# Patient Record
Sex: MALE
[De-identification: names, ages, dates, MRNs, and addresses within clinical notes are randomized per-mention and may not be internally consistent; named-entity substitution may affect disease eponyms.]

---

## 2017-03-19 ENCOUNTER — HOSPITAL ENCOUNTER (EMERGENCY)
Dept: HOSPITAL 14 - H.ER | Age: 62
Discharge: HOME | End: 2017-03-19
Payer: MEDICARE

## 2017-03-19 VITALS
DIASTOLIC BLOOD PRESSURE: 72 MMHG | TEMPERATURE: 98.2 F | OXYGEN SATURATION: 99 % | HEART RATE: 94 BPM | SYSTOLIC BLOOD PRESSURE: 116 MMHG | RESPIRATION RATE: 16 BRPM

## 2017-03-19 VITALS — BODY MASS INDEX: 19.5 KG/M2

## 2017-03-19 DIAGNOSIS — I10: ICD-10-CM

## 2017-03-19 DIAGNOSIS — K29.70: Primary | ICD-10-CM

## 2017-03-19 LAB
ALBUMIN/GLOB SERPL: 1.4 {RATIO} (ref 1–2.1)
ALP SERPL-CCNC: 85 U/L (ref 38–126)
ALT SERPL-CCNC: 32 U/L (ref 21–72)
AST SERPL-CCNC: 20 U/L (ref 17–59)
BASOPHILS # BLD AUTO: 0 K/UL (ref 0–0.2)
BASOPHILS NFR BLD: 0.5 % (ref 0–2)
BILIRUB SERPL-MCNC: 0.3 MG/DL (ref 0.2–1.3)
BUN SERPL-MCNC: 21 MG/DL (ref 9–20)
CALCIUM SERPL-MCNC: 9.3 MG/DL (ref 8.4–10.2)
CHLORIDE SERPL-SCNC: 102 MMOL/L (ref 98–107)
CO2 SERPL-SCNC: 24 MMOL/L (ref 22–30)
EOSINOPHIL # BLD AUTO: 0.1 K/UL (ref 0–0.7)
EOSINOPHIL NFR BLD: 0.7 % (ref 0–4)
ERYTHROCYTE [DISTWIDTH] IN BLOOD BY AUTOMATED COUNT: 14.6 % (ref 11.5–14.5)
ETHANOL SERPL-MCNC: < 10 MG/DL (ref 0–10)
GLOBULIN SER-MCNC: 3 GM/DL (ref 2.2–3.9)
GLUCOSE SERPL-MCNC: 101 MG/DL (ref 75–110)
HCT VFR BLD CALC: 38.7 % (ref 35–51)
LYMPHOCYTES # BLD AUTO: 2 K/UL (ref 1–4.3)
LYMPHOCYTES NFR BLD AUTO: 21.7 % (ref 20–40)
MCH RBC QN AUTO: 32 PG (ref 27–31)
MCHC RBC AUTO-ENTMCNC: 34.4 G/DL (ref 33–37)
MCV RBC AUTO: 93 FL (ref 80–94)
MONOCYTES # BLD: 0.7 K/UL (ref 0–0.8)
MONOCYTES NFR BLD: 7.5 % (ref 0–10)
NEUTROPHILS # BLD: 6.5 K/UL (ref 1.8–7)
NEUTROPHILS NFR BLD AUTO: 69.6 % (ref 50–75)
NRBC BLD AUTO-RTO: 0 % (ref 0–0)
PLATELET # BLD: 222 K/UL (ref 130–400)
PMV BLD AUTO: 8.1 FL (ref 7.2–11.7)
POTASSIUM SERPL-SCNC: 4.2 MMOL/L (ref 3.6–5)
PROT SERPL-MCNC: 7.4 G/DL (ref 6.3–8.2)
SODIUM SERPL-SCNC: 142 MMOL/L (ref 132–148)
WBC # BLD AUTO: 9.3 K/UL (ref 4.8–10.8)

## 2017-03-19 PROCEDURE — 83690 ASSAY OF LIPASE: CPT

## 2017-03-19 PROCEDURE — 99281 EMR DPT VST MAYX REQ PHY/QHP: CPT

## 2017-03-19 PROCEDURE — 96374 THER/PROPH/DIAG INJ IV PUSH: CPT

## 2017-03-19 PROCEDURE — 85025 COMPLETE CBC W/AUTO DIFF WBC: CPT

## 2017-03-19 PROCEDURE — 74176 CT ABD & PELVIS W/O CONTRAST: CPT

## 2017-03-19 PROCEDURE — 80053 COMPREHEN METABOLIC PANEL: CPT

## 2017-03-19 PROCEDURE — 96375 TX/PRO/DX INJ NEW DRUG ADDON: CPT

## 2017-03-19 NOTE — CT
EXAM:

  CT Abdomen and Pelvis Without Intravenous Contrast.



CLINICAL HISTORY:

  61 years old, male; Pain; Abdominal pain; Generalized; Patient HX: B/l abd 

pain with epigastric discomfort/anxiety. HTN. Pacreatitis; Additional info: R/O 

kidney stone



TECHNIQUE:

  Axial computed tomography images of the abdomen and pelvis without 

intravenous contrast.  This CT exam was performed using one or more of the 

following dose reduction techniques:  automated exposure control, adjustment of 

the mA and/or kV according to patient size, and/or use of iterative 

reconstruction technique.



COMPARISON:

  CT - ABD   PELVIS IV CONTRAST ONLY 1/14/2017 2:44:30 AM



FINDINGS:

  Lower thorax:  There is minimal bibasilar atelectasis.



 ABDOMEN:

  Liver:  There are no focal liver lesions present.

  Gallbladder and bile ducts:  The gallbladder is normal.  No calcified stones. 

 No ductal dilation.

  Pancreas:  The pancreas is normal.  No ductal dilation.

  Spleen:  The spleen is normal.

  Adrenals:  2.3 CM left adrenal adenoma is slightly enlarged from 2.0 CM on 

1/14/2017.

  Kidneys and ureters:  There is mild fullness of the left renal collecting 

system and left ureter without an obstructing calculus seen.  This could 

possibly relate to recently passed calculus, please correlate clinically.  The 

right kidney is normal.

  Stomach and bowel:  Stomach is partially decompressed and grossly 

unremarkable.  Colonic constipation is present.  There is no evidence of 

intestinal obstruction.  No mucosal thickening.

  Appendix:  No findings to suggest acute appendicitis.



 PELVIS:

  Bladder:  Bladder is partially decompressed however appears thickwalled.  The 

wall thickening could relate to underdistention, chronic outflow obstruction or 

infectious, inflammatory or neoplastic process.  Please correlate clinically 

and if indicated further evaluation can be obtained.

  Reproductive:  Prostate is prominent measuring 5.0 CM transverse.



 ABDOMEN and PELVIS:

  Intraperitoneal space:  There is no evidence of free intraperitoneal fluid.  

There is no free intraperitoneal air.

  Bones/joints:  There are mild degenerative changes present.  No acute 

fracture.  No dislocation.

  Soft tissues:  Unremarkable.

  Vasculature:  The aorta demonstrates moderate atherosclerotic calcification.  

No abdominal aortic aneurysm.

  Lymph nodes:  There is no evidence of lymphadenopathy.



IMPRESSION:     

1.  There is mild fullness of the left renal collecting system and left ureter 

without an obstructing calculus seen.  This could possibly relate to recently 

passed calculus, please correlate clinically.  

2.  Bladder is partially decompressed however appears thickwalled.  The wall 

thickening could relate to underdistention, chronic outflow obstruction or 

infectious, inflammatory or neoplastic process.  Please correlate clinically 

and if indicated further evaluation can be obtained.  

3.  Additional incidental and/or chronic findings as described.

## 2017-03-19 NOTE — ED PDOC
HPI: Abdomen


Time Seen by Provider: 17 20:49


Chief Complaint (Nursing): Chest Pain


Chief Complaint (Provider): GI problem


History Per: Patient


History/Exam Limitations: no limitations


Onset/Duration Of Symptoms: Days (2x)


Current Symptoms Are (Timing): Still Present


Severity: Moderate


Location Of Pain/Discomfort: Epigastric


Associated Symptoms: denies: Fever, Diarrhea


Additional Complaint(s): 


61 year old male patient with a pertinent medical history of pancreatitis and 

EtOH abuse presents to the ED with complaints of abdominal pain and vomiting 

that started 2x days ago. He denies ingesting alcohol for the past 8x months. 

He denies having a fever and diarrhea. 





PMD: Patient does not recall





Past Medical History


Reviewed: Historical Data, Nursing Documentation, Vital Signs


Vital Signs: 


 Last Vital Signs











Temp  98.2 F   17 20:29


 


Pulse  94 H  17 20:29


 


Resp  16   17 20:29


 


BP  116/72   17 20:29


 


Pulse Ox  99   17 21:45














- Medical History


PMH: Anxiety, Depression, HTN, Pancreatitis (Recurrent)


   Denies: HIV, Chronic Kidney Disease





- Family History


Family History: States: Unknown Family Hx





- Social History


Alcohol: None


Drugs: Denies





- Home Medications


Home Medications: 


 Ambulatory Orders











 Medication  Instructions  Recorded


 


Alprazolam [Xanax] 0.5 mg PO TID 16


 


Paroxetine HCl [Paxil] 40 mg PO DAILY 16


 


oxyCODONE [oxyCODONE Immediate 5 mg PO Q6 PRN 17





Release Tab]  


 


Dicyclomine [Dicyclomine HCl] 10 mg PO Q8 #20 cap 17


 


Famotidine [Pepcid] 20 mg PO Q12 #20 tab 17














- Allergies


Allergies/Adverse Reactions: 


 Allergies











Allergy/AdvReac Type Severity Reaction Status Date / Time


 


No Known Allergies Allergy   Verified 17 20:28














Review of Systems


ROS Statement: Except As Marked, All Systems Reviewed And Found Negative


Constitutional: Negative for: Fever


Gastrointestinal: Positive for: Vomiting, Abdominal Pain.  Negative for: 

Diarrhea





Physical Exam





- Reviewed


Nursing Documentation Reviewed: Yes


Vital Signs Reviewed: Yes





- Physical Exam


Appears: Positive for: Well, Non-toxic, No Acute Distress


Head Exam: Positive for: ATRAUMATIC, NORMOCEPHALIC


Skin: Positive for: Normal Color, Warm, Dry


Cardiovascular/Chest: Positive for: Regular Rate, Rhythm, Chest Non Tender


Respiratory: Positive for: Normal Breath Sounds.  Negative for: Respiratory 

Distress


Gastrointestinal/Abdominal: Positive for: Bowel Sounds, Tenderness (epigastric)


Neurologic/Psych: Positive for: Alert, Oriented (3x)





- Laboratory Results


Result Diagrams: 


 17 21:41





 17 21:41





- ECG


O2 Sat by Pulse Oximetry: 99 (RA)


Pulse Ox Interpretation: Normal





Medical Decision Making


Medical Decision Makin:49


Initial impression: 61 year old male with abdominal pain and vomiting.


Initial plan:


* alcohol serum


* CMP


* lipase


* CBC


* lactated ringers 1,000ml IV 500mls/hr


* morphine 4mg IVP


* pepcid 4mg IVP


* zofran 4mg IVP


* reevaluation


 


--------------------------------------------------------------------------------

----------------- 


Scribe Attestation:


Documented by Robyn Cancino, acting as a scribe for Mamadou Schaeffer MD.


 


Provider Scribe Attestation:


All medical record entries made by the Scribe were at my direction and 

personally dictated by me. I have reviewed the chart and agree that the record 

accurately reflects my personal performance of the history, physical exam, 

medical decision making, and the department course for this patient. I have 

also personally directed, reviewed, and agree with the discharge instructions 

and disposition.


 





Disposition





- Clinical Impression


Clinical Impression: 


 Gastritis





- Patient ED Disposition


Is Patient to be Admitted: No


Counseled Patient/Family Regarding: Studies Performed, Diagnosis, Need For 

Followup, Rx Given





- Disposition


Referrals: 


[facility] [Outside]


Disposition: Routine/Home


Disposition Time: 23:38


Condition: FAIR


Prescriptions: 


Dicyclomine [Dicyclomine HCl] 10 mg PO Q8 #20 cap


Famotidine [Pepcid] 20 mg PO Q12 #20 tab


Instructions:  Gastritis (ED)

## 2017-04-10 ENCOUNTER — HOSPITAL ENCOUNTER (INPATIENT)
Dept: HOSPITAL 14 - H.ER | Age: 62
LOS: 2 days | Discharge: HOME | DRG: 439 | End: 2017-04-12
Attending: INTERNAL MEDICINE | Admitting: INTERNAL MEDICINE
Payer: MEDICARE

## 2017-04-10 VITALS — BODY MASS INDEX: 19.5 KG/M2

## 2017-04-10 DIAGNOSIS — F11.20: ICD-10-CM

## 2017-04-10 DIAGNOSIS — Z91.19: ICD-10-CM

## 2017-04-10 DIAGNOSIS — F32.9: ICD-10-CM

## 2017-04-10 DIAGNOSIS — F10.10: ICD-10-CM

## 2017-04-10 DIAGNOSIS — K86.1: ICD-10-CM

## 2017-04-10 DIAGNOSIS — F17.200: ICD-10-CM

## 2017-04-10 DIAGNOSIS — K85.90: Primary | ICD-10-CM

## 2017-04-10 DIAGNOSIS — I10: ICD-10-CM

## 2017-04-10 DIAGNOSIS — F41.8: ICD-10-CM

## 2017-04-10 DIAGNOSIS — G89.29: ICD-10-CM

## 2017-04-10 LAB
ALBUMIN/GLOB SERPL: 1.4 {RATIO} (ref 1–2.1)
ALP SERPL-CCNC: 69 U/L (ref 38–126)
ALT SERPL-CCNC: 18 U/L (ref 21–72)
APTT BLD: 24 SECONDS (ref 23.3–32.5)
AST SERPL-CCNC: 26 U/L (ref 17–59)
BASOPHILS # BLD AUTO: 0 K/UL (ref 0–0.2)
BASOPHILS NFR BLD: 0.7 % (ref 0–2)
BILIRUB SERPL-MCNC: 0.3 MG/DL (ref 0.2–1.3)
BILIRUB UR-MCNC: NEGATIVE MG/DL
BUN SERPL-MCNC: 19 MG/DL (ref 9–20)
CALCIUM SERPL-MCNC: 10.1 MG/DL (ref 8.4–10.2)
CHLORIDE SERPL-SCNC: 104 MMOL/L (ref 98–107)
CO2 SERPL-SCNC: 26 MMOL/L (ref 22–30)
COLOR UR: (no result)
EOSINOPHIL # BLD AUTO: 0 K/UL (ref 0–0.7)
EOSINOPHIL NFR BLD: 0.3 % (ref 0–4)
ERYTHROCYTE [DISTWIDTH] IN BLOOD BY AUTOMATED COUNT: 14 % (ref 11.5–14.5)
ETHANOL SERPL-MCNC: < 10 MG/DL (ref 0–10)
GLOBULIN SER-MCNC: 3.2 GM/DL (ref 2.2–3.9)
GLUCOSE SERPL-MCNC: 88 MG/DL (ref 75–110)
GLUCOSE UR STRIP-MCNC: (no result) MG/DL
HCT VFR BLD CALC: 39.6 % (ref 35–51)
KETONES UR STRIP-MCNC: NEGATIVE MG/DL
LEUKOCYTE ESTERASE UR-ACNC: (no result) LEU/UL
LIPASE SERPL-CCNC: 429 U/L (ref 23–300)
LYMPHOCYTES # BLD AUTO: 1.7 K/UL (ref 1–4.3)
LYMPHOCYTES NFR BLD AUTO: 27.4 % (ref 20–40)
MCH RBC QN AUTO: 31.6 PG (ref 27–31)
MCHC RBC AUTO-ENTMCNC: 33.4 G/DL (ref 33–37)
MCV RBC AUTO: 94.6 FL (ref 80–94)
MONOCYTES # BLD: 0.6 K/UL (ref 0–0.8)
MONOCYTES NFR BLD: 8.8 % (ref 0–10)
NEUTROPHILS # BLD: 4 K/UL (ref 1.8–7)
NEUTROPHILS NFR BLD AUTO: 62.8 % (ref 50–75)
NRBC BLD AUTO-RTO: 0 % (ref 0–0)
PH UR STRIP: 6 [PH] (ref 5–8)
PLATELET # BLD: 225 K/UL (ref 130–400)
PMV BLD AUTO: 8 FL (ref 7.2–11.7)
POTASSIUM SERPL-SCNC: 4.7 MMOL/L (ref 3.6–5)
PROT SERPL-MCNC: 7.6 G/DL (ref 6.3–8.2)
PROT UR STRIP-MCNC: NEGATIVE MG/DL
RBC # UR STRIP: NEGATIVE /UL
RBC #/AREA URNS HPF: 1 /HPF (ref 0–3)
SODIUM SERPL-SCNC: 144 MMOL/L (ref 132–148)
SP GR UR STRIP: 1 (ref 1–1.03)
UROBILINOGEN UR-MCNC: (no result) MG/DL (ref 0.2–1)
WBC # BLD AUTO: 6.4 K/UL (ref 4.8–10.8)
WBC #/AREA URNS HPF: 2 /HPF (ref 0–5)

## 2017-04-10 NOTE — ED PDOC
- Laboratory Results


Result Diagrams: 


 04/10/17 19:10





 04/10/17 19:10





- ECG


O2 Sat by Pulse Oximetry: 98 (RA)





Medical Decision Making


Medical Decision Makin:00





Patient transferred over to me by Dr. Rodriguez Olson III pending ED workup. 





19:16


Patient is to be placed in ED Obs secondary to ED workup. 





0006: Patient still experiencing intractable pain. Spoke with Dr. Hahn who 

will admit the patient. Spoke with Dr. Cedillo who states patient was supposed 

to f/u outpatient, but never made an appointment. Dr. Cedillo agrees to consult 

and will consider ERCP or MRCP tomorrow. 





Disposition





- Clinical Impression


Clinical Impression: 


 Abdominal pain, Acute on chronic pancreatitis





- POA


Present On Arrival: None





- Disposition


Disposition: Hospitalized as Observation Patient


Disposition Time: 19:16


Condition: STABLE





ED OBSERVATION


Date of observation admission: 04/10/17


Time of observation admission: 19:16





- Progress Note


Progress Note: 


19:16





Patient placed in ED Obs secondary to ED workup.

## 2017-04-10 NOTE — CT
EXAM:

  CT Abdomen and Pelvis With Intravenous Contrast



CLINICAL HISTORY:

  61 years old, male; Pain; Abdominal pain; Epigastric; Patient HX: HX of 

chronic pancreatitis, gastritis; Additional info: Abdominal pain; HX 

pancreatitis abnormal pancreas



TECHNIQUE:

  Axial computed tomography images of the abdomen and pelvis with intravenous 

contrast.  This CT exam was performed using one or more of the following dose 

reduction techniques:  automated exposure control, adjustment of the mA and/or 

kV according to patient size, and/or use of iterative reconstruction technique.

  Coronal and sagittal reformatted images were created and reviewed.



CONTRAST:

  90 mL of [id number] administered intravenously.



COMPARISON:

  CT - ABD   PELVIS W/O PO OR 3/19/2017 10:51:39 PM



FINDINGS:

  Lower thorax:  There is minimal bibasilar atelectasis.



 ABDOMEN:

  Liver:  There are no focal liver lesions present.

  Gallbladder and bile ducts:  The gallbladder is normal.  No calcified stones. 

 No ductal dilation.

  Pancreas:  The pancreatic duct is prominent centrally measuring 5 mm.  It is 

difficult to exclude underlying abnormality.  If indicated, this could be 

further evaluated with MRCP or ERCP.

  Spleen:  The spleen is normal.

  Adrenals:  1.5 CM right adrenal adenoma.  2.0 CM left adrenal adenoma.

  Kidneys and ureters:  The kidneys are normal.  No hydronephrosis.

  Stomach and bowel:  The stomach is decompressed.  Colonic constipation is 

present.  There is no evidence of intestinal obstruction.  No mucosal 

thickening.

  Appendix:  No findings to suggest acute appendicitis.



 PELVIS:

  Bladder:  The bladder is normal.

  Reproductive:  4.8 CM prostate.



 ABDOMEN and PELVIS:

  Intraperitoneal space:  There is no evidence of free intraperitoneal fluid.  

There is no free intraperitoneal air.

  Bones/joints:  There are mild degenerative changes present.  No acute 

fracture.  No dislocation.

  Soft tissues:  Unremarkable.

  Vasculature:  The aorta demonstrates moderate atherosclerotic calcification.  

No abdominal aortic aneurysm.

  Lymph nodes:  There are multiple nonspecific enlarged lymph nodes.



IMPRESSION:     

1.  The pancreatic duct is prominent centrally measuring 5 mm.  It is difficult 

to exclude underlying abnormality.  If indicated, this could be further 

evaluated with MRCP or ERCP.  

2.  1.5 CM right adrenal adenoma.  2.0 CM left adrenal adenoma.  

3.  Additional incidental and/or chronic findings as described.

## 2017-04-10 NOTE — ED PDOC
HPI: Abdomen


Time Seen by Provider: 04/10/17 18:32


Chief Complaint (Nursing): Abdominal Pain


Chief Complaint (Provider): Abdominal Pain


History Per: Patient


History/Exam Limitations: no limitations


Onset/Duration Of Symptoms: Days (x2)


Associated Symptoms: Nausea, Vomiting (nonbloody)


Additional Complaint(s): 


18:39





Buddy Glynn is a 61 year old male with a history of chronic pancreatitis, 

drug and alcohol abuse, and chronic smoking, that presents to the ED with a 

chief complaint of stabbing central abdominal pain that radiates to his back 

that he has been experiencing for the past two days with associated nausea and 

several episodes on nonbloody vomiting. Patient states that this episode feels 

similar to the episodes of abdominal pain he has experienced as a result of 

pancreatitis, which he developed due to his drug and alcohol abuse, but also 

states that he has been sober from both drugs and alcohol for the past 7 

months. He denies any fever, syncope, chest pain, or shortness of breath. 





PMD: Kennedy Hahn





Past Medical History


Reviewed: Historical Data, Nursing Documentation, Vital Signs


Vital Signs: 


 Last Vital Signs











Temp  98.4 F   04/10/17 18:23


 


Pulse  103 H  04/10/17 18:23


 


Resp  16   04/10/17 18:23


 


BP  156/76 H  04/10/17 18:23


 


Pulse Ox  98   04/10/17 19:09














- Medical History


PMH: Anxiety, Depression, HTN, Pancreatitis (Recurrent)


   Denies: HIV, Chronic Kidney Disease





- Surgical History


Surgical History: No Surg Hx





- Family History


Family History: States: Unknown Family Hx





- Social History


Current smoker - smoking cessation education provided: Yes


Alcohol: None (patient states that he has been sober for 7 months)


Drugs: Denies, Other (patient states that he has been sober for 7 months)





- Home Medications


Home Medications: 


 Ambulatory Orders











 Medication  Instructions  Recorded


 


Alprazolam [Xanax] 0.5 mg PO TID 16


 


Paroxetine HCl [Paxil] 40 mg PO DAILY 16


 


oxyCODONE [oxyCODONE Immediate 5 mg PO Q6 PRN 17





Release Tab]  


 


Dicyclomine [Dicyclomine HCl] 10 mg PO Q8 #20 cap 17


 


Famotidine [Pepcid] 20 mg PO Q12 #20 tab 17














- Allergies


Allergies/Adverse Reactions: 


 Allergies











Allergy/AdvReac Type Severity Reaction Status Date / Time


 


No Known Allergies Allergy   Verified 17 20:28














Review of Systems


Constitutional: Negative for: Fever


Cardiovascular: Negative for: Chest Pain


Respiratory: Negative for: Shortness of Breath


Gastrointestinal: Positive for: Nausea, Vomiting (nonbloody)


Neurological: Negative for: Other (Syncope)





Physical Exam





- Reviewed


Nursing Documentation Reviewed: Yes


Vital Signs Reviewed: Yes





- Physical Exam


Appears: Positive for: Non-toxic


Head Exam: Positive for: ATRAUMATIC, NORMOCEPHALIC


Skin: Positive for: Normal Color, Warm.  Negative for: Pallor


Cardiovascular/Chest: Positive for: Regular Rate, Rhythm.  Negative for: Murmur


Respiratory: Positive for: Normal Breath Sounds.  Negative for: Wheezing


Pulses-Dorsalis Pedis (L): 2+


Pulses-Dorsalis Pedis (R): 2+


Gastrointestinal/Abdominal: Positive for: Tenderness (mild central abdominal 

tenderness).  Negative for: Guarding, Rebound


Neurologic/Psych: Positive for: Alert, Oriented





- ECG


O2 Sat by Pulse Oximetry: 98 (RA)


Pulse Ox Interpretation: Normal





Medical Decision Making


Medical Decision Makin:37





Initial Impression: Acute Abdominal Pain





Initial Plan:


* CBC


* CMP


* PTT


* PT


* Lipase


* Alcohol Serum


* Urinalysis


* Morphine 2 mg IV


* Sodium Chloride 1000 mL at 1000 mLs/hr


* Iohexol 50 ml PO


* Reevaluation





Patient's old charts were reviewed, CT Scan from 2017 revealed abnormal 

appearing tail of pancreas. Obtaining image to gauge stability of evolution. 





19:00


Patient will be transferred over to Dr. Ilia Contreras pending ED workup.


--------------------------------------------------------------------------------

----------------- 


Scribe Attestation:


Documented by Sherri Iglesias, acting as a scribe for Rodriguez Olson III, MD.





Provider Scribe Attestation:


All medical record entries made by the Scribe were at my direction and 

personally dictated by me. I have reviewed the chart and agree that the record 

accurately reflects my personal performance of the history, physical exam, 

medical decision making, and the department course for this patient. I have 

also personally directed, reviewed, and agree with the discharge instructions 

and disposition.





Disposition





- Clinical Impression


Clinical Impression: 


 Abdominal pain





- Patient ED Disposition


Is Patient to be Admitted: Transfer of Care





- Disposition


Disposition: Transfer of Care


Disposition Time: 19:10


Condition: STABLE


Patient Signed Over To: Ilia Contreras


Handoff Comments: pending labs imaging dispo

## 2017-04-11 LAB
ALBUMIN/GLOB SERPL: 1.4 {RATIO} (ref 1–2.1)
ALP SERPL-CCNC: 69 U/L (ref 38–126)
ALT SERPL-CCNC: 20 U/L (ref 21–72)
APTT BLD: 25.4 SECONDS (ref 23.3–32.5)
AST SERPL-CCNC: 26 U/L (ref 17–59)
BILIRUB SERPL-MCNC: 0.5 MG/DL (ref 0.2–1.3)
BILIRUB UR-MCNC: NEGATIVE MG/DL
BUN SERPL-MCNC: 17 MG/DL (ref 9–20)
CALCIUM SERPL-MCNC: 9.2 MG/DL (ref 8.4–10.2)
CHLORIDE SERPL-SCNC: 108 MMOL/L (ref 98–107)
CHOLEST SERPL-MCNC: 142 MG/DL (ref 0–199)
CO2 SERPL-SCNC: 24 MMOL/L (ref 22–30)
COLOR UR: YELLOW
ERYTHROCYTE [DISTWIDTH] IN BLOOD BY AUTOMATED COUNT: 13.8 % (ref 11.5–14.5)
GLOBULIN SER-MCNC: 2.6 GM/DL (ref 2.2–3.9)
GLUCOSE SERPL-MCNC: 78 MG/DL (ref 75–110)
GLUCOSE UR STRIP-MCNC: (no result) MG/DL
HCT VFR BLD CALC: 34.8 % (ref 35–51)
KETONES UR STRIP-MCNC: NEGATIVE MG/DL
LEUKOCYTE ESTERASE UR-ACNC: (no result) LEU/UL
MCH RBC QN AUTO: 31.8 PG (ref 27–31)
MCHC RBC AUTO-ENTMCNC: 33.8 G/DL (ref 33–37)
MCV RBC AUTO: 94 FL (ref 80–94)
PH UR STRIP: 5 [PH] (ref 5–8)
PLATELET # BLD: 181 K/UL (ref 130–400)
POTASSIUM SERPL-SCNC: 4.4 MMOL/L (ref 3.6–5)
PROT SERPL-MCNC: 6.2 G/DL (ref 6.3–8.2)
PROT UR STRIP-MCNC: NEGATIVE MG/DL
RBC # UR STRIP: NEGATIVE /UL
SODIUM SERPL-SCNC: 143 MMOL/L (ref 132–148)
SP GR UR STRIP: 1.02 (ref 1–1.03)
T4 SERPL-MCNC: 7.36 UG/DL (ref 5.5–11)
TSH SERPL-ACNC: 2.07 MIU/ML (ref 0.46–4.68)
UROBILINOGEN UR-MCNC: (no result) MG/DL (ref 0.2–1)
WBC # BLD AUTO: 6.2 K/UL (ref 4.8–10.8)
WBC #/AREA URNS HPF: < 1 /HPF (ref 0–5)

## 2017-04-11 NOTE — CP.PCM.HP
History of Present Illness





- History of Present Illness


History of Present Illness: 


CC:  Abdominal pain.





60 y/o M, came to ER [facility] for evaluation of Abdominal pain, ondet 2 days PTA 

with no relief.





Pt appear c/o of Abdominal pain RUQ,RLQ increased on DOA, pain is stabbing, 

constant, moderate to severe intensity 7-8:10, radiated to mid-back


associated to N/V/D (several episodes of vomiting non bloody-non bilious).  

Worsening symptoms:  Hx of Alcohol abuse (currently drinking).





Aggravated factor:  Changing positions, Irritability due to pain.





Pt stated, last time he felt these symptoms was having an episode of Acute 

Pancreatitis. 





Pt denied:  Fever, chills, dizziness, syncope, CP, SOB, cough, weakness, sick 

contact, recent travel. Pt denied Hx HIV.





PMHx:  Recurrent Pancreatitis, Anxiety, Depression, HTN.  Hx ETHO abuse, Opioid 

dependence for chronic pain.





CT Abd/Pel Shows; Prominent Pancreatic duct,  b/l adrenal adenoma.














Present on Admission





- Present on Admission


Any Indicators Present on Admission: No





Review of Systems





- Constitutional


Constitutional: Other (negative).  absent: Chills, Fever





- EENT


Eyes: Requires Corrective Lenses


Ears: Other (negative)


Nose/Mouth/Throat: Other (negative)





- Cardiovascular


Cardiovascular: Other (negative)





- Respiratory


Respiratory: Other (negative)





- Gastrointestinal


Gastrointestinal: Abdominal Pain, Diarrhea, Nausea, Vomiting





- Genitourinary


Genitourinary: Other (negative)





- Musculoskeletal


Musculoskeletal: Back Pain





- Integumentary


Integumentary: Other (negative)





- Neurological


Neurological: Other (negative)





- Psychiatric


Psychiatric: Anxiety, Depression





- Endocrine


Endocrine: Other (negative)





- Hematologic/Lymphatic


Hematologic: Other (negative)





Past Patient History





- Infectious Disease


Hx of Infectious Diseases: None





- Past Medical History & Family History


Past Medical History?: Yes


Pertinent Family History: 


Unknown





- Past Social History


Smoking Status: Light Smoker < 10 Cigarettes Daily


Alcohol: Other (ETHO abuse, last time drinking 7 month ago.)


Drugs: Denies


Home Situation {Lives}: Alone





- CARDIAC


Hx Cardiac Disorders: Yes


Hx Hypertension: Yes





- PULMONARY


Hx Respiratory Disorders: No





- NEUROLOGICAL


Hx Neurological Disorder: No





- HEENT


Hx HEENT Problems: No





- RENAL


Hx Chronic Kidney Disease: No





- ENDOCRINE/METABOLIC


Hx Endocrine Disorders: No





- HEMATOLOGICAL/ONCOLOGICAL


Hx Blood Disorders: No


Hx Human Immunodeficiency Virus (HIV): No





- INTEGUMENTARY


Hx Dermatological Problems: No





- MUSCULOSKELETAL/RHEUMATOLOGICAL


Hx Musculoskeletal Disorders: Yes


Hx Falls: Yes





- GASTROINTESTINAL


Hx Gastrointestinal Disorders: Yes


Hx Pancreatitis: Yes (Recurrent)





- GENITOURINARY/GYNECOLOGICAL


Hx Genitourinary Disorders: No





- PSYCHIATRIC


Hx Psychophysiologic Disorder: Yes


Hx Anxiety: Yes


Hx Depression: Yes


Hx Substance Use: Yes





- SURGICAL HISTORY


Hx Surgeries: No





- ANESTHESIA


Hx Anesthesia: No


Hx Anesthesia Reactions: No


Hx Malignant Hyperthermia: No





Meds


Allergies/Adverse Reactions: 


 Allergies











Allergy/AdvReac Type Severity Reaction Status Date / Time


 


No Known Allergies Allergy   Verified 03/19/17 20:28














Physical Exam





- Constitutional


Appears: No Acute Distress





- Head Exam


Head Exam: NORMAL INSPECTION





- Eye Exam


Eye Exam: PERRL





- ENT Exam


ENT Exam: Normal Oropharynx





- Neck Exam


Neck exam: Positive for: Normal Inspection





- Respiratory Exam


Respiratory Exam: NORMAL BREATHING PATTERN





- Cardiovascular Exam


Cardiovascular Exam: REGULAR RHYTHM





- GI/Abdominal Exam


GI & Abdominal Exam: Normal Bowel Sounds, Soft





- Extremities Exam


Extremities exam: Positive for: normal inspection





- Back Exam


Back exam: NORMAL INSPECTION





- Neurological Exam


Neurological exam: Alert, Oriented x3


Additional comments: 


No motor sensor deficit.





- Psychiatric Exam


Psychiatric exam: Anxious





- Skin


Skin Exam: Warm





Results





- Vital Signs


Recent Vital Signs: 





 Last Vital Signs











Temp  97.8 F   04/11/17 07:52


 


Pulse  71   04/11/17 07:52


 


Resp  20   04/11/17 07:52


 


BP  137/82   04/11/17 07:52


 


Pulse Ox  95   04/11/17 07:52








reviewed ASIF





- Labs


Result Diagrams: 


 04/11/17 06:13





 04/11/17 06:50


Labs: 





 Laboratory Results - last 24 hr











  04/10/17 04/11/17 04/11/17





  19:28 06:13 06:50


 


WBC   6.2 


 


RBC   3.70 L 


 


Hgb   11.7 L 


 


Hct   34.8 L 


 


MCV   94.0 


 


MCH   31.8 H 


 


MCHC   33.8 


 


RDW   13.8 


 


Plt Count   181 


 


PT    11.2


 


INR    1.08


 


APTT    25.4


 


Sodium    143


 


Potassium    4.4


 


Chloride    108 H


 


Carbon Dioxide    24


 


Anion Gap    15


 


BUN    17


 


Creatinine    0.7 L


 


Est GFR ( Amer)    > 60


 


Est GFR (Non-Af Amer)    > 60


 


Random Glucose    78


 


Calcium    9.2


 


Total Bilirubin    0.5


 


AST    26


 


ALT    20 L


 


Alkaline Phosphatase    69


 


Total Protein    6.2 L


 


Albumin    3.6


 


Globulin    2.6


 


Albumin/Globulin Ratio    1.4


 


Triglycerides    68


 


Cholesterol    142


 


LDL Cholesterol Direct    85


 


HDL Cholesterol    42


 


Thyroxine (T4)    7.36


 


TSH 3rd Generation    2.07


 


Urine Color  Straw  


 


Urine Clarity  Clear  


 


Urine pH  6.0  


 


Ur Specific Gravity  1.005  


 


Urine Protein  Negative  


 


Urine Glucose (UA)  Neg  


 


Urine Ketones  Negative  


 


Urine Blood  Negative  


 


Urine Nitrate  Negative  


 


Urine Bilirubin  Negative  


 


Urine Urobilinogen  0.2-1.0  


 


Ur Leukocyte Esterase  Neg  


 


Urine RBC (Auto)  1  


 


Urine Microscopic WBC  2  








reviewed J,P.





- Imaging and Cardiology


  ** CT scan - abdomen


Status: Report reviewed by me (ANNYP.)





  ** CT scan - pelvis


Status: Report reviewed by me (LYDIA.P.)





Assessment & Plan


(1) Abdominal pain


Status: Acute   Priority: High





(2) Acute on chronic pancreatitis


Status: Acute   Priority: High





(3) Anxiety


Status: Chronic   Priority: Medium





(4) Depression


Status: Chronic   Priority: Medium








- Assessment and Plan (Free Text)


Plan: 


Continue Dilaudid, Pepcid, Xanax and rest of Tx. f/u GI consult.





- Date & Time


Date: 04/11/17


Time: 11:00

## 2017-04-11 NOTE — CP.PCM.CON
History of Present Illness





- History of Present Illness


History of Present Illness: 


GI consult requested by Dr Hahn- This is a 61 year old male with history of 

Alcohol Abuse and dependance, Chronic pancreatitis with numerous admissions for 

same complaint and chronic abdominal pain presenting with abdominal pain, nausea

, vomiting, and diarrhea. As per patient last drink last week. He is still 

smoking. Patient admits his symptoms are similar to prior ER visit  CT A/P 

showing pancreatic ductal dilatation in pancreatic head and prior hypodense 

pancreatic body lesion not noted from CT A/P 2/2015. Patient states he possibly 

had a colonoscopy five years ago at outside facility-stating he "was not told 

the results". He is focused on asking for pain medications. He is somewhat 

agitated today. Tolerated diet




















Review of Systems





- Review of Systems


Review of Systems: 


12 point ROS unremarkable except that documented in HPI





Past Patient History





- Infectious Disease


Hx of Infectious Diseases: None





- Past Medical History & Family History


Past Medical History?: Yes





- Past Social History


Smoking Status: Light Smoker < 10 Cigarettes Daily





- CARDIAC


Hx Cardiac Disorders: Yes


Hx Hypertension: Yes





- PULMONARY


Hx Respiratory Disorders: No





- NEUROLOGICAL


Hx Neurological Disorder: No





- HEENT


Hx HEENT Problems: No





- RENAL


Hx Chronic Kidney Disease: No





- ENDOCRINE/METABOLIC


Hx Endocrine Disorders: No





- HEMATOLOGICAL/ONCOLOGICAL


Hx Blood Disorders: No


Hx Human Immunodeficiency Virus (HIV): No





- INTEGUMENTARY


Hx Dermatological Problems: No





- MUSCULOSKELETAL/RHEUMATOLOGICAL


Hx Musculoskeletal Disorders: No


Hx Falls: Yes





- GASTROINTESTINAL


Hx Gastrointestinal Disorders: Yes


Hx Pancreatitis: Yes (Recurrent)





- GENITOURINARY/GYNECOLOGICAL


Hx Genitourinary Disorders: No





- PSYCHIATRIC


Hx Psychophysiologic Disorder: Yes


Hx Anxiety: Yes


Hx Depression: Yes


Hx Substance Use: Yes





- SURGICAL HISTORY


Hx Surgeries: No





- ANESTHESIA


Hx Anesthesia: No


Hx Anesthesia Reactions: No


Hx Malignant Hyperthermia: No





Meds


Allergies/Adverse Reactions: 


 Allergies











Allergy/AdvReac Type Severity Reaction Status Date / Time


 


No Known Allergies Allergy   Verified 03/19/17 20:28














- Medications


Medications: 


 Current Medications





Alprazolam (Xanax)  0.5 mg PO TID [facility]


   Last Admin: 04/11/17 16:02 Dose:  0.5 mg


Dicyclomine HCl (Bentyl)  10 mg PO Q8 [facility]


   Last Admin: 04/11/17 18:36 Dose:  10 mg


Famotidine (Pepcid)  20 mg PO Q12 [facility]


   Last Admin: 04/11/17 09:29 Dose:  20 mg


Hydromorphone HCl (Dilaudid)  2 mg IVP Q4H PRN


   PRN Reason: Pain, severe (8-10)


   Last Admin: 04/11/17 19:51 Dose:  2 mg


Lactated Ringer's (Lactated Ringer's)  1,000 mls @ 250 mls/hr IV .Q4H [facility]


   Last Admin: 04/11/17 19:56 Dose:  250 mls/hr


Oxycodone HCl (Oxycodone Immediate Release Tab)  5 mg PO Q6 PRN


   PRN Reason: Pain, moderate (4-7)


Pantoprazole Sodium (Protonix Inj)  40 mg IVP DAILY [facility]


   Last Admin: 04/11/17 09:29 Dose:  40 mg


Paroxetine HCl (Paxil)  40 mg PO DAILY [facility]


   Last Admin: 04/11/17 12:29 Dose:  40 mg











Physical Exam





- Constitutional


Appears: Non-toxic, No Acute Distress





- Head Exam


Head Exam: ATRAUMATIC, NORMAL INSPECTION, NORMOCEPHALIC





- Eye Exam


Eye Exam: EOMI, Normal appearance, PERRL





- ENT Exam


ENT Exam: Mucous Membranes Moist, Normal Exam





- Respiratory Exam


Respiratory Exam: Clear to Auscultation Bilateral, NORMAL BREATHING PATTERN





- Cardiovascular Exam


Cardiovascular Exam: REGULAR RHYTHM, RRR, +S1, +S2





- GI/Abdominal Exam


GI & Abdominal Exam: Normal Bowel Sounds, Soft.  absent: Tenderness


Additional comments: 


Non tender. No guarding





- Extremities Exam


Extremities exam: Positive for: normal inspection





- Neurological Exam


Neurological exam: Alert, CN II-XII Intact, Normal Gait, Oriented x3, Reflexes 

Normal





- Skin


Skin Exam: Dry, Intact, Normal Color, Warm





Results





- Vital Signs


Recent Vital Signs: 


 Last Vital Signs











Temp  97.4 F L  04/11/17 20:19


 


Pulse  62   04/11/17 20:19


 


Resp  18   04/11/17 20:19


 


BP  115/74   04/11/17 20:19


 


Pulse Ox  97   04/11/17 20:19














- Labs


Result Diagrams: 


 04/11/17 06:13





 04/11/17 06:50


Labs: 


 Laboratory Results - last 24 hr











  04/11/17





  15:20


 


Urine Color  Yellow


 


Urine Clarity  Clear


 


Urine pH  5.0


 


Ur Specific Gravity  1.019


 


Urine Protein  Negative


 


Urine Glucose (UA)  Neg


 


Urine Ketones  Negative


 


Urine Blood  Negative


 


Urine Nitrate  Negative


 


Urine Bilirubin  Negative


 


Urine Urobilinogen  0.2-1.0


 


Ur Leukocyte Esterase  Neg


 


Urine Microscopic WBC  < 1














Assessment & Plan





- Assessment and Plan (Free Text)


Assessment: 


61 year old male with history of Alcohol Abuse, Chronic pancreatitis with 

numerous previous admissions admitted with, and chronic abdominal pain now 

resolving and tolerating diet. CT A/P showing pancreatic ductal dilatation in 

pancreatic head. Last admission he was given outpatient appointment with Dr Lux for outpatient EUS for PD dilatation but he has been non compliant with 

outpatient appointments. Has opioid dependance due to chronic pain. Actively 

drinking alcohol. 











Plan: 


- High volume IVF as tolerated by cardiac status


- Diet as tolerated


- pain management


- Needs outpatient EUS for PD dilatation and chronic pain symptoms


- Pancreatic enzymes with food and snacks


- GI and DVT prophylaxis


- Alcohol cessation


- Supportive care 





- Date & Time


Date: 04/11/17


Time: 20:45

## 2017-04-12 VITALS — HEART RATE: 62 BPM

## 2017-04-12 VITALS
DIASTOLIC BLOOD PRESSURE: 71 MMHG | SYSTOLIC BLOOD PRESSURE: 116 MMHG | TEMPERATURE: 98.4 F | RESPIRATION RATE: 20 BRPM | OXYGEN SATURATION: 95 %

## 2017-04-12 LAB
ALBUMIN/GLOB SERPL: 1.4 {RATIO} (ref 1–2.1)
ALP SERPL-CCNC: 66 U/L (ref 38–126)
ALT SERPL-CCNC: 23 U/L (ref 21–72)
AMYLASE SERPL-CCNC: 187 U/L (ref 30–110)
AST SERPL-CCNC: 28 U/L (ref 17–59)
BILIRUB SERPL-MCNC: 0.3 MG/DL (ref 0.2–1.3)
BUN SERPL-MCNC: 15 MG/DL (ref 9–20)
CALCIUM SERPL-MCNC: 9.4 MG/DL (ref 8.4–10.2)
CHLORIDE SERPL-SCNC: 107 MMOL/L (ref 98–107)
CO2 SERPL-SCNC: 26 MMOL/L (ref 22–30)
ERYTHROCYTE [DISTWIDTH] IN BLOOD BY AUTOMATED COUNT: 14.1 % (ref 11.5–14.5)
GLOBULIN SER-MCNC: 2.6 GM/DL (ref 2.2–3.9)
GLUCOSE SERPL-MCNC: 74 MG/DL (ref 75–110)
HCT VFR BLD CALC: 33.2 % (ref 35–51)
LIPASE SERPL-CCNC: 143 U/L (ref 23–300)
MCH RBC QN AUTO: 32.1 PG (ref 27–31)
MCHC RBC AUTO-ENTMCNC: 33.8 G/DL (ref 33–37)
MCV RBC AUTO: 94.8 FL (ref 80–94)
PLATELET # BLD: 168 K/UL (ref 130–400)
POTASSIUM SERPL-SCNC: 4.2 MMOL/L (ref 3.6–5)
PROT SERPL-MCNC: 6.2 G/DL (ref 6.3–8.2)
SODIUM SERPL-SCNC: 146 MMOL/L (ref 132–148)
WBC # BLD AUTO: 6.6 K/UL (ref 4.8–10.8)

## 2017-04-12 NOTE — CP.PCM.PN
Subjective





- Date & Time of Evaluation


Date of Evaluation: 04/12/17


Time of Evaluation: 09:15





- Subjective


Subjective: 


F/U  Abdominal pain.





Abdominal pain improved, eating well.





Objective





- Vital Signs/Intake and Output


Vital Signs (last 24 hours): 


 











Temp Pulse Resp BP Pulse Ox


 


 98.4 F   62   20   116/71   95 


 


 04/12/17 07:56  04/12/17 08:31  04/12/17 07:56  04/12/17 07:56  04/12/17 07:56











- Labs


Labs: 


 





 04/12/17 05:20 





 04/12/17 05:20 





 











PT  11.2 SECONDS (9.6-11.2)   04/11/17  06:50    


 


INR  1.08  (0.92-1.08)   04/11/17  06:50    


 


APTT  25.4 SECONDS (23.3-32.5)   04/11/17  06:50    














- Constitutional


Appears: No Acute Distress





- Head Exam


Head Exam: NORMAL INSPECTION





- Eye Exam


Eye Exam: PERRL





- ENT Exam


ENT Exam: Normal Oropharynx





- Neck Exam


Neck Exam: Normal Inspection





- Respiratory Exam


Respiratory Exam: NORMAL BREATHING PATTERN





- Cardiovascular Exam


Cardiovascular Exam: REGULAR RHYTHM





- GI/Abdominal Exam


GI & Abdominal Exam: Soft, Normal Bowel Sounds





- Extremities Exam


Extremities Exam: Normal Inspection





- Back Exam


Back Exam: NORMAL INSPECTION





- Neurological Exam


Neurological Exam: Alert, Oriented x3.  absent: Motor Sensory Deficit





- Psychiatric Exam


Psychiatric exam: Anxious





- Skin


Skin Exam: Warm





Assessment and Plan


(1) Abdominal pain


Assessment & Plan: 


Improved.


Status: Acute





(2) Acute on chronic pancreatitis


Status: Acute





(3) Anxiety


Status: Chronic





(4) Depression


Status: Chronic








- Assessment and Plan (Free Text)


Plan: 


Pt improved ans stable to be discharged, see instruction medication sheet, f/u 

PMD.

## 2017-04-12 NOTE — CP.PCM.PN
Subjective





- Date & Time of Evaluation


Date of Evaluation: 04/12/17


Time of Evaluation: 09:58





- Subjective


Subjective: 


RFV: Pancreatitis


S: Reports mild persistent abdominal pain, but is overall improved. Tolerated 

breakfast. Wants to go home.





Objective





- Vital Signs/Intake and Output


Vital Signs (last 24 hours): 


 











Temp Pulse Resp BP Pulse Ox


 


 98.4 F   62   20   116/71   95 


 


 04/12/17 07:56  04/12/17 08:31  04/12/17 07:56  04/12/17 07:56  04/12/17 07:56











- Medications


Medications: 


 Current Medications





Alprazolam (Xanax)  0.5 mg PO TID [facility]


   Last Admin: 04/12/17 08:10 Dose:  0.5 mg


Dicyclomine HCl (Bentyl)  10 mg PO Q8 [facility]


   Last Admin: 04/12/17 08:11 Dose:  10 mg


Famotidine (Pepcid)  20 mg PO Q12 [facility]


   Last Admin: 04/12/17 08:12 Dose:  20 mg


Hydromorphone HCl (Dilaudid)  2 mg IVP Q4H PRN


   PRN Reason: Pain, severe (8-10)


   Last Admin: 04/12/17 08:09 Dose:  2 mg


Lactated Ringer's (Lactated Ringer's)  1,000 mls @ 60 mls/hr IV .[id number] [facility]


   Last Admin: 04/11/17 23:59 Dose:  60 mls/hr


Oxycodone HCl (Oxycodone Immediate Release Tab)  5 mg PO Q6 PRN


   PRN Reason: Pain, moderate (4-7)


Pantoprazole Sodium (Protonix Inj)  40 mg IVP DAILY [facility]


   Last Admin: 04/12/17 08:12 Dose:  40 mg


Paroxetine HCl (Paxil)  40 mg PO DAILY [facility]


   Last Admin: 04/12/17 08:11 Dose:  40 mg











- Labs


Labs: 


 





 04/12/17 05:20 





 04/12/17 05:20 





 











PT  11.2 SECONDS (9.6-11.2)   04/11/17  06:50    


 


INR  1.08  (0.92-1.08)   04/11/17  06:50    


 


APTT  25.4 SECONDS (23.3-32.5)   04/11/17  06:50    














- Constitutional


Appears: No Acute Distress, Chronically Ill





- Head Exam


Head Exam: ATRAUMATIC, NORMOCEPHALIC





- Eye Exam


Eye Exam: Normal appearance.  absent: Scleral icterus





- ENT Exam


ENT Exam: Mucous Membranes Moist, Normal Oropharynx





- Respiratory Exam


Respiratory Exam: Clear to Ausculation Bilateral, NORMAL BREATHING PATTERN.  

absent: Respiratory Distress





- Cardiovascular Exam


Cardiovascular Exam: REGULAR RHYTHM, +S1, +S2





- GI/Abdominal Exam


GI & Abdominal Exam: Soft.  absent: Distended, Guarding, Tenderness





- Neurological Exam


Neurological Exam: Alert, Oriented x3





- Psychiatric Exam


Psychiatric exam: Normal Affect, Normal Mood





- Skin


Skin Exam: Dry, Normal Color, Warm





Assessment and Plan





- Assessment and Plan (Free Text)


Assessment: 


61 year old male with history of EtOH Abuse, Chronic pancreatitis admitted with 

recurrent abdominal pain.


1. Chronic pancreatitis


2. Pancreatic duct dilation


Plan: 


- low fat diet recommended


- pancreatic enzyme supplemenation with meals TID 


- protonix 40 mg po daily


- Diet as tolerated


- etoh/smoking abstinence


- Needs outpatient EUS for PD dilatation to exclude pancreatic mass


- ok ot discharge from GI standpoint

## 2017-04-14 ENCOUNTER — HOSPITAL ENCOUNTER (EMERGENCY)
Dept: HOSPITAL 14 - H.ER | Age: 62
Discharge: HOME | End: 2017-04-14
Payer: MEDICARE

## 2017-04-14 VITALS
OXYGEN SATURATION: 98 % | DIASTOLIC BLOOD PRESSURE: 80 MMHG | HEART RATE: 88 BPM | SYSTOLIC BLOOD PRESSURE: 140 MMHG | RESPIRATION RATE: 16 BRPM | TEMPERATURE: 98.9 F

## 2017-04-14 VITALS — BODY MASS INDEX: 19.5 KG/M2

## 2017-04-14 DIAGNOSIS — F41.9: ICD-10-CM

## 2017-04-14 DIAGNOSIS — K85.90: ICD-10-CM

## 2017-04-14 DIAGNOSIS — F10.20: ICD-10-CM

## 2017-04-14 DIAGNOSIS — G89.29: ICD-10-CM

## 2017-04-14 DIAGNOSIS — Z87.891: ICD-10-CM

## 2017-04-14 DIAGNOSIS — R11.10: ICD-10-CM

## 2017-04-14 DIAGNOSIS — R10.13: Primary | ICD-10-CM

## 2017-04-14 DIAGNOSIS — I10: ICD-10-CM

## 2017-04-14 LAB
ALBUMIN/GLOB SERPL: 1.5 {RATIO} (ref 1–2.1)
ALP SERPL-CCNC: 63 U/L (ref 38–126)
ALT SERPL-CCNC: 29 U/L (ref 21–72)
AST SERPL-CCNC: 23 U/L (ref 17–59)
BASOPHILS # BLD AUTO: 0.1 K/UL (ref 0–0.2)
BASOPHILS NFR BLD: 0.7 % (ref 0–2)
BILIRUB SERPL-MCNC: 0.3 MG/DL (ref 0.2–1.3)
BILIRUB UR-MCNC: NEGATIVE MG/DL
BUN SERPL-MCNC: 21 MG/DL (ref 9–20)
CALCIUM SERPL-MCNC: 9.4 MG/DL (ref 8.4–10.2)
CHLORIDE SERPL-SCNC: 106 MMOL/L (ref 98–107)
CO2 SERPL-SCNC: 24 MMOL/L (ref 22–30)
COLOR UR: (no result)
EOSINOPHIL # BLD AUTO: 0.1 K/UL (ref 0–0.7)
EOSINOPHIL NFR BLD: 0.9 % (ref 0–4)
ERYTHROCYTE [DISTWIDTH] IN BLOOD BY AUTOMATED COUNT: 14 % (ref 11.5–14.5)
ETHANOL SERPL-MCNC: < 10 MG/DL (ref 0–10)
GLOBULIN SER-MCNC: 2.8 GM/DL (ref 2.2–3.9)
GLUCOSE SERPL-MCNC: 101 MG/DL (ref 75–110)
GLUCOSE UR STRIP-MCNC: (no result) MG/DL
HCT VFR BLD CALC: 36.4 % (ref 35–51)
KETONES UR STRIP-MCNC: NEGATIVE MG/DL
LEUKOCYTE ESTERASE UR-ACNC: (no result) LEU/UL
LIPASE SERPL-CCNC: 155 U/L (ref 23–300)
LYMPHOCYTES # BLD AUTO: 1.5 K/UL (ref 1–4.3)
LYMPHOCYTES NFR BLD AUTO: 18.2 % (ref 20–40)
MCH RBC QN AUTO: 32.1 PG (ref 27–31)
MCHC RBC AUTO-ENTMCNC: 34 G/DL (ref 33–37)
MCV RBC AUTO: 94.4 FL (ref 80–94)
MONOCYTES # BLD: 0.7 K/UL (ref 0–0.8)
MONOCYTES NFR BLD: 7.7 % (ref 0–10)
NEUTROPHILS # BLD: 6.2 K/UL (ref 1.8–7)
NEUTROPHILS NFR BLD AUTO: 72.5 % (ref 50–75)
NRBC BLD AUTO-RTO: 0 % (ref 0–0)
PH UR STRIP: 7 [PH] (ref 5–8)
PLATELET # BLD: 188 K/UL (ref 130–400)
PMV BLD AUTO: 7.8 FL (ref 7.2–11.7)
POTASSIUM SERPL-SCNC: 4.1 MMOL/L (ref 3.6–5)
PROT SERPL-MCNC: 7 G/DL (ref 6.3–8.2)
PROT UR STRIP-MCNC: NEGATIVE MG/DL
RBC # UR STRIP: NEGATIVE /UL
RBC #/AREA URNS HPF: < 1 /HPF (ref 0–3)
SODIUM SERPL-SCNC: 141 MMOL/L (ref 132–148)
SP GR UR STRIP: < 1.005 (ref 1–1.03)
UROBILINOGEN UR-MCNC: (no result) MG/DL (ref 0.2–1)
WBC # BLD AUTO: 8.5 K/UL (ref 4.8–10.8)
WBC #/AREA URNS HPF: 1 /HPF (ref 0–5)

## 2017-04-14 PROCEDURE — 99282 EMERGENCY DEPT VISIT SF MDM: CPT

## 2017-04-14 PROCEDURE — 93005 ELECTROCARDIOGRAM TRACING: CPT

## 2017-04-14 PROCEDURE — 96374 THER/PROPH/DIAG INJ IV PUSH: CPT

## 2017-04-14 PROCEDURE — 83690 ASSAY OF LIPASE: CPT

## 2017-04-14 PROCEDURE — 85025 COMPLETE CBC W/AUTO DIFF WBC: CPT

## 2017-04-14 PROCEDURE — 80053 COMPREHEN METABOLIC PANEL: CPT

## 2017-04-14 PROCEDURE — 81003 URINALYSIS AUTO W/O SCOPE: CPT

## 2017-04-14 NOTE — ED PDOC
HPI: Abdomen


Time Seen by Provider: 17 01:25


Chief Complaint (Nursing): Abdominal Pain


Chief Complaint (Provider): Abdominal Pain


History Per: Patient


History/Exam Limitations: no limitations


Onset/Duration Of Symptoms: Days (x1)


Current Symptoms Are (Timing): Still Present


Additional Complaint(s): 


62yo male well known to provider and ED for multiple visits for chronic 

abdominal pain, alcohol abuse, depression, and pancreatitis presents to the ED 

with complaints of acute abdominal pain x 1 day. Patient reports multiple 

episodes of vomiting that is non-bloody and non-bilious. Symptoms consistent 

with previous episodes of pancreatitis. Denies alcohol use or any other medical 

complaints. 











Past Medical History


Reviewed: Historical Data, Nursing Documentation, Vital Signs


Vital Signs: 


 Last Vital Signs











Temp  98.9 F   17 01:23


 


Pulse  88   17 01:23


 


Resp  16   17 01:23


 


BP  140/80   17 01:23


 


Pulse Ox  98   17 02:26














- Medical History


PMH: Anxiety, Depression, HTN, Pancreatitis (Recurrent), Chronic Pain (abdominal

)


   Denies: HIV, Chronic Kidney Disease


Other PMH: alcohol abuse





- Surgical History


Surgical History: No Surg Hx





- Family History


Family History: States: No Known Family Hx





- Social History


Current smoker - smoking cessation education provided: Yes


Ex-Smoker (has not smoked in the last 12 months): No


Alcohol: > 2 Drinks/Day


Drugs: Denies





- Home Medications


Home Medications: 


 Ambulatory Orders











 Medication  Instructions  Recorded


 


Alprazolam [Xanax] 0.5 mg PO TID 16


 


Paroxetine HCl [Paxil] 40 mg PO DAILY 16


 


oxyCODONE [oxyCODONE Immediate 5 mg PO Q6 PRN 17





Release Tab]  


 


Dicyclomine [Bentyl] 10 mg PO Q8 #20 cap 17


 


Amylase/Lipase/Protease [Pancrease 25,000 u PO TIDWM #30 ecc 17





45899 [id number] [id number] U]  


 


Pantoprazole [Protonix EC Tab] 40 mg PO DAILY #30 ect 17


 


Dicyclomine [Bentyl] 20 mg PO Q12 PRN #20 tab 17














- Allergies


Allergies/Adverse Reactions: 


 Allergies











Allergy/AdvReac Type Severity Reaction Status Date / Time


 


No Known Allergies Allergy   Verified 17 01:23














Review of Systems


ROS Statement: Except As Marked, All Systems Reviewed And Found Negative


Gastrointestinal: Positive for: Vomiting, Abdominal Pain





Physical Exam





- Reviewed


Nursing Documentation Reviewed: Yes


Vital Signs Reviewed: Yes





- Physical Exam


Appears: Positive for: No Acute Distress, Uncomfortable


Head Exam: Positive for: ATRAUMATIC


Skin: Positive for: Normal Color, Warm, Dry


Eye Exam: Positive for: Normal appearance, EOMI, PERRL


ENT: Positive for: Normal ENT Inspection


Cardiovascular/Chest: Positive for: Regular Rate, Rhythm.  Negative for: Murmur


Respiratory: Negative for: Respiratory Distress


Gastrointestinal/Abdominal: Positive for: Bowel Sounds, Soft, Tenderness (mild 

epigastric tenderness).  Negative for: Guarding, Rebound


Back: Positive for: Normal Inspection.  Negative for: L CVA Tenderness, R CVA 

Tenderness


Extremity: Positive for: Normal ROM.  Negative for: Deformity


Neurologic/Psych: Positive for: Alert, Oriented.  Negative for: Motor/Sensory 

Deficits





- Laboratory Results


Result Diagrams: 


 17 02:19





 17 02:19





- ECG


O2 Sat by Pulse Oximetry: 98 (RA)


Pulse Ox Interpretation: Normal





Medical Decision Making


Medical Decision Makin


Initial impression: abdominal pain in setting of previous pancreatitis and 

history of alcoholism


Initial plan:


* EKG


* EtOH serum


* Labs


* UDrug screen


* Lipase


* Morphine 6mg IVP


* UA


* Re-eval





0315


Labs reviewed with no significant abnormalities. Patient notes an improvement 

in symptoms. Provider emphasized the need to follow up with patient's GI 

specialist, and patient notes that he has an appointment on . Patient 

has been prescribed Bentyl.


Dx: abdominal pain








Scribe Attestation:


Documented by Renate Camarena acting as a scribe for Lukas Reynolds MD.


   


MD Scribe Attestation:


All medical record entries made by the Scribe were at my direction and 

personally dictated by me. I have reviewed the chart and agree that the record 

accurately reflects my personal performance of the history, physical exam, 

medical decision making, and the department course for this patient. I have 

also personally directed, reviewed, and agree with the discharge instructions 

and disposition.





Disposition





- Clinical Impression


Clinical Impression: 


 Abdominal pain





- Patient ED Disposition


Is Patient to be Admitted: No


Counseled Patient/Family Regarding: Studies Performed, Diagnosis, Need For 

Followup, Rx Given





- Disposition


Referrals: 


Oralia SCOTT,MD Sarika [Medical Doctor] - 


Kennedy Hahn MD [Primary Care Provider] - 


Fazal Lux MD [Medical Doctor] - 


Disposition: Routine/Home


Disposition Time: 03:15


Condition: STABLE


Prescriptions: 


Dicyclomine [Bentyl] 20 mg PO Q12 PRN #20 tab


 PRN Reason: abdominal pain


Instructions:  Abdominal Pain (ED)

## 2017-04-14 NOTE — CARD
--------------- APPROVED REPORT --------------





EKG Measurement

Heart [id number]

[id number]

[id number]

[id number]

[id number]



<Conclusion>

Normal sinus rhythm

Normal ECG

## 2017-05-10 ENCOUNTER — HOSPITAL ENCOUNTER (EMERGENCY)
Dept: HOSPITAL 14 - H.ER | Age: 62
Discharge: HOME | End: 2017-05-10
Payer: MEDICARE

## 2017-05-10 VITALS
RESPIRATION RATE: 17 BRPM | DIASTOLIC BLOOD PRESSURE: 84 MMHG | OXYGEN SATURATION: 98 % | HEART RATE: 80 BPM | TEMPERATURE: 98.1 F | SYSTOLIC BLOOD PRESSURE: 140 MMHG

## 2017-05-10 VITALS — BODY MASS INDEX: 19.5 KG/M2

## 2017-05-10 DIAGNOSIS — F41.9: ICD-10-CM

## 2017-05-10 DIAGNOSIS — F10.20: ICD-10-CM

## 2017-05-10 DIAGNOSIS — K52.9: Primary | ICD-10-CM

## 2017-05-10 DIAGNOSIS — R11.10: ICD-10-CM

## 2017-05-10 DIAGNOSIS — I10: ICD-10-CM

## 2017-05-10 DIAGNOSIS — I45.10: ICD-10-CM

## 2017-05-10 DIAGNOSIS — G89.29: ICD-10-CM

## 2017-05-10 DIAGNOSIS — F32.9: ICD-10-CM

## 2017-05-10 DIAGNOSIS — K85.90: ICD-10-CM

## 2017-05-10 LAB
ALBUMIN/GLOB SERPL: 1.5 [,] (ref 1–2.1)
ALP SERPL-CCNC: 73 [, U/L] (ref 38–126)
ALT SERPL-CCNC: 29 [, U/L] (ref 21–72)
AST SERPL-CCNC: 23 [, U/L] (ref 17–59)
BASOPHILS # BLD AUTO: 0 [, K/UL] (ref 0–0.2)
BASOPHILS NFR BLD: 0.7 [, %] (ref 0–2)
BILIRUB SERPL-MCNC: 0.4 [, MG/DL] (ref 0.2–1.3)
BUN SERPL-MCNC: 21 [, MG/DL] (ref 9–20)
CALCIUM SERPL-MCNC: 9.6 [, MG/DL] (ref 8.4–10.2)
CHLORIDE SERPL-SCNC: 105 [, MMOL/L] (ref 98–107)
CO2 SERPL-SCNC: 22 [, MMOL/L] (ref 22–30)
EOSINOPHIL # BLD AUTO: 0.1 [, K/UL] (ref 0–0.7)
EOSINOPHIL NFR BLD: 2 [, %] (ref 0–4)
ERYTHROCYTE [DISTWIDTH] IN BLOOD BY AUTOMATED COUNT: 13.9 [, %] (ref 11.5–14.5)
GLOBULIN SER-MCNC: 2.9 [, GM/DL] (ref 2.2–3.9)
GLUCOSE SERPL-MCNC: 100 [, MG/DL] (ref 75–110)
HCT VFR BLD CALC: 40.8 [, %] (ref 35–51)
LIPASE SERPL-CCNC: 137 [, U/L] (ref 23–300)
LYMPHOCYTES # BLD AUTO: 1.9 [, K/UL] (ref 1–4.3)
LYMPHOCYTES NFR BLD AUTO: 28.4 [, %] (ref 20–40)
MCH RBC QN AUTO: 31.7 [, PG] (ref 27–31)
MCHC RBC AUTO-ENTMCNC: 33.3 [, G/DL] (ref 33–37)
MCV RBC AUTO: 95.1 [, FL] (ref 80–94)
MONOCYTES # BLD: 0.6 [, K/UL] (ref 0–0.8)
MONOCYTES NFR BLD: 9 [, %] (ref 0–10)
NEUTROPHILS # BLD: 4.1 [, K/UL] (ref 1.8–7)
NEUTROPHILS NFR BLD AUTO: 59.9 [, %] (ref 50–75)
NRBC BLD AUTO-RTO: 0 [, %] (ref 0–0)
PLATELET # BLD: 185 [, K/UL] (ref 130–400)
PMV BLD AUTO: 8.2 [, FL] (ref 7.2–11.7)
POTASSIUM SERPL-SCNC: 4.3 [, MMOL/L] (ref 3.6–5)
PROT SERPL-MCNC: 7.2 [, G/DL] (ref 6.3–8.2)
SODIUM SERPL-SCNC: 138 [, MMOL/L] (ref 132–148)
WBC # BLD AUTO: 6.8 [, K/UL] (ref 4.8–10.8)

## 2017-05-10 PROCEDURE — 99283 EMERGENCY DEPT VISIT LOW MDM: CPT

## 2017-05-10 PROCEDURE — 80053 COMPREHEN METABOLIC PANEL: CPT

## 2017-05-10 PROCEDURE — 83690 ASSAY OF LIPASE: CPT

## 2017-05-10 PROCEDURE — 85025 COMPLETE CBC W/AUTO DIFF WBC: CPT

## 2017-05-10 PROCEDURE — 93005 ELECTROCARDIOGRAM TRACING: CPT

## 2017-05-10 PROCEDURE — 96361 HYDRATE IV INFUSION ADD-ON: CPT

## 2017-05-10 PROCEDURE — 96374 THER/PROPH/DIAG INJ IV PUSH: CPT

## 2017-05-10 NOTE — CARD
--------------- APPROVED REPORT --------------





EKG Measurement

Heart [id number]

[place] [id number]

[id number]

[id number]

[id number]



<Conclusion>

Normal sinus rhythm

Incomplete RBBB

Minimal voltage criteria for LVH, may be normal variant

Borderline ECG

## 2017-05-10 NOTE — ED PDOC
HPI: Abdomen


Time Seen by Provider: 05/10/17 02:48


Chief Complaint (Nursing): Abdominal Pain


Chief Complaint (Provider): abdominal pain 


History Per: Patient


History/Exam Limitations: no limitations


Onset/Duration Of Symptoms: Days (1)


Outside of [place] travel?: No


Current Symptoms Are (Timing): Still Present


Additional Complaint(s): 


62yo male well known to provider and ED for multiple visits for chronic 

abdominal pain, alcohol abuse, depression, and pancreatitis presents to the ED 

with complaints of abdominal pain x 1 day. Patient reports multiple episodes of 

vomiting that are non-bloody and non-bilious. Symptoms consistent with previous 

episodes of pancreatitis. Denies alcohol use or any other medical complaints. 








Past Medical History


Reviewed: Historical Data, Nursing Documentation, Vital Signs


Vital Signs: 


 Last Vital Signs











Temp  98.1 F   05/10/17 02:52


 


Pulse  80   05/10/17 02:52


 


Resp  17   05/10/17 02:52


 


BP  140/84   05/10/17 02:52


 


Pulse Ox  98   05/10/17 05:12














- Medical History


PMH: Anxiety, Depression, HTN, Pancreatitis (Recurrent), Chronic Pain (abdominal

)


   Denies: HIV, Chronic Kidney Disease





- Surgical History


Surgical History: No Surg Hx





- Family History


Family History: States: No Known Family Hx





- Social History


Current smoker - smoking cessation education provided: No


Alcohol: Other (hx of alcohol abuse)


Drugs: Denies





- Home Medications


Home Medications: 


 Ambulatory Orders











 Medication  Instructions  Recorded


 


Alprazolam [Xanax] 0.5 mg PO TID 16


 


Paroxetine HCl [Paxil] 40 mg PO DAILY 16


 


oxyCODONE [oxyCODONE Immediate 5 mg PO Q6 PRN 17





Release Tab]  


 


Dicyclomine [Bentyl] 10 mg PO Q8 #20 cap 17


 


Amylase/Lipase/Protease [Pancrease 25,000 u PO TIDWM #30 ecc 17





07029 U-5000 U-[id number] U]  


 


Pantoprazole [Protonix EC Tab] 40 mg PO DAILY #30 ect 17


 


Dicyclomine [Bentyl] 20 mg PO Q12 PRN #20 tab 17


 


Dicyclomine [Bentyl] 20 mg PO Q12 PRN #20 tab 05/10/17


 


Ondansetron ODT [Zofran ODT] 4 mg PO Q6 PRN #16 odt 05/10/17














- Allergies


Allergies/Adverse Reactions: 


 Allergies











Allergy/AdvReac Type Severity Reaction Status Date / Time


 


No Known Allergies Allergy   Verified 17 01:23














Review of Systems


ROS Statement: Except As Marked, All Systems Reviewed And Found Negative


Gastrointestinal: Positive for: Vomiting, Abdominal Pain





Physical Exam





- Reviewed


Nursing Documentation Reviewed: Yes


Vital Signs Reviewed: Yes





- Physical Exam


Appears: Positive for: Well, No Acute Distress


Head Exam: Positive for: ATRAUMATIC, NORMAL INSPECTION, NORMOCEPHALIC


Skin: Positive for: Normal Color, Warm, Dry


Eye Exam: Positive for: Normal appearance, EOMI, PERRL


ENT: Positive for: Normal ENT Inspection


Neck: Positive for: Normal, Painless ROM, Supple


Cardiovascular/Chest: Positive for: Regular Rate, Rhythm.  Negative for: Murmur

, Tachycardia


Respiratory: Positive for: Normal Breath Sounds.  Negative for: Wheezing, 

Respiratory Distress


Gastrointestinal/Abdominal: Positive for: Soft, Tenderness (mild epigastric ).  

Negative for: Guarding, Rebound


Back: Positive for: Normal Inspection.  Negative for: L CVA Tenderness, R CVA 

Tenderness


Extremity: Positive for: Normal ROM.  Negative for: Deformity, Swelling


Neurologic/Psych: Positive for: Alert, Oriented





- Laboratory Results


Result Diagrams: 


 05/10/17 04:05





 05/10/17 04:05





- ECG


O2 Sat by Pulse Oximetry: 98


Pulse Ox Interpretation: Normal (RA)





Medical Decision Making


Medical Decision Makin:


Impression: 62yo male w/ abdominal pain in setting of previous pancreatitis and 

history of alcoholism 





Plan:


EKG


Labs


Bentyl 20mg PO, Zofran 4mg IV, IVF


reassess





0524: Labs reviewed, show no clinically significant abnormalities. Patient 

stable for d/c.





Dx: gastroenteritis, abdominal pain


Rx: Bentyl, Zofran


Stable 








-------------------


Scribe Attestation:


Documented by BONIFACIO Mahoney acting as a scribe for Lukas Reynolds MD.   


Provider Scribe Attestation:


All medical record entries made by the Scribe were at my direction and 

personally dictated by me. I


have reviewed the chart and agree that the record accurately reflects my 

personal performance of the


history, physical exam, medical decision making, and the department course for 

this patient. I have


also personally directed, reviewed, and agree with the discharge instructions 

and disposition.  








Disposition





- Clinical Impression


Clinical Impression: 


 Abdominal pain, Gastroenteritis








- Patient ED Disposition


Is Patient to be Admitted: No





- Disposition


Referrals: 


Kennedy Hahn MD [Primary Care Provider] - 


Disposition: Routine/Home


Disposition Time: 05:24


Condition: STABLE


Prescriptions: 


Dicyclomine [Bentyl] 20 mg PO Q12 PRN #20 tab


 PRN Reason: abdominal pain/diarrhea


Ondansetron ODT [Zofran ODT] 4 mg PO Q6 PRN #16 odt


 PRN Reason: Nausea/Vomiting


Instructions:  Gastroenteritis (ED)

## 2017-08-06 ENCOUNTER — HOSPITAL ENCOUNTER (EMERGENCY)
Dept: HOSPITAL 14 - H.ER | Age: 62
Discharge: HOME | End: 2017-08-06
Payer: MEDICARE

## 2017-08-06 VITALS
OXYGEN SATURATION: 98 % | DIASTOLIC BLOOD PRESSURE: 95 MMHG | HEART RATE: 94 BPM | TEMPERATURE: 98.5 F | RESPIRATION RATE: 18 BRPM | SYSTOLIC BLOOD PRESSURE: 164 MMHG

## 2017-08-06 VITALS — BODY MASS INDEX: 19.5 KG/M2

## 2017-08-06 DIAGNOSIS — F32.9: ICD-10-CM

## 2017-08-06 DIAGNOSIS — I10: ICD-10-CM

## 2017-08-06 DIAGNOSIS — G89.29: ICD-10-CM

## 2017-08-06 DIAGNOSIS — F41.9: ICD-10-CM

## 2017-08-06 DIAGNOSIS — R10.9: Primary | ICD-10-CM

## 2017-08-06 LAB
ALBUMIN SERPL-MCNC: 4 G/DL (ref 3.5–5)
ALBUMIN/GLOB SERPL: 1.6 {RATIO} (ref 1–2.1)
ALT SERPL-CCNC: 35 U/L (ref 21–72)
AST SERPL-CCNC: 23 U/L (ref 17–59)
BASOPHILS # BLD AUTO: 0 K/UL (ref 0–0.2)
BASOPHILS NFR BLD: 0.2 % (ref 0–2)
BUN SERPL-MCNC: 15 MG/DL (ref 9–20)
CALCIUM SERPL-MCNC: 9.4 MG/DL (ref 8.4–10.2)
EOSINOPHIL # BLD AUTO: 0.1 K/UL (ref 0–0.7)
EOSINOPHIL NFR BLD: 1.3 % (ref 0–4)
ERYTHROCYTE [DISTWIDTH] IN BLOOD BY AUTOMATED COUNT: 14 % (ref 11.5–14.5)
GFR NON-AFRICAN AMERICAN: > 60
HGB BLD-MCNC: 13 G/DL (ref 12–18)
LIPASE SERPL-CCNC: 213 U/L (ref 23–300)
LYMPHOCYTES # BLD AUTO: 1.6 K/UL (ref 1–4.3)
LYMPHOCYTES NFR BLD AUTO: 18.8 % (ref 20–40)
MCH RBC QN AUTO: 32.5 PG (ref 27–31)
MCHC RBC AUTO-ENTMCNC: 34 G/DL (ref 33–37)
MCV RBC AUTO: 95.7 FL (ref 80–94)
MONOCYTES # BLD: 0.6 K/UL (ref 0–0.8)
MONOCYTES NFR BLD: 6.7 % (ref 0–10)
NEUTROPHILS # BLD: 6.4 K/UL (ref 1.8–7)
NEUTROPHILS NFR BLD AUTO: 73 % (ref 50–75)
NRBC BLD AUTO-RTO: 0 % (ref 0–0)
PLATELET # BLD: 178 K/UL (ref 130–400)
PMV BLD AUTO: 8.3 FL (ref 7.2–11.7)
RBC # BLD AUTO: 4.01 MIL/UL (ref 4.4–5.9)
WBC # BLD AUTO: 8.7 K/UL (ref 4.8–10.8)

## 2017-08-06 PROCEDURE — 93005 ELECTROCARDIOGRAM TRACING: CPT

## 2017-08-06 PROCEDURE — 85025 COMPLETE CBC W/AUTO DIFF WBC: CPT

## 2017-08-06 PROCEDURE — 96360 HYDRATION IV INFUSION INIT: CPT

## 2017-08-06 PROCEDURE — 99282 EMERGENCY DEPT VISIT SF MDM: CPT

## 2017-08-06 PROCEDURE — 83690 ASSAY OF LIPASE: CPT

## 2017-08-06 PROCEDURE — 80053 COMPREHEN METABOLIC PANEL: CPT

## 2017-08-06 NOTE — ED PDOC
HPI: Abdomen


Time Seen by Provider: 08/06/17 00:28


Chief Complaint (Nursing): Abdominal Pain


Chief Complaint (Provider): Abdominal pain, central 


History Per: Patient


History/Exam Limitations: no limitations


Onset/Duration Of Symptoms: Days


Outside of [place] travel?: No


Current Symptoms Are (Timing): Still Present


Location Of Pain/Discomfort: Epigastric, Periumbilical


Quality Of Discomfort: Dull, Burning


Associated Symptoms: Nausea, Vomiting, Diarrhea, Loss Of Appetite.  denies: 

Fever, Chills, Back Pain, Chest Pain, Constipation, Urinary Symptoms


Additional Complaint(s): 





Pt states he is having central abdominal pain x 2 days. Pt also reports N/V/D. 

Pt states that this is similar to previous episode of pancreatitis. Pt states 

he has not been drinking in the last year. 





Past Medical History


Reviewed: Historical Data, Nursing Documentation, Vital Signs


Vital Signs: 





 Last Vital Signs











Temp  98.5 F   08/06/17 00:23


 


Pulse  94 H  08/06/17 00:23


 


Resp  18   08/06/17 00:23


 


BP  164/95 H  08/06/17 00:23


 


Pulse Ox  98   08/06/17 00:23














- Medical History


PMH: Anxiety, Depression, HTN, Pancreatitis (Recurrent), Chronic Pain (abdominal

)


   Denies: HIV, Chronic Kidney Disease





- Surgical History


Surgical History: No Surg Hx





- Family History


Family History: States: No Known Family Hx





- Home Medications


Home Medications: 


 Ambulatory Orders











 Medication  Instructions  Recorded


 


Alprazolam [Xanax] 0.5 mg PO TID 12/12/16


 


Paroxetine HCl [Paxil] 40 mg PO DAILY 12/12/16


 


oxyCODONE [oxyCODONE Immediate 5 mg PO Q6 PRN 02/22/17





Release Tab]  


 


Dicyclomine [Bentyl] 10 mg PO Q8 #20 cap 03/19/17


 


Amylase/Lipase/Protease [Pancrease 25,000 u PO TIDWM #30 ecc 04/12/17





45328 U-5000 U-[id number] U]  


 


Pantoprazole [Protonix EC Tab] 40 mg PO DAILY #30 ect 04/12/17


 


Dicyclomine [Bentyl] 20 mg PO Q12 PRN #20 tab 04/14/17


 


Dicyclomine [Bentyl] 20 mg PO Q12 PRN #20 tab 05/10/17


 


Ondansetron ODT [Zofran ODT] 4 mg PO Q6 PRN #16 odt 05/10/17














- Allergies


Allergies/Adverse Reactions: 


 Allergies











Allergy/AdvReac Type Severity Reaction Status Date / Time


 


No Known Allergies Allergy   Verified 04/14/17 01:23














Review of Systems


ROS Statement: Except As Marked, All Systems Reviewed And Found Negative


Gastrointestinal: Positive for: Nausea, Vomiting, Abdominal Pain, Diarrhea





Physical Exam





- Reviewed


Nursing Documentation Reviewed: Yes


Vital Signs Reviewed: Yes





- Physical Exam


Appears: Positive for: Well, Non-toxic, No Acute Distress


Head Exam: Positive for: ATRAUMATIC, NORMAL INSPECTION, NORMOCEPHALIC


Skin: Positive for: Normal Color, Warm, DRY


Eye Exam: Positive for: Normal appearance


ENT: Positive for: Normal ENT Inspection


Neck: Positive for: Normal, Painless ROM


Cardiovascular/Chest: Positive for: Regular Rate, Rhythm


Respiratory: Positive for: Normal Breath Sounds.  Negative for: Accessory 

Muscle Use, Respiratory Distress


Gastrointestinal/Abdominal: Positive for: Bowel Sounds, Soft, Tenderness (Mild 

diffuse ).  Negative for: Normal Exam


Back: Positive for: Normal Inspection


Extremity: Positive for: Normal ROM


Neurologic/Psych: Positive for: Alert, Oriented





- Laboratory Results


Result Diagrams: 


 08/06/17 01:49





 08/06/17 01:49





- ECG


O2 Sat by Pulse Oximetry: 98





Disposition





- Clinical Impression


Clinical Impression: 


 Abdominal pain








- Patient ED Disposition


Is Patient to be Admitted: No


Counseled Patient/Family Regarding: Diagnosis, Need For Followup





- Disposition


Disposition: Routine/Home


Disposition Time: 03:12


Condition: GOOD


Instructions:  Abdominal Pain (ED)

## 2017-08-06 NOTE — CARD
--------------- APPROVED REPORT --------------





EKG Measurement

Heart [id number]

[place] [id number]

[id number]

[id number]

[id number]



<Conclusion>

Normal sinus rhythm

Normal ECG

## 2017-08-12 ENCOUNTER — HOSPITAL ENCOUNTER (EMERGENCY)
Dept: HOSPITAL 14 - H.ER | Age: 62
Discharge: HOME | End: 2017-08-12
Payer: MEDICARE

## 2017-08-12 VITALS
HEART RATE: 85 BPM | RESPIRATION RATE: 16 BRPM | SYSTOLIC BLOOD PRESSURE: 116 MMHG | TEMPERATURE: 98.4 F | DIASTOLIC BLOOD PRESSURE: 77 MMHG | OXYGEN SATURATION: 96 %

## 2017-08-12 VITALS — BODY MASS INDEX: 18.1 KG/M2

## 2017-08-12 DIAGNOSIS — R10.9: Primary | ICD-10-CM

## 2017-08-12 LAB
ALBUMIN SERPL-MCNC: 4.5 G/DL (ref 3.5–5)
ALBUMIN/GLOB SERPL: 1.6 {RATIO} (ref 1–2.1)
ALT SERPL-CCNC: 35 U/L (ref 21–72)
AST SERPL-CCNC: 26 U/L (ref 17–59)
BASOPHILS # BLD AUTO: 0.1 K/UL (ref 0–0.2)
BASOPHILS NFR BLD: 0.6 % (ref 0–2)
BUN SERPL-MCNC: 15 MG/DL (ref 9–20)
CALCIUM SERPL-MCNC: 10 MG/DL (ref 8.4–10.2)
EOSINOPHIL # BLD AUTO: 0.1 K/UL (ref 0–0.7)
EOSINOPHIL NFR BLD: 0.9 % (ref 0–4)
ERYTHROCYTE [DISTWIDTH] IN BLOOD BY AUTOMATED COUNT: 14.1 % (ref 11.5–14.5)
GFR NON-AFRICAN AMERICAN: > 60
HGB BLD-MCNC: 13.9 G/DL (ref 12–18)
LIPASE SERPL-CCNC: 156 U/L (ref 23–300)
LYMPHOCYTES # BLD AUTO: 1.8 K/UL (ref 1–4.3)
LYMPHOCYTES NFR BLD AUTO: 16.6 % (ref 20–40)
MCH RBC QN AUTO: 32.5 PG (ref 27–31)
MCHC RBC AUTO-ENTMCNC: 34.2 G/DL (ref 33–37)
MCV RBC AUTO: 95.2 FL (ref 80–94)
MONOCYTES # BLD: 0.7 K/UL (ref 0–0.8)
MONOCYTES NFR BLD: 6.6 % (ref 0–10)
NEUTROPHILS # BLD: 8.2 K/UL (ref 1.8–7)
NEUTROPHILS NFR BLD AUTO: 75.3 % (ref 50–75)
NRBC BLD AUTO-RTO: 0.1 % (ref 0–0)
PLATELET # BLD: 192 K/UL (ref 130–400)
PMV BLD AUTO: 8.3 FL (ref 7.2–11.7)
RBC # BLD AUTO: 4.26 MIL/UL (ref 4.4–5.9)
WBC # BLD AUTO: 10.9 K/UL (ref 4.8–10.8)

## 2017-08-12 PROCEDURE — 80053 COMPREHEN METABOLIC PANEL: CPT

## 2017-08-12 PROCEDURE — 96374 THER/PROPH/DIAG INJ IV PUSH: CPT

## 2017-08-12 PROCEDURE — 85025 COMPLETE CBC W/AUTO DIFF WBC: CPT

## 2017-08-12 PROCEDURE — 99282 EMERGENCY DEPT VISIT SF MDM: CPT

## 2017-08-12 PROCEDURE — 96375 TX/PRO/DX INJ NEW DRUG ADDON: CPT

## 2017-08-12 PROCEDURE — 83690 ASSAY OF LIPASE: CPT

## 2017-08-12 NOTE — ED PDOC
HPI: Psych/Substance Abuse


Time Seen by Provider: 08/12/17 03:09


Chief Complaint (Nursing): Abdominal Pain


History Per: Patient


Onset/Duration Of Symptoms: Mins (prior to arrival)


Current Symptoms Are (Timing): Still Present


Additional Complaint(s): 





Buddy Glynn is a 61 year old male, well known to ED with previous medical 

history of chronic abdominal pain, alcohol abuse, depression, and pancreatitis, 

presents to the emergency department for an evaluation of a possible overdose 

prior to arrival. Patient reported multiple episodes of non-bloody and non-

bilious vomiting. Symptoms consistent with previous episodes of pancreatitis. 

Denies alcohol use or any other medical complaints. 





PMD: none provided





Past Medical History


Reviewed: Historical Data, Nursing Documentation, Vital Signs


Vital Signs: 





 Last Vital Signs











Temp  98.4 F   08/12/17 03:12


 


Pulse  85   08/12/17 03:12


 


Resp  16   08/12/17 03:12


 


BP  116/77   08/12/17 03:12


 


Pulse Ox  96   08/12/17 03:12














- Medical History


PMH: Anxiety, Depression, HTN, Pancreatitis (Recurrent), Chronic Pain (abdominal

)


   Denies: HIV, Chronic Kidney Disease





- Family History


Family History: States: Unknown Family Hx





- Social History


Current smoker - smoking cessation education provided: Yes


Alcohol: > 2 Drinks/Day


Drugs: Cannabis, Cocaine





- Home Medications


Home Medications: 


 Ambulatory Orders











 Medication  Instructions  Recorded


 


Alprazolam [Xanax] 0.5 mg PO TID 12/12/16


 


Paroxetine HCl [Paxil] 40 mg PO DAILY 12/12/16


 


oxyCODONE [oxyCODONE Immediate 5 mg PO Q6 PRN 02/22/17





Release Tab]  


 


Dicyclomine [Bentyl] 10 mg PO Q8 #20 cap 03/19/17


 


Amylase/Lipase/Protease [Pancrease 25,000 u PO TIDWM #30 ecc 04/12/17





[id number] U-5000 U-[id number] U]  


 


Pantoprazole [Protonix EC Tab] 40 mg PO DAILY #30 ect 04/12/17


 


Dicyclomine [Bentyl] 20 mg PO Q12 PRN #20 tab 04/14/17


 


Dicyclomine [Bentyl] 20 mg PO Q12 PRN #20 tab 05/10/17


 


Ondansetron ODT [Zofran ODT] 4 mg PO Q6 PRN #16 odt 05/10/17














- Allergies


Allergies/Adverse Reactions: 


 Allergies











Allergy/AdvReac Type Severity Reaction Status Date / Time


 


No Known Allergies Allergy   Verified 08/12/17 03:12














Review of Systems


ROS Statement: Except As Marked, All Systems Reviewed And Found Negative


Constitutional: Positive for: Other (possible overdose)


Gastrointestinal: Positive for: Vomiting





Physical Exam





- Reviewed


Nursing Documentation Reviewed: Yes


Vital Signs Reviewed: Yes





- Physical Exam


Appears: Positive for: Well, Non-toxic, No Acute Distress


Head Exam: Positive for: ATRAUMATIC, NORMAL INSPECTION, NORMOCEPHALIC


Skin: Positive for: Normal Color


Cardiovascular/Chest: Positive for: Regular Rate, Rhythm.  Negative for: Chest 

Non Tender


Respiratory: Positive for: Normal Breath Sounds.  Negative for: Crackles, Rales

, Rhonchi, Wheezing


Gastrointestinal/Abdominal: Positive for: Normal Exam, Bowel Sounds, Soft.  

Negative for: Tenderness


Neurologic/Psych: Positive for: Alert





- Laboratory Results


Result Diagrams: 


 08/12/17 04:00





 08/12/17 04:00





- ECG


O2 Sat by Pulse Oximetry: 96 (RA)


Pulse Ox Interpretation: Normal





Medical Decision Making


Medical Decision Making: 





Initial Impression: Possible overdose





Initial Plan:


* Alcohol serum


* Labs


* Lipase


* Urine dipstick


* Bentyl 20mg


* Toradol 10mg





Time: 0450


--Labs: no significant abnormality. Noted ETOH level = 50.


--Provider reinforced patient to abstain from alcohol use. 


--Patient to follow up with Dr. Oralia ELENA.





Clinical Impression: Abdominal pain





--------------------------------------------------------------------------------

-----------------


Scribe Attestation:


Documented by Carleen Ge, acting as a scribe for Lukas Reynolds MD.





Provider Scribe Attestation:


All medical record entries made by the Scribe were at my direction and 

personally dictated by me. I have reviewed the chart and agree that the record 

accurately reflects my personal performance of the history, physical exam, 

medical decision making, and the department course for this patient. I have 

also personally directed, reviewed, and agree with the discharge instructions 

and disposition.





Disposition





- Clinical Impression


Clinical Impression: 


 Abdominal pain








- Patient ED Disposition


Is Patient to be Admitted: No


Doctor Will See Patient In The: Office


Counseled Patient/Family Regarding: Studies Performed, Diagnosis, Need For 

Followup





- Disposition


Referrals: 


[facility] [Outside]


Oralia SCOTT,MD Sarika [Medical Doctor] - 


Disposition: Routine/Home


Disposition Time: 04:50


Condition: STABLE


Instructions:  Abdominal Pain (ED)


Forms:  [facility] (English)

## 2017-08-22 ENCOUNTER — HOSPITAL ENCOUNTER (EMERGENCY)
Dept: HOSPITAL 14 - H.ER | Age: 62
Discharge: HOME | End: 2017-08-22
Payer: MEDICARE

## 2017-08-22 VITALS
OXYGEN SATURATION: 99 % | TEMPERATURE: 97 F | HEART RATE: 94 BPM | DIASTOLIC BLOOD PRESSURE: 69 MMHG | RESPIRATION RATE: 16 BRPM | SYSTOLIC BLOOD PRESSURE: 147 MMHG

## 2017-08-22 VITALS — BODY MASS INDEX: 18.1 KG/M2

## 2017-08-22 DIAGNOSIS — K85.90: ICD-10-CM

## 2017-08-22 DIAGNOSIS — M54.9: Primary | ICD-10-CM

## 2017-08-22 DIAGNOSIS — F32.9: ICD-10-CM

## 2017-08-22 DIAGNOSIS — F41.9: ICD-10-CM

## 2017-08-22 DIAGNOSIS — G89.29: ICD-10-CM

## 2017-08-22 DIAGNOSIS — I10: ICD-10-CM
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
## 2017-09-26 ENCOUNTER — HOSPITAL ENCOUNTER (INPATIENT)
Dept: HOSPITAL 31 - C.ER | Age: 62
LOS: 3 days | Discharge: LEFT BEFORE DISCHARGE | DRG: 439 | End: 2017-09-29
Payer: MEDICARE

## 2017-09-26 VITALS — BODY MASS INDEX: 18.1 KG/M2

## 2017-09-26 DIAGNOSIS — K29.70: ICD-10-CM

## 2017-09-26 DIAGNOSIS — K76.0: ICD-10-CM

## 2017-09-26 DIAGNOSIS — E83.51: ICD-10-CM

## 2017-09-26 DIAGNOSIS — K85.90: Primary | ICD-10-CM

## 2017-09-26 DIAGNOSIS — F11.20: ICD-10-CM

## 2017-09-26 DIAGNOSIS — K29.80: ICD-10-CM

## 2017-09-26 DIAGNOSIS — K21.0: ICD-10-CM

## 2017-09-26 DIAGNOSIS — K86.1: ICD-10-CM

## 2017-09-26 DIAGNOSIS — K25.7: ICD-10-CM

## 2017-09-26 DIAGNOSIS — F10.10: ICD-10-CM

## 2017-09-26 DIAGNOSIS — F17.200: ICD-10-CM

## 2017-09-26 DIAGNOSIS — I10: ICD-10-CM

## 2017-09-26 LAB
ALBUMIN/GLOB SERPL: 1.6 {RATIO} (ref 1–2.1)
ALP SERPL-CCNC: 66 U/L (ref 38–126)
ALT SERPL-CCNC: 27 U/L (ref 21–72)
APTT BLD: 30 SECONDS (ref 21–34)
AST SERPL-CCNC: 28 U/L (ref 17–59)
BACTERIA #/AREA URNS HPF: (no result) /[HPF]
BASOPHILS # BLD AUTO: 0.1 K/UL (ref 0–0.2)
BASOPHILS NFR BLD: 1 % (ref 0–2)
BILIRUB SERPL-MCNC: 0.8 MG/DL (ref 0.2–1.3)
BILIRUB UR-MCNC: NEGATIVE MG/DL
BUN SERPL-MCNC: 17 MG/DL (ref 9–20)
CALCIUM SERPL-MCNC: 9.8 MG/DL (ref 8.6–10.4)
CHLORIDE SERPL-SCNC: 105 MMOL/L (ref 98–107)
CO2 SERPL-SCNC: 25 MMOL/L (ref 22–30)
EOSINOPHIL # BLD AUTO: 0 K/UL (ref 0–0.7)
EOSINOPHIL NFR BLD: 0.5 % (ref 0–4)
ERYTHROCYTE [DISTWIDTH] IN BLOOD BY AUTOMATED COUNT: 13.7 % (ref 11.5–14.5)
ETHANOL SERPL-MCNC: < 10 MG/DL (ref 0–10)
GLOBULIN SER-MCNC: 3 GM/DL (ref 2.2–3.9)
GLUCOSE SERPL-MCNC: 78 MG/DL (ref 75–110)
GLUCOSE UR STRIP-MCNC: NORMAL MG/DL
HCT VFR BLD CALC: 42.9 % (ref 35–51)
INR PPP: 1.1
KETONES UR STRIP-MCNC: NEGATIVE MG/DL
LEUKOCYTE ESTERASE UR-ACNC: (no result) LEU/UL
LYMPHOCYTES # BLD AUTO: 1.8 K/UL (ref 1–4.3)
LYMPHOCYTES NFR BLD AUTO: 21.5 % (ref 20–40)
MCH RBC QN AUTO: 32.7 PG (ref 27–31)
MCHC RBC AUTO-ENTMCNC: 34.5 G/DL (ref 33–37)
MCV RBC AUTO: 94.8 FL (ref 80–94)
MONOCYTES # BLD: 0.5 K/UL (ref 0–0.8)
MONOCYTES NFR BLD: 5.9 % (ref 0–10)
NRBC BLD AUTO-RTO: 0.1 % (ref 0–2)
PH UR STRIP: 5 [PH] (ref 5–8)
PLATELET # BLD: 216 K/UL (ref 130–400)
PMV BLD AUTO: 7.8 FL (ref 7.2–11.7)
POTASSIUM SERPL-SCNC: 4.7 MMOL/L (ref 3.6–5.2)
PROT SERPL-MCNC: 7.6 G/DL (ref 6.3–8.3)
PROT UR STRIP-MCNC: NEGATIVE MG/DL
RBC # UR STRIP: NEGATIVE /UL
RBC #/AREA URNS HPF: < 1 /HPF (ref 0–3)
SODIUM SERPL-SCNC: 143 MMOL/L (ref 132–148)
SP GR UR STRIP: 1.01 (ref 1–1.03)
UROBILINOGEN UR-MCNC: NORMAL MG/DL (ref 0.2–1)
WBC # BLD AUTO: 8.2 K/UL (ref 4.8–10.8)
WBC #/AREA URNS HPF: 2 /HPF (ref 0–5)

## 2017-09-26 NOTE — CT
PROCEDURE:  CT Abdomen and Pelvis with contrast



HISTORY:

abd pain h/o of pancreatitis



COMPARISON:

None available.



TECHNIQUE:

Contrast dose: 100 mL Omnipaque



Radiation dose:



Total exam DLP = 313.29 mGy-cm.



This CT exam was performed using one or more of the following dose 

reduction techniques: Automated exposure control, adjustment of the 

mA and/or kV according to patient size, and/or use of iterative 

reconstruction technique.



FINDINGS:



LOWER THORAX:

No visible consolidation, pleural effusion, or pneumothorax. 



Marked distal esophageal wall thickening. 



LIVER:

Hypoattenuation of the liver compatible with hepatic steatosis. 



GALLBLADDER AND BILE DUCTS:

Unremarkable. 



PANCREAS:

Several pancreatic calcifications particularly evident within the 

region of the pancreatic head/uncinate ; consistent with provided 

history of chronic pancreatitis. No peripancreatic fluid collection 

or inflammatory stranding evident. The pancreatic duct appears mildly 

prominent ; further evaluation with MRCP suggested if indicated. 



SPLEEN:

Unremarkable. 



ADRENALS:

Indeterminate 11 x 11 mm left adrenal gland nodule. 11 x 9 mm right 

adrenal gland nodule, indeterminate. 



KIDNEYS AND URETERS:

The kidneys enhance symmetrically. No hydronephrosis or obstructing 

calculus identified. Too small to characterize 4 mm hypodensity, 

right midpole. 



VASCULATURE:

Atherosclerotic calcifications of the aorta. No aortic aneurysm. 



BOWEL:

Stomach is nondistended.  



Lack of oral contrast limits evaluation for bowel pathology.  Bowel 

loops appear within normal limits of caliber without evidence of 

obstruction.



APPENDIX:

The appendix appears within normal limits of caliber. No secondary 

signs of acute appendicitis.



PERITONEUM:

No significant free fluid. No definite free air. 



LYMPH NODES:

No bulky adenopathy identified. 



BLADDER:

Markedly thick-walled under distended urinary bladder. 



REPRODUCTIVE:

The prostate gland measures approximately 4.0 x 4.9 cm. 



BONES:

Mild degenerative changes of the spine. 



OTHER FINDINGS:

None.



IMPRESSION:

Several pancreatic calcifications particularly evident within the 

region of the pancreatic head/uncinate ; consistent with provided 

history of chronic pancreatitis. No peripancreatic fluid collection 

or inflammatory stranding evident. The pancreatic duct appears mildly 

prominent ; further evaluation with MRCP suggested if indicated.  



Bilateral 11 mm indeterminate adrenal gland nodules. Dedicated 

outpatient cross-sectional imaging may be considered for further 

characterization if indicated. 



Under distended but markedly thickened urinary bladder.  Recommend 

correlation with urinalysis.  Considerations include chronic outflow 

obstruction, infection, inflammatory or neoplastic process. Extent of 

wall thickening may be exaggerated by under distension. Follow-up as 

indicated. 



Enlarged prostate gland.  Recommend correlation with PSA. 



Markedly thickened distal esophageal wall suboptimally evaluated by 

CT ; correlate clinically.  Considerations include but not limited to 

infectious, inflammatory, or neoplastic etiologies. Follow-up as 

indicated. 



Too small to characterize 4 mm hypodensity, right renal midpole. 



Hepatic steatosis. 



Additional incidental findings as above.

## 2017-09-26 NOTE — C.PDOC
History Of Present Illness


61 yr old male with PMHx of chronic pancreatitis, presents to the ER for 

evaluation of abdominal pain which started few hours ago. Patient reports he 

has been seen multiple times for the same symptoms in the past. Patient states 

the pain is worse in epigastric area. Denies drinking alcohol, fever, chills, 

nausea, vomiting, diarrhea, constipation, dysuria, back pain, weakness or 

numbness. 


Time Seen by Provider: 17 15:07


Chief Complaint (Nursing): Chest Pain


History/Exam Limitations: no limitations


Onset/Duration Of Symptoms: Sudden Onset (PTA few hrs )





Past Medical History


Reviewed: Historical Data, Nursing Documentation, Vital Signs


Vital Signs: 


 Last Vital Signs











Temp  97.8 F   17 21:07


 


Pulse  62   17 21:07


 


Resp  20   17 21:07


 


BP  124/72   17 21:07


 


Pulse Ox  95   17 21:07














- Medical History


PMH: Anxiety, Depression, HTN, Pancreatitis (Recurrent), Chronic Pain (abdominal

)





- [facility] Procedures








INDIVID PSYCHOTHERAP NEC (13)


INJECT/INFUSE NEC (02/23/15)


INSERTION OF INFUSION DEV INTO SUP VENA CAVA, PERC APPROACH (17)


OTHER GROUP THERAPY (13)








Family History: States: No Known Family Hx





- Social History


Hx Alcohol Use: No


Hx Substance Use: No (cocaine & marajuana)





- Immunization History


Hx Tetanus Toxoid Vaccination: No


Hx Influenza Vaccination: Yes (2016)


Hx Pneumococcal Vaccination: Yes (2016)





Review Of Systems


Except As Marked, All Systems Reviewed And Found Negative.


Constitutional: Negative for: Fever, Chills


Gastrointestinal: Positive for: Abdominal Pain (epigastric ).  Negative for: 

Nausea, Vomiting, Diarrhea, Constipation


Musculoskeletal: Negative for: Back Pain


Neurological: Negative for: Weakness, Numbness





Physical Exam





- Physical Exam


Appears: Non-toxic, No Acute Distress


Skin: Warm, Dry, No Rash


Head: Atraumatic, Normacephalic


Oral Mucosa: Moist


Chest: Symmetrical, No Tenderness


Cardiovascular: Rhythm Regular, No Murmur


Respiratory: Normal Breath Sounds, No Rales, No Rhonchi, No Stridor, No Wheezing


Gastrointestinal/Abdominal: Soft, Tenderness (Mild epigastric ), No Guarding, 

No Rebound





ED Course And Treatment





- Laboratory Results


Result Diagrams: 


 17 16:08





 17 16:08


ECG: Interpreted By Me, Viewed By Me


ECG Rhythm: Sinus Rhythm


ECG Interpretation: Normal


Interpretation Of ECG: No ST/T wave changes.


Rate From EC (BPM)





- CT Scan/US


  ** CT - Abd & Pelvis


Other Rad Studies (CT/US): Read By Radiologist, Radiology Report Reviewed


CT/US Interpretation: PROCEDURE:  CT Abdomen and Pelvis with contrast.  HISTORY

:  abd pain h/o of pancreatitis.  COMPARISON:  None available.  TECHNIQUE:  

Contrast dose: 100 mL Omnipaque.  Radiation dose:  Total exam DLP = 313.29 mGy-

cm.  This CT exam was performed using one or more of the following dose 

reduction techniques: Automated exposure control, adjustment of the mA and/or 

kV according to patient size, and/or use of iterative reconstruction technique.

  FINDINGS:  LOWER THORAX:  No visible consolidation, pleural effusion, or 

pneumothorax.  Marked distal esophageal wall thickening.  LIVER:  

Hypoattenuation of the liver compatible with hepatic steatosis.  GALLBLADDER 

AND BILE DUCTS:  Unremarkable.  PANCREAS:  Several pancreatic calcifications 

particularly evident within the region of the pancreatic head/uncinate ; 

consistent with provided history of chronic pancreatitis. No peripancreatic 

fluid collection or inflammatory stranding evident. The pancreatic duct appears 

mildly prominent ; further evaluation with MRCP suggested if indicated.  SPLEEN

:  Unremarkable.  ADRENALS:  Indeterminate 11 x 11 mm left adrenal gland 

nodule. 11 x 9 mm right adrenal gland nodule, indeterminate.  KIDNEYS AND 

URETERS:  The kidneys enhance symmetrically. No hydronephrosis or obstructing 

calculus identified. Too small to characterize 4 mm hypodensity, right midpole.

  VASCULATURE:  Atherosclerotic calcifications of the aorta. No aortic 

aneurysm.  BOWEL:  Stomach is nondistended.  Lack of oral contrast limits 

evaluation for bowel pathology.  Bowel loops appear within normal limits of 

caliber without evidence of obstruction.  APPENDIX:  The appendix appears 

within normal limits of caliber. No secondary signs of acute appendicitis.  

PERITONEUM:  No significant free fluid. No definite free air.  LYMPH NODES:  No 

bulky adenopathy identified.  BLADDER:  Markedly thick-walled under distended 

urinary bladder.  REPRODUCTIVE:  The prostate gland measures approximately 4.0 

x 4.9 cm.  BONES:  Mild degenerative changes of the spine.  OTHER FINDINGS:  

None.  IMPRESSION:  Several pancreatic calcifications particularly evident 

within the region of the pancreatic head/uncinate ; consistent with provided 

history of chronic pancreatitis. No peripancreatic fluid collection or 

inflammatory stranding evident. The pancreatic duct appears mildly prominent ; 

further evaluation with MRCP suggested if indicated.  Bilateral 11 mm 

indeterminate adrenal gland nodules. Dedicated outpatient cross-sectional 

imaging may be considered for further characterization if indicated.  Under 

distended but markedly thickened urinary bladder.  Recommend correlation with 

urinalysis.  Considerations include chronic outflow obstruction, infection, 

inflammatory or neoplastic process. Extent of wall thickening may be 

exaggerated by under distension. Follow-up as indicated.  Enlarged prostate 

gland.  Recommend correlation with PSA.  Markedly thickened distal esophageal 

wall suboptimally evaluated by CT ; correlate clinically.  Considerations 

include but not limited to infectious, inflammatory, or neoplastic etiologies. 

Follow-up as indicated.  Too small to characterize 4 mm hypodensity, right 

renal midpole.  Hepatic steatosis.  Additional incidental findings as above.





Medical Decision Making


Medical Decision Making: 


PLAN:


* CT - Abd & Pelvis 


* CXR


* EKG


* Alcohol Serum 


* CBC


* CMP


* Urinalysis 


* Toradol IVP


* Sodium Chloride IV 





lipase eleaveted. h/o of chronic pancreatitis. pt accepted by dr peacock for 

admission. 





Disposition





- Disposition


Disposition: HOSPITALIZED


Disposition Time: 07:00


Condition: GOOD





- Clinical Impression


Clinical Impression: 


 Pancreatitis








- Scribe Statement


The provider has reviewed the documentation as recorded by the Karon العراقي


Provider Attestation: 


All medical record entries made by the Karon were at my direction and 

personally dictated by me. I have reviewed the chart and agree that the record 

accurately reflects my personal performance of the history, physical exam, 

medical decision making, and the department course for this patient. I have 

also personally directed, reviewed, and agree with the discharge instructions 

and disposition.





Decision To Admit





- Pt Status Changed To:


Hospital Disposition Of: Inpatient





- Admit Certification


Admit to Inpatient:: After my assessment, the patient will require 

hospitalization for at least two midnights.  This is because of the severity of 

symptoms shown, intensity of services needed, and/or the medical risk in this 

patient being treated as an outpatient.





- InPatient:


Physician Admission Certification: I certify that this patient requires 2 or 

more midnights of care for the following reason:: pancreatitis, needs bowel rest





- .


Bed Request Type: Regular


Admitting Physician: Rozina Peacock


Patient Diagnosis: 


 Pancreatitis

## 2017-09-27 LAB
BUN SERPL-MCNC: 23 MG/DL (ref 9–20)
CALCIUM SERPL-MCNC: 8.4 MG/DL (ref 8.6–10.4)
CHLORIDE SERPL-SCNC: 104 MMOL/L (ref 98–107)
CO2 SERPL-SCNC: 23 MMOL/L (ref 22–30)
GLUCOSE SERPL-MCNC: 85 MG/DL (ref 75–110)
POTASSIUM SERPL-SCNC: 4.5 MMOL/L (ref 3.6–5.2)
SODIUM SERPL-SCNC: 140 MMOL/L (ref 132–148)

## 2017-09-27 RX ADMIN — HYDROMORPHONE HYDROCHLORIDE PRN MG: 1 INJECTION, SOLUTION INTRAMUSCULAR; INTRAVENOUS; SUBCUTANEOUS at 00:37

## 2017-09-27 RX ADMIN — HYDROMORPHONE HYDROCHLORIDE PRN MG: 1 INJECTION, SOLUTION INTRAMUSCULAR; INTRAVENOUS; SUBCUTANEOUS at 16:30

## 2017-09-27 RX ADMIN — HYDROMORPHONE HYDROCHLORIDE PRN MG: 1 INJECTION, SOLUTION INTRAMUSCULAR; INTRAVENOUS; SUBCUTANEOUS at 04:32

## 2017-09-27 RX ADMIN — HYDROMORPHONE HYDROCHLORIDE PRN MG: 1 INJECTION, SOLUTION INTRAMUSCULAR; INTRAVENOUS; SUBCUTANEOUS at 20:43

## 2017-09-27 RX ADMIN — HYDROMORPHONE HYDROCHLORIDE PRN MG: 1 INJECTION, SOLUTION INTRAMUSCULAR; INTRAVENOUS; SUBCUTANEOUS at 08:30

## 2017-09-27 RX ADMIN — HYDROMORPHONE HYDROCHLORIDE PRN MG: 1 INJECTION, SOLUTION INTRAMUSCULAR; INTRAVENOUS; SUBCUTANEOUS at 12:16

## 2017-09-27 RX ADMIN — ENOXAPARIN SODIUM SCH MG: 30 INJECTION SUBCUTANEOUS at 09:51

## 2017-09-27 NOTE — CP.PCM.CON
<Greg Bryant - Last Filed: 09/27/17 13:18>





History of Present Illness





- History of Present Illness


History of Present Illness: 





PGY5 GI Fellow Consult Note


Patient is a 62yo male with PMHx significant for EtOH abuse, polysubstance abuse

, tobacco abuse, chronic pancreatitis who presented to the ED with abdominal 

pain. Pt states pain began suddenly yesterday without any obvious preceding 

cause. He vehemently denies any EtOH use recently and states his last drink was 

one year ago despite lab work showing EtOH in his system one month ago. Pain is 

improved overall since admission but was previously located in the epigastrium 

and radiated to both flanks.  Denies any change in bowel habits, weight loss, 

nausea, vomiting, fever, chills. Tolerated full tray of breakfast this morning 

without difficulty. States he is avoiding fried, fatty foods. Patient had not 

followed up as an outpatient as previously requested.





PMHx: See HPI


PSHx: Denies any prior surgeries


FHx: Discussed with patient and denies any significant family history


Social: Current daily tobacco use, prior EtOH and polysubstance abuse


Endo: Admits to prior endoscopy/colonoscopy at [facility] but cannot provide reports/

findings/details of procedures





Review of Systems





- Constitutional


Constitutional: absent: Anorexia, Chills, Fever, Weight Loss





- EENT


Eyes: absent: Change in Vision


Nose/Mouth/Throat: absent: Sore Throat





- Cardiovascular


Cardiovascular: absent: Chest Pain, Dyspnea, Edema





- Respiratory


Respiratory: absent: Cough, Dyspnea, Excessive Mucous Production





- Gastrointestinal


Gastrointestinal: Abdominal Pain, Nausea.  absent: Bloating, Change in Bowel 

Habits, Change in Stool Character, Constipation, Cramping, Diarrhea, Dyspepsia, 

Dysphagia, Hematemesis, Hematochezia, Melena, Vomiting





- Genitourinary


Genitourinary: absent: Dysuria, Urinary Frequency, Urinary Urgency





- Musculoskeletal


Musculoskeletal: absent: Back Pain, Neck Pain





- Integumentary


Integumentary: absent: New Lesions, Rash





- Neurological


Neurological: absent: Dizziness, Numbness, Focal Weakness





- Psychiatric


Psychiatric: absent: Anxiety, Depression





- Endocrine


Endocrine: absent: Polydipsia, Polyphagia, Polyuria





- Hematologic/Lymphatic


Hematologic: absent: Easy Bleeding, Easy Bruising, Lymphadenopathy





Past Patient History





- Infectious Disease


Hx of Infectious Diseases: None





- Past Medical History & Family History


Past Medical History?: Yes





- Past Social History


Smoking Status: Heavy Smoker > 10 Cigarettes Daily





- CARDIAC


Hx Hypertension: Yes





- PULMONARY


Hx Respiratory Disorders: No





- NEUROLOGICAL


Hx Neurological Disorder: No





- HEENT


Hx HEENT Problems: No





- RENAL


Hx Chronic Kidney Disease: No





- ENDOCRINE/METABOLIC


Hx Endocrine Disorders: No





- HEMATOLOGICAL/ONCOLOGICAL


Hx Human Immunodeficiency Virus (HIV): No





- INTEGUMENTARY


Hx Dermatological Problems: No





- MUSCULOSKELETAL/RHEUMATOLOGICAL


Hx Falls: Yes





- GASTROINTESTINAL


Hx Pancreatitis: Yes (Recurrent)





- GENITOURINARY/GYNECOLOGICAL


Hx Genitourinary Disorders: No





- PSYCHIATRIC


Hx Anxiety: Yes


Hx Depression: Yes


Hx Substance Use: No (cocaine & marajuana)





- SURGICAL HISTORY


Hx Surgeries: No





- ANESTHESIA


Hx Anesthesia: No


Hx Anesthesia Reactions: No


Hx Malignant Hyperthermia: No





Meds


Allergies/Adverse Reactions: 


 Allergies











Allergy/AdvReac Type Severity Reaction Status Date / Time


 


No Known Allergies Allergy   Verified 09/26/17 14:41














- Medications


Medications: 


 Current Medications





Alprazolam (Xanax)  0.5 mg PO TID [facility]


   Last Admin: 09/27/17 09:51 Dose:  0.5 mg


Enoxaparin Sodium (Lovenox)  30 mg SC DAILY [facility]


   Last Admin: 09/27/17 09:51 Dose:  30 mg


Hydromorphone HCl (Dilaudid)  0.5 mg IVP Q4H PRN


   PRN Reason: Pain, severe (8-10)


   Last Admin: 09/27/17 08:30 Dose:  0.5 mg


Pantoprazole Sodium (Protonix Inj)  40 mg IVP DAILY [facility]


   Last Admin: 09/27/17 09:52 Dose:  40 mg


Paroxetine HCl (Paxil)  40 mg PO DAILY [facility]


   Last Admin: 09/27/17 09:51 Dose:  40 mg











Physical Exam





- Constitutional


Appears: Non-toxic, No Acute Distress





- Eye Exam


Eye Exam: EOMI, PERRL





- ENT Exam


ENT Exam: Mucous Membranes Moist





- Respiratory Exam


Respiratory Exam: Clear to Auscultation Bilateral.  absent: Rales, Rhonchi, 

Wheezes





- Cardiovascular Exam


Cardiovascular Exam: RRR, +S1, +S2





- GI/Abdominal Exam


GI & Abdominal Exam: Normal Bowel Sounds, Soft, Tenderness (mild epigastric).  

absent: Distended, Firm, Guarding, Rigid





- Extremities Exam


Extremities exam: Positive for: normal inspection.  Negative for: pedal edema





- Neurological Exam


Neurological exam: Alert, Oriented x3





- Psychiatric Exam


Psychiatric exam: Normal Affect, Normal Mood





- Skin


Skin Exam: Dry, Warm





Results





- Vital Signs


Recent Vital Signs: 


 Last Vital Signs











Temp  97.5 F L  09/27/17 07:55


 


Pulse  61   09/27/17 07:55


 


Resp  20   09/27/17 07:55


 


BP  101/64   09/27/17 07:55


 


Pulse Ox  97   09/27/17 07:55














- Labs


Result Diagrams: 


 09/26/17 16:08





 09/26/17 16:08


Labs: 


 Laboratory Results - last 24 hr











  09/26/17 09/26/17 09/26/17





  16:08 16:08 16:08


 


WBC  8.2  


 


RBC  4.53  


 


Hgb  14.8  


 


Hct  42.9  


 


MCV  94.8 H  


 


MCH  32.7 H  


 


MCHC  34.5  


 


RDW  13.7  


 


Plt Count  216  


 


MPV  7.8  


 


Neut % (Auto)  71.1  


 


Lymph % (Auto)  21.5  


 


Mono % (Auto)  5.9  


 


Eos % (Auto)  0.5  


 


Baso % (Auto)  1.0  


 


Neut #  5.8  


 


Lymph #  1.8  


 


Mono #  0.5  


 


Eos #  0.0  


 


Baso #  0.1  


 


PT   12.2 


 


INR   1.1 


 


APTT   30 


 


Sodium    143


 


Potassium    4.7


 


Chloride    105


 


Carbon Dioxide    25


 


Anion Gap    17


 


BUN    17


 


Creatinine    0.8


 


Est GFR ( Amer)    > 60


 


Est GFR (Non-Af Amer)    > 60


 


Random Glucose    78


 


Calcium    9.8


 


Total Bilirubin    0.8


 


AST    28


 


ALT    27


 


Alkaline Phosphatase    66


 


Total Protein    7.6


 


Albumin    4.7


 


Globulin    3.0


 


Albumin/Globulin Ratio    1.6


 


Lipase    569 H


 


Urine Color   


 


Urine Clarity   


 


Urine pH   


 


Ur Specific Gravity   


 


Urine Protein   


 


Urine Glucose (UA)   


 


Urine Ketones   


 


Urine Blood   


 


Urine Nitrate   


 


Urine Bilirubin   


 


Urine Urobilinogen   


 


Ur Leukocyte Esterase   


 


Urine WBC (Auto)   


 


Urine RBC (Auto)   


 


Urine Bacteria   


 


Urine Sperm (Auto)   


 


Alcohol, Quantitative    < 10














  09/26/17





  16:15


 


WBC 


 


RBC 


 


Hgb 


 


Hct 


 


MCV 


 


MCH 


 


MCHC 


 


RDW 


 


Plt Count 


 


MPV 


 


Neut % (Auto) 


 


Lymph % (Auto) 


 


Mono % (Auto) 


 


Eos % (Auto) 


 


Baso % (Auto) 


 


Neut # 


 


Lymph # 


 


Mono # 


 


Eos # 


 


Baso # 


 


PT 


 


INR 


 


APTT 


 


Sodium 


 


Potassium 


 


Chloride 


 


Carbon Dioxide 


 


Anion Gap 


 


BUN 


 


Creatinine 


 


Est GFR ( Amer) 


 


Est GFR (Non-Af Amer) 


 


Random Glucose 


 


Calcium 


 


Total Bilirubin 


 


AST 


 


ALT 


 


Alkaline Phosphatase 


 


Total Protein 


 


Albumin 


 


Globulin 


 


Albumin/Globulin Ratio 


 


Lipase 


 


Urine Color  Yellow


 


Urine Clarity  Clear


 


Urine pH  5.0


 


Ur Specific [place]  1.011


 


Urine Protein  Negative


 


Urine Glucose (UA)  Normal


 


Urine Ketones  Negative


 


Urine Blood  Negative


 


Urine Nitrate  Negative


 


Urine Bilirubin  Negative


 


Urine Urobilinogen  Normal


 


Ur Leukocyte Esterase  Neg


 


Urine WBC (Auto)  2


 


Urine RBC (Auto)  < 1


 


Urine Bacteria  Rare


 


Urine Sperm (Auto)  Rare H


 


Alcohol, Quantitative 














Assessment & Plan





- Assessment and Plan (Free Text)


Assessment: 





Patient is a 62yo male with PMHx significant for EtOH abuse, polysubstance abuse

, tobacco abuse, chronic pancreatitis who presented to the ED with abdominal 

pain.


-Chronic pancreatitis


-Current tobacco use


-Hepatic steatosis


-Abnormal imaging of the GI tract on CT


-Opioid dependence with pain seeking behavior


-H/O polysubstance abuse


-H/O EtOH abuse


Plan: 


-CT IV contrast reviewed - gastric and esophageal thickening may be noted in 

absence of PO contrast; pt denies any complaints at present


-Consider EGD given above findings, pt currently refusing but will discuss 

again and make NPO past MN in anticipation of procedure tomorrow if agreeable


-No clinical evidence of cirrhosis despite prior EtOH abuse history


-Encourage smoking cessation


-Patient tolerating full diet


-Start Pancreaze 7 tabs PO with meals





- Date & Time


Date: 09/27/17


Time: 07:30





<Darshan Brandt - Last Filed: 09/27/17 15:01>





Meds





- Medications


Medications: 


 Current Medications





Alprazolam (Xanax)  0.5 mg PO TID [facility]


   Last Admin: 09/27/17 14:00 Dose:  0.5 mg


Enoxaparin Sodium (Lovenox)  30 mg SC DAILY [facility]


   Last Admin: 09/27/17 09:51 Dose:  30 mg


Hydromorphone HCl (Dilaudid)  0.5 mg IVP Q4H PRN


   PRN Reason: Pain, severe (8-10)


   Last Admin: 09/27/17 12:16 Dose:  0.5 mg


Pantoprazole Sodium (Protonix Inj)  40 mg IVP DAILY [facility]


   Last Admin: 09/27/17 09:52 Dose:  40 mg


Paroxetine HCl (Paxil)  40 mg PO DAILY [facility]


   Last Admin: 09/27/17 09:51 Dose:  40 mg











Results





- Vital Signs


Recent Vital Signs: 


 Last Vital Signs











Temp  97.5 F L  09/27/17 07:55


 


Pulse  61   09/27/17 07:55


 


Resp  20   09/27/17 07:55


 


BP  101/64   09/27/17 07:55


 


Pulse Ox  97   09/27/17 07:55














- Labs


Result Diagrams: 


 09/26/17 16:08





 09/27/17 14:04


Labs: 


 Laboratory Results - last 24 hr











  09/26/17 09/26/17 09/26/17





  16:08 16:08 16:08


 


WBC  8.2  


 


RBC  4.53  


 


Hgb  14.8  


 


Hct  42.9  


 


MCV  94.8 H  


 


MCH  32.7 H  


 


MCHC  34.5  


 


RDW  13.7  


 


Plt Count  216  


 


MPV  7.8  


 


Neut % (Auto)  71.1  


 


Lymph % (Auto)  21.5  


 


Mono % (Auto)  5.9  


 


Eos % (Auto)  0.5  


 


Baso % (Auto)  1.0  


 


Neut #  5.8  


 


Lymph #  1.8  


 


Mono #  0.5  


 


Eos #  0.0  


 


Baso #  0.1  


 


PT   12.2 


 


INR   1.1 


 


APTT   30 


 


Sodium    143


 


Potassium    4.7


 


Chloride    105


 


Carbon Dioxide    25


 


Anion Gap    17


 


BUN    17


 


Creatinine    0.8


 


Est GFR ( Amer)    > 60


 


Est GFR (Non-Af Amer)    > 60


 


Random Glucose    78


 


Calcium    9.8


 


Total Bilirubin    0.8


 


AST    28


 


ALT    27


 


Alkaline Phosphatase    66


 


Total Protein    7.6


 


Albumin    4.7


 


Globulin    3.0


 


Albumin/Globulin Ratio    1.6


 


Lipase    569 H


 


Urine Color   


 


Urine Clarity   


 


Urine pH   


 


Ur Specific Gravity   


 


Urine Protein   


 


Urine Glucose (UA)   


 


Urine Ketones   


 


Urine Blood   


 


Urine Nitrate   


 


Urine Bilirubin   


 


Urine Urobilinogen   


 


Ur Leukocyte Esterase   


 


Urine WBC (Auto)   


 


Urine RBC (Auto)   


 


Urine Bacteria   


 


Urine Sperm (Auto)   


 


Alcohol, Quantitative    < 10














  09/26/17 09/27/17





  16:15 14:04


 


WBC  


 


RBC  


 


Hgb  


 


Hct  


 


MCV  


 


MCH  


 


MCHC  


 


RDW  


 


Plt Count  


 


MPV  


 


Neut % (Auto)  


 


Lymph % (Auto)  


 


Mono % (Auto)  


 


Eos % (Auto)  


 


Baso % (Auto)  


 


Neut #  


 


Lymph #  


 


Mono #  


 


Eos #  


 


Baso #  


 


PT  


 


INR  


 


APTT  


 


Sodium   140


 


Potassium   4.5


 


Chloride   104


 


Carbon Dioxide   23


 


Anion Gap   18


 


BUN   23 H


 


Creatinine   0.9


 


Est GFR ( Amer)   > 60


 


Est GFR (Non-Af Amer)   > 60


 


Random Glucose   85


 


Calcium   8.4 L


 


Total Bilirubin  


 


AST  


 


ALT  


 


Alkaline Phosphatase  


 


Total Protein  


 


Albumin  


 


Globulin  


 


Albumin/Globulin Ratio  


 


Lipase   243


 


Urine Color  Yellow 


 


Urine Clarity  Clear 


 


Urine pH  5.0 


 


Ur Specific [place]  1.011 


 


Urine Protein  Negative 


 


Urine Glucose (UA)  Normal 


 


Urine Ketones  Negative 


 


Urine Blood  Negative 


 


Urine Nitrate  Negative 


 


Urine Bilirubin  Negative 


 


Urine Urobilinogen  Normal 


 


Ur Leukocyte Esterase  Neg 


 


Urine WBC (Auto)  2 


 


Urine RBC (Auto)  < 1 


 


Urine Bacteria  Rare 


 


Urine Sperm (Auto)  Rare H 


 


Alcohol, Quantitative  














Attending/Attestation





- Attestation


I have personally seen and examined this patient.: Yes


I have fully participated in the care of the patient.: Yes


I have reviewed all pertinent clinical information: Yes


Notes (Text): 





09/27/17 14:59


61 year old male with h/o EtOH abuse, chronic pancreatitis a/w abdominal pain.


1. Chronic pancreatitis


2. Abnormal CT scan of GI tract


Plan:


-recommend low fat diet


-start pancreatic enzymes with meals and PPI daily


-smoking cessation advised


-elective endoscopy recommended


-elective endoscopic ultrasound recommended


-ok for discharge from gi perspective

## 2017-09-28 VITALS — RESPIRATION RATE: 20 BRPM

## 2017-09-28 LAB
AMYLASE SERPL-CCNC: 179 U/L (ref 30–110)
BUN SERPL-MCNC: 17 MG/DL (ref 9–20)
CALCIUM SERPL-MCNC: 9.4 MG/DL (ref 8.6–10.4)
CHLORIDE SERPL-SCNC: 103 MMOL/L (ref 98–107)
CHOLEST SERPL-MCNC: 138 MG/DL (ref 0–199)
CO2 SERPL-SCNC: 28 MMOL/L (ref 22–30)
ERYTHROCYTE [DISTWIDTH] IN BLOOD BY AUTOMATED COUNT: 13.7 % (ref 11.5–14.5)
FOLATE SERPL-MCNC: 9.1 NG/ML
GLUCOSE SERPL-MCNC: 73 MG/DL (ref 75–110)
HCT VFR BLD CALC: 40.3 % (ref 35–51)
IRON SERPL-MCNC: 37 UG/DL (ref 49–181)
MCH RBC QN AUTO: 32.8 PG (ref 27–31)
MCHC RBC AUTO-ENTMCNC: 34.3 G/DL (ref 33–37)
MCV RBC AUTO: 95.4 FL (ref 80–94)
PLATELET # BLD: 189 K/UL (ref 130–400)
PMV BLD AUTO: 8.5 FL (ref 7.2–11.7)
POTASSIUM SERPL-SCNC: 4.8 MMOL/L (ref 3.6–5.2)
SODIUM SERPL-SCNC: 145 MMOL/L (ref 132–148)
TSH SERPL-ACNC: 1.68 MIU/L (ref 0.46–4.68)
WBC # BLD AUTO: 5.5 K/UL (ref 4.8–10.8)

## 2017-09-28 PROCEDURE — 0DB68ZX EXCISION OF STOMACH, VIA NATURAL OR ARTIFICIAL OPENING ENDOSCOPIC, DIAGNOSTIC: ICD-10-PCS | Performed by: INTERNAL MEDICINE

## 2017-09-28 PROCEDURE — 0DB98ZX EXCISION OF DUODENUM, VIA NATURAL OR ARTIFICIAL OPENING ENDOSCOPIC, DIAGNOSTIC: ICD-10-PCS | Performed by: INTERNAL MEDICINE

## 2017-09-28 RX ADMIN — HYDROMORPHONE HYDROCHLORIDE PRN MG: 1 INJECTION, SOLUTION INTRAMUSCULAR; INTRAVENOUS; SUBCUTANEOUS at 00:37

## 2017-09-28 RX ADMIN — HYDROMORPHONE HYDROCHLORIDE PRN MG: 1 INJECTION, SOLUTION INTRAMUSCULAR; INTRAVENOUS; SUBCUTANEOUS at 14:07

## 2017-09-28 RX ADMIN — HYDROMORPHONE HYDROCHLORIDE PRN MG: 1 INJECTION, SOLUTION INTRAMUSCULAR; INTRAVENOUS; SUBCUTANEOUS at 04:36

## 2017-09-28 RX ADMIN — HYDROMORPHONE HYDROCHLORIDE PRN MG: 1 INJECTION, SOLUTION INTRAMUSCULAR; INTRAVENOUS; SUBCUTANEOUS at 08:31

## 2017-09-28 RX ADMIN — ENOXAPARIN SODIUM SCH: 30 INJECTION SUBCUTANEOUS at 10:00

## 2017-09-28 RX ADMIN — HYDROMORPHONE HYDROCHLORIDE PRN MG: 1 INJECTION, SOLUTION INTRAMUSCULAR; INTRAVENOUS; SUBCUTANEOUS at 18:06

## 2017-09-28 RX ADMIN — HYDROMORPHONE HYDROCHLORIDE PRN MG: 1 INJECTION, SOLUTION INTRAMUSCULAR; INTRAVENOUS; SUBCUTANEOUS at 22:11

## 2017-09-28 NOTE — HP
The patient is a 61-year-old male.



CHIEF COMPLAINT:  Abdominal pain.



HISTORY OF PRESENT ILLNESS:  The patient is a 61-year-old male with past

medical history of chronic pancreatitis, came to the emergency room of

[facility] for abdominal pain evaluation that started few hours ago. 

The patient reports that he multiple times came to the emergency for same

symptoms in the past.  Pain is worse in the epigastric area.  Denies

drinking alcohol, fever or chills, nausea, vomiting, diarrhea,

constipation, dysuria, back pain, weakness, or numbness.  Notes the pain

was sudden onset.  No relieving or exacerbating factors.



PAST MEDICAL HISTORY:  Anxiety, depression, hypertension, recurrent

pancreatitis, chronic abdominal pain.



FAMILY HISTORY:  Father and mother, noncontributory.



HABITS:  According to him, no alcohol use, substance cocaine and marijuana

abuse.  Smoking, yes.



ALLERGIES:  THE PATIENT IS NOT ALLERGIC WITH ANY MEDICATIONS.



HOME MEDICATIONS:  Reviewed by me.



REVIEW OF SYSTEMS:  The patient was seen and examined on the bedside,

looking comfortable.  No nausea, vomiting, or diarrhea.  No hematuria or

hematochezia.  No swelling of the legs.  Epigastric abdominal pain.  No

fever.  No chills.



PHYSICAL EXAMINATION

VITAL SIGNS:  Temperature 98.1, pulse 56, blood pressure 112/67, and

respiratory rate 20.

HEENT:  Head, normocephalic and atraumatic.  Eyes, PERRLA.  Extraocular

muscles intact.  Conjunctivae clear.  Nose patent.

NECK:  Supple.  No carotid bruits.  No JVD or thyromegaly.

CHEST:  Bilaterally symmetrical.

HEART:  S1 and S2 positive.

LUNGS:  Clear to auscultation.

ABDOMEN:  Soft.  Bowel sounds positive.  No organomegaly.

EXTREMITIES:  No edema.  No cyanosis.

NEUROLOGICAL:  The patient is awake and alert.  Moving all 4 extremities. 

No focal deficits.



LABORATORY DATA:  White blood cell 8.2, hemoglobin 14.8, hematocrit 42.9,

and platelets 216.  Sodium 140, potassium 4.5, BUN 23, creatinine 0.9,

calcium 8.4, lipase noted 



ASSESSMENT AND PLAN:  The patient is a 61-year-old male with hypocalcemia,

pancreatitis, lipase is high but trending down.  Alcohol level is zero. 

Seen by Dr. Darshan Brandt.  The patient has a history of significant ethanol

abuse, polysubstance abuse, tobacco abuse according to old records. 

Chronic pancreatitis, came with abdominal pain, it is acute on chronic,

gastroesophageal reflux disease, chronic tobacco use, hepatic steatosis,

abnormal imaging of gastrointestinal tract on CT, opioid dependence with

pain seeking behavior, history of polysubstance abuse.  CT with IV

contrast, gastritis  noted.  Consider EGD given above findings.  The

patient is currently refusing, but we will discuss again and made n.p.o.

past midnight for procedure tomorrow if he is agreeable as per GI.  No

clinical evidence of cirrhosis despite prior ethanol abuse.  Encouraged to

quit smoking.  The patient is tolerating full diet.  Started Pancrease 7

tablets p.o. with meals.  Appreciated Dr. Rikki Sexton's input. 

Gastrointestinal and deep venous thrombosis prophylaxis.  Repeat labs.  We

will follow up.







__________________________________________

Rozina Peacock MD





DD:  09/27/2017 21:56:27

DT:  09/28/2017 1:10:03

Job # [id number]





[email or web address]

## 2017-09-28 NOTE — CP.PCM.PN
Subjective





- Date & Time of Evaluation


Date of Evaluation: 09/28/17


Time of Evaluation: 12:35





- Subjective


Subjective: 





Patient seen and examined, resting in bed comfortably.  He continues to endorse 

epigastric abdominal pain but otherwise denies nausea, vomiting, diarrhea, fever

/chills.  Asking to have his diet advanced and eager to go home.  s/p EGD today 

showing duodenitis, multiple shallow antral ulcers, gastritis.





Objective





- Vital Signs/Intake and Output


Vital Signs (last 24 hours): 


 











Temp Pulse Resp BP Pulse Ox


 


 97.6 F   60   20   118/66   96 


 


 09/28/17 07:00  09/28/17 07:00  09/28/17 07:00  09/28/17 07:00  09/28/17 07:00








Intake and Output: 


 











 09/28/17 09/28/17





 06:59 18:59


 


Intake Total 320 


 


Balance 320 














- Medications


Medications: 


 Current Medications





Alprazolam (Xanax)  0.5 mg PO TID [facility]


   Last Admin: 09/28/17 10:00 Dose:  Not Given


Enoxaparin Sodium (Lovenox)  30 mg SC DAILY [facility]


   Last Admin: 09/28/17 10:00 Dose:  Not Given


Hydromorphone HCl (Dilaudid)  0.5 mg IVP Q4H PRN


   PRN Reason: Pain, severe (8-10)


   Last Admin: 09/28/17 08:31 Dose:  0.5 mg


Pantoprazole Sodium (Protonix Ec Tab)  40 mg PO DAILY [facility]


Paroxetine HCl (Paxil)  40 mg PO DAILY [facility]


   Last Admin: 09/28/17 09:59 Dose:  Not Given











- Labs


Labs: 


 





 09/28/17 11:27 





 09/28/17 11:27 





 











PT  12.2 SECONDS (9.7-12.2)   09/26/17  16:08    


 


INR  1.1   09/26/17  16:08    


 


APTT  30 SECONDS (21-34)   09/26/17  16:08    














Assessment and Plan





- Assessment and Plan (Free Text)


Assessment: 





Polysubstance abuse


Chronic pancreatitis


Abdominal pain - s/p EGD showing duodenitis, multiple shallow antral ulcers, 

gastritis, irregular GEJ


Plan: 





- Advance diet to low fat as tolerated


- Continue with PPI therapy


- Follow up biopsy results from EGD


- Continue with pancreatic enzyme supplementation therapy


- Suggest additional outpatient GI follow up of chronic pancreatitis, dilated 

PD.  No further planned intervention, from GI standpoint ok to discharge 

patient home.  Will sign off case, please reconsult as necessary, thank you.

## 2017-09-29 VITALS
HEART RATE: 70 BPM | SYSTOLIC BLOOD PRESSURE: 123 MMHG | OXYGEN SATURATION: 97 % | DIASTOLIC BLOOD PRESSURE: 76 MMHG | TEMPERATURE: 98 F

## 2017-09-29 RX ADMIN — HYDROMORPHONE HYDROCHLORIDE PRN MG: 1 INJECTION, SOLUTION INTRAMUSCULAR; INTRAVENOUS; SUBCUTANEOUS at 06:05

## 2017-09-29 RX ADMIN — HYDROMORPHONE HYDROCHLORIDE PRN MG: 1 INJECTION, SOLUTION INTRAMUSCULAR; INTRAVENOUS; SUBCUTANEOUS at 02:05

## 2017-09-29 NOTE — CARD
--------------- APPROVED REPORT --------------





EKG Measurement

Heart [id number]

OR [id number]

[id number]

[id number]

[id number]



<Conclusion>

Normal sinus rhythm

Right atrial enlargement

Borderline ECG

## 2017-09-29 NOTE — PN
DATE:



SUBJECTIVE:  The patient is a 61 years old male.  The patient was seen and

examined at the bedside in the endoscopy department, looking comfortable,

was ready to go for upper endoscopy.  Abdominal pain is getting better, but

still there.  No nausea, vomiting, diarrhea.  No fever.  No chills. 

Hungry, wants to advance diet and wants to go home.



PHYSICAL EXAMINATION:

VITAL SIGNS:  Temperature 97.6, pulse 60, respiratory rate 20, blood

pressure 118/56, pulse oxymetry 96%.

HEENT:  Head, normocephalic and atraumatic.  Eyes, PERRLA.  Extraocular

muscle intact.  Conjunctivae clear.  Nose patent.  Mucous membrane moist.

NECK:  Supple.  No carotid bruits.  No JVD or thyromegaly.

CHEST:  Bilaterally symmetrical.

HEART:  S1 and S2 positive.

LUNGS:  Clear to auscultation.

ABDOMEN:  Soft.  Bowel sounds positive.  No organomegaly.

EXTREMITIES:  No edema.  No cyanosis.

NEUROLOGICAL:  The patient is awake and alert.  Moving all 4 extremities. 

No focal deficits.



MEDICATIONS:  Xanax, Lovenox, Dilaudid, Protonix.



LABORATORY DATA:  White blood cells 5.5, hemoglobin 13.8, hematocrit noted 

platelets 189.  Sodium 145, potassium 4.8, BUN 70, creatinine 0.8.  Glucose

73.



ASSESSMENT AND PLAN:  The patient is a 61 years old male with history of

polysubstance abuse, chronic pancreatitis, came with acute on chronic

pancreatitis, abdominal pain.  Small-caliber esophagogastroduodenoscopy

showing duodenitis, multiple shallow antral ulcers, gastritis, irregular

jejunum.  Plan is advance diet to low fat as tolerated.  Continue proton

pump inhibitor.  Follow up with biopsy results from

esophagogastroduodenoscopy.  Continue pancreatic enzyme supplementation

therapy.  The patient will need gastroenterology followup for chronic

pancreatitis, dilated pancreatic duct.  Discussion done with Dr. Sanjay Juares.  Abdominopelvic ultrasound was done, results reviewed, we will

follow up.



__________________________________________

Rozina Peacock MD





DD:  09/28/2017 22:43:58

DT:  09/29/2017 0:27:08

Job # [id number]



KAMI

## 2017-09-30 NOTE — DS
Left against medical advice on 09/28/2017



CHIEF COMPLAINT:  Abdominal pain.



HISTORY OF PRESENT ILLNESS:  Mr. Devante Chu is 61 years old male last

known to me, I see him on-call, has past medical history of chronic

pancreatitis, and came to the emergency room of [facility] with

abdominal pain started few hours ago.  The patient reports that he has

multiple times visit to the emergency room with the same abdominal pain. 

Pain is worse in the epigastric area.  Denies drinking alcohol, fever,

chills, nausea, vomiting, diarrhea, constipation, or dysuria.  We admitted

the patient, did CAT scan of abdomen and pelvis, chest x-ray, seen by Dr. Sanjay Juares, GI, went for upper endoscopy, plan was to offer food, plan

was to progress food slowly as tolerated, but the patient signed against

medical advice.  He wants to follow up with his own primary care physician,

but the patient advised do not drink, urge to quit drinking and will go

back and follow up with GI and primary care physician.



PAST MEDICAL HISTORY:  Anxiety, depression, hypertension, recurrent

pancreatitis, and chronic abdominal pain.



FAMILY HISTORY:  Father and mother noncontributory.



HABITS:  As per the patient, no alcohol abuse, but as per documentation,

states history of substance abuse, cocaine, and marijuana.  Smoking, yes.



ALLERGIES:  THE PATIENT IS NOT ALLERGIC WITH ANY MEDICATIONS.



HOME MEDICATIONS:  Reviewed by me.



REVIEW OF SYSTEMS:  The patient was seen and examined at the bedside in the

morning, looking comfortable.  No nausea, vomiting, or diarrhea.  Abdominal

pain is better, not gone yet, and taking food.



PHYSICAL EXAMINATION:

VITAL SIGNS:  Temperature 98, pulse 70, blood pressure 123/76, and

respiratory rate 20.

HEENT:  Head; normocephalic and atraumatic.  Eyes; PERRLA.  Extraocular

muscles intact.  Conjunctivae clear.  Nose patent.  Mucous membrane moist.

NECK:  Supple.  No carotid bruits, JVD, or thyromegaly.

CHEST:  Bilaterally symmetrical.

HEART:  S1 and S2 positive.

LUNGS:  Clear to auscultation.

ABDOMEN:  Soft.  Bowel sounds positive.  No organomegaly.

EXTREMITIES:  No edema.  No cyanosis.

NEUROLOGICAL:  The patient is awake and alert.  Moving all 4 extremities. 

No focal deficits.



LABORATORY DATA:  White blood cell 5.5, hemoglobin 13.8, hematocrit 40.3,

and platelets 189.  Sodium 145, potassium 4.8, BUN 17, and creatinine 0.8. 

Glucose 73.  Iron 37.



ASSESSMENT AND PLAN:  Mr. Devante Chu is 61 years old male admitted for

acute on chronic pancreatitis, history of polysubstance abuse, esophageal

endoscopy done showing duodenitis, multiple small antral ulcers, gastritis,

and irregular jejunum.  Diet advanced as tolerated.  Continue proton pump

inhibitor, biopsy sent, and has to followup.  Continue pancreatic enzyme

supplementation as per GI.  The patient need to followup on acute on

chronic pancreatitis and dilated pancreatic duct.  The patient was seen by

GI.  Plan was to advance diet slowly and see the effect until the patient's

abdominal pain is gone, but he is decided to go against medical advice, he

left, he was educated, and follow up with his primary care physician and

gastroenterologist, urge to quit addiction and other substances.





__________________________________________

Rozina Peacock MD





DD:  09/30/2017 9:47:30

DT:  09/30/2017 11:39:35

Job # [id number]

## 2017-10-03 ENCOUNTER — HOSPITAL ENCOUNTER (EMERGENCY)
Dept: HOSPITAL 31 - C.ER | Age: 62
Discharge: HOME | End: 2017-10-03
Payer: MEDICARE

## 2017-10-03 VITALS — RESPIRATION RATE: 18 BRPM

## 2017-10-03 VITALS — SYSTOLIC BLOOD PRESSURE: 126 MMHG | HEART RATE: 85 BPM | DIASTOLIC BLOOD PRESSURE: 79 MMHG

## 2017-10-03 VITALS — OXYGEN SATURATION: 96 %

## 2017-10-03 VITALS — TEMPERATURE: 98.4 F

## 2017-10-03 VITALS — BODY MASS INDEX: 18.1 KG/M2

## 2017-10-03 DIAGNOSIS — R10.84: Primary | ICD-10-CM

## 2017-10-03 LAB
ALBUMIN/GLOB SERPL: 1.2 {RATIO} (ref 1–2.1)
ALP SERPL-CCNC: 66 U/L (ref 38–126)
ALT SERPL-CCNC: 29 U/L (ref 21–72)
AST SERPL-CCNC: 22 U/L (ref 17–59)
BASOPHILS # BLD AUTO: 0.1 K/UL (ref 0–0.2)
BASOPHILS NFR BLD: 0.9 % (ref 0–2)
BILIRUB SERPL-MCNC: 0.4 MG/DL (ref 0.2–1.3)
BUN SERPL-MCNC: 19 MG/DL (ref 9–20)
CALCIUM SERPL-MCNC: 9.8 MG/DL (ref 8.6–10.4)
CHLORIDE SERPL-SCNC: 103 MMOL/L (ref 98–107)
CO2 SERPL-SCNC: 25 MMOL/L (ref 22–30)
EOSINOPHIL # BLD AUTO: 0.1 K/UL (ref 0–0.7)
EOSINOPHIL NFR BLD: 1.2 % (ref 0–4)
ERYTHROCYTE [DISTWIDTH] IN BLOOD BY AUTOMATED COUNT: 13.6 % (ref 11.5–14.5)
GLOBULIN SER-MCNC: 3.5 GM/DL (ref 2.2–3.9)
GLUCOSE SERPL-MCNC: 95 MG/DL (ref 75–110)
HCT VFR BLD CALC: 39.8 % (ref 35–51)
LYMPHOCYTES # BLD AUTO: 1.9 K/UL (ref 1–4.3)
LYMPHOCYTES NFR BLD AUTO: 26.1 % (ref 20–40)
MCH RBC QN AUTO: 32.9 PG (ref 27–31)
MCHC RBC AUTO-ENTMCNC: 34.4 G/DL (ref 33–37)
MCV RBC AUTO: 95.7 FL (ref 80–94)
MONOCYTES # BLD: 0.7 K/UL (ref 0–0.8)
MONOCYTES NFR BLD: 9.9 % (ref 0–10)
NRBC BLD AUTO-RTO: 0.1 % (ref 0–2)
PLATELET # BLD: 209 K/UL (ref 130–400)
PMV BLD AUTO: 8 FL (ref 7.2–11.7)
POTASSIUM SERPL-SCNC: 4.4 MMOL/L (ref 3.6–5.2)
PROT SERPL-MCNC: 7.9 G/DL (ref 6.3–8.3)
SODIUM SERPL-SCNC: 143 MMOL/L (ref 132–148)
WBC # BLD AUTO: 7.4 K/UL (ref 4.8–10.8)

## 2017-10-03 PROCEDURE — 71010: CPT

## 2017-10-03 PROCEDURE — 96374 THER/PROPH/DIAG INJ IV PUSH: CPT

## 2017-10-03 PROCEDURE — 83690 ASSAY OF LIPASE: CPT

## 2017-10-03 PROCEDURE — 96375 TX/PRO/DX INJ NEW DRUG ADDON: CPT

## 2017-10-03 PROCEDURE — 99285 EMERGENCY DEPT VISIT HI MDM: CPT

## 2017-10-03 PROCEDURE — 85025 COMPLETE CBC W/AUTO DIFF WBC: CPT

## 2017-10-03 PROCEDURE — 80053 COMPREHEN METABOLIC PANEL: CPT

## 2017-10-03 PROCEDURE — 84484 ASSAY OF TROPONIN QUANT: CPT

## 2017-10-03 PROCEDURE — 96361 HYDRATE IV INFUSION ADD-ON: CPT

## 2017-10-03 NOTE — C.PDOC
History Of Present Illness


61 year old male, with a history of chronic pancreatitis, major depression, and 

anxiety, presents to the ED with complaints of persistent diffuse abdominal 

pain that radiates into his chest. Patient states he was seen in ED last week 

but was "paranoid I would die in a hospital so I left and I shouldn't have." 

Patient notes pain has worsened and has associated vomiting and diarrhea. He 

stopped drinking alcohol one year ago but continues to smoke cigarettes. 

Patient denies groin pain, hematemesis, bright red blood per rectum, or 

dysuria. 


Chief Complaint (Nursing): Chest Pain


History Per: Patient


History/Exam Limitations: no limitations


Onset/Duration Of Symptoms: Persistent


Current Symptoms Are (Timing): Still Present


Location Of Pain/Discomfort: Diffuse


Radiation Of Pain To:: None


Quality Of Discomfort: "Pain"


Associated Symptoms: Vomiting, Diarrhea.  denies: Fever, Chills


Exacerbating Factors: None


Alleviating Factors: None


Recent travel outside of the [place]: No





Past Medical History


Reviewed: Historical Data, Nursing Documentation, Vital Signs


Vital Signs: 


 Last Vital Signs











Temp  98.4 F   10/03/17 22:26


 


Pulse  85   10/03/17 22:26


 


Resp  18   10/03/17 22:26


 


BP  126/79   10/03/17 22:26


 


Pulse Ox  97   10/03/17 22:26














- Medical History


PMH: No Chronic Diseases, Anxiety, Depression, HTN, Pancreatitis (Recurrent), 

Chronic Pain (abdominal)


Surgical History: No Surg Hx





- [facility] Procedures








EXCISION OF DUODENUM, ENDO, DIAGN (09/26/17)


EXCISION OF STOMACH, ENDO, DIAGN (09/26/17)


INDIVID PSYCHOTHERAP NEC (06/26/13)


INJECT/INFUSE NEC (02/23/15)


INSERTION OF INFUSION DEV INTO SUP VENA CAVA, PERC APPROACH (02/23/17)


OTHER GROUP THERAPY (06/26/13)








Family History: States: Unknown Family Hx





- Social History


Hx Alcohol Use: No (STOPPED OVER A YEAR AGO)


Hx Substance Use: No (cocaine & marajuana)





- Immunization History


Hx Tetanus Toxoid Vaccination: No


Hx Influenza Vaccination: Yes (2016)


Hx Pneumococcal Vaccination: Yes (2016)





Review Of Systems


Constitutional: Negative for: Fever, Chills


Cardiovascular: Positive for: Chest Pain (radiating from abdominal pain )


Respiratory: Negative for: Shortness of Breath


Gastrointestinal: Positive for: Vomiting, Abdominal Pain, Diarrhea.  Negative 

for: Hematochezia, Hematemesis


Genitourinary: Negative for: Dysuria, Hematuria





Physical Exam





- Physical Exam


Appears: Non-toxic, In Acute Distress (in moderate distress due to pain ), 

Other (aneatic with flush facees)


Skin: Warm, Dry


Head: Atraumatic, Normacephalic


Eye(s): bilateral: Normal Inspection


Oral Mucosa: Moist


Neck: Supple


Chest: Symmetrical, No Deformity


Cardiovascular: Rhythm Regular, No Murmur


Respiratory: No Rales, No Rhonchi, No Wheezing, Other (clear to auscultation 

bilaterally )


Gastrointestinal/Abdominal: Soft, Tenderness (diffuse abdominal pain, worse on 

the left side than the right ), No Distention, No Guarding, No Rebound, No 

Hernia


Male Genital: Other (normal external genitalia )


Extremity: Normal ROM, No Tenderness, Other (no peripheral edema )


Pulses: Left Dorsalis Pedis: Normal (2+), Right Dorsalis Pedis: Normal (2+)





ED Course And Treatment





- Laboratory Results


Result Diagrams: 


 10/03/17 20:45





 10/03/17 20:45


Lab Interpretation: Normal


ECG: Interpreted By Me


ECG Rhythm: Sinus Rhythm


ECG Interpretation: Normal


Interpretation Of ECG: ekg shows NSR at 83bpm,intervals are wnl,no ectopy,st 

segs wnl T waves wnl.No old EKGs for comparison


O2 Sat by Pulse Oximetry: 96 (RA)


Progress Note: CXR and blood work were ordered. Patient was given morphine, 

zofran, and IV fluids.





Medical Decision Making


Medical Decision Making: 





Pt with hx chronic pancreatitis now with uinremarkable exam and labs.Pt is 

stable for discharge





Disposition





- Disposition


Disposition: HOME/ ROUTINE


Disposition Time: 22:43


Condition: GOOD


Additional Instructions: 


return for worsening pain,fever,intractable vomiting


Prescriptions: 


oxyCODONE/Acetaminophen [Percocet 5/325 mg Tab] 1 ea PO QID #10 tab


Instructions:  Abdominal Pain (ED)


Forms:  [facility] (English), General Discharge Instructions


Print Language: ENGLISH





- Clinical Impression


Clinical Impression: 


 Abdominal pain








- Scribe Statement


The provider has reviewed the documentation as recorded by the Scribe





Anabela Alatorre





All medical record entries made by the Scribe were at my direction and 

personally dictated by me. I have reviewed the chart and agree that the record 

accurately reflects my personal performance of the history, physical exam, 

medical decision making, and the department course for this patient. I have 

also personally directed, reviewed, and agree with the discharge instructions 

and disposition.

## 2017-10-04 NOTE — RAD
PROCEDURE:  CHEST RADIOGRAPH, 1 VIEW



HISTORY:

abd pain



COMPARISON:

Portable chest 09/26/2017.



FINDINGS:



LUNGS:

Clear.



PLEURA:

No pneumothorax or pleural fluid seen.



CARDIOVASCULAR:

Normal.



OSSEOUS STRUCTURES:

No significant abnormalities.



VISUALIZED UPPER ABDOMEN:

Normal.



OTHER FINDINGS:

None. 



IMPRESSION:

No interval acute cardiopulmonary disease appreciated.

## 2017-10-05 NOTE — CARD
--------------- APPROVED REPORT --------------





EKG Measurement

Heart [id number]

WV [id number]

[id number]

[id number]

[id number]



<Conclusion>

Normal sinus rhythm

Minimal voltage criteria for LVH, may be normal variant

Borderline ECG

## 2017-10-08 ENCOUNTER — HOSPITAL ENCOUNTER (EMERGENCY)
Dept: HOSPITAL 14 - H.ER | Age: 62
Discharge: HOME | End: 2017-10-08
Payer: MEDICARE

## 2017-10-08 VITALS
DIASTOLIC BLOOD PRESSURE: 67 MMHG | SYSTOLIC BLOOD PRESSURE: 122 MMHG | TEMPERATURE: 98.4 F | OXYGEN SATURATION: 96 % | RESPIRATION RATE: 18 BRPM

## 2017-10-08 VITALS — BODY MASS INDEX: 18.1 KG/M2

## 2017-10-08 VITALS — HEART RATE: 70 BPM

## 2017-10-08 DIAGNOSIS — K85.90: ICD-10-CM

## 2017-10-08 DIAGNOSIS — I10: ICD-10-CM

## 2017-10-08 DIAGNOSIS — K52.9: Primary | ICD-10-CM

## 2017-10-08 LAB
ALBUMIN/GLOB SERPL: 1.5 {RATIO} (ref 1–2.1)
ALP SERPL-CCNC: 65 U/L (ref 38–126)
ALT SERPL-CCNC: 27 U/L (ref 21–72)
AST SERPL-CCNC: 23 U/L (ref 17–59)
BASE EXCESS BLDV CALC-SCNC: 1.8 MMOL/L (ref 0–2)
BASOPHILS # BLD AUTO: 0 K/UL (ref 0–0.2)
BASOPHILS NFR BLD: 0.6 % (ref 0–2)
BILIRUB SERPL-MCNC: 0.2 MG/DL (ref 0.2–1.3)
BILIRUB UR-MCNC: NEGATIVE MG/DL
BUN SERPL-MCNC: 15 MG/DL (ref 9–20)
CALCIUM SERPL-MCNC: 9.9 MG/DL (ref 8.4–10.2)
CHLORIDE SERPL-SCNC: 108 MMOL/L (ref 98–107)
CO2 SERPL-SCNC: 25 MMOL/L (ref 22–30)
COLOR UR: (no result)
EOSINOPHIL # BLD AUTO: 0.1 K/UL (ref 0–0.7)
EOSINOPHIL NFR BLD: 1.7 % (ref 0–4)
ERYTHROCYTE [DISTWIDTH] IN BLOOD BY AUTOMATED COUNT: 13.6 % (ref 11.5–14.5)
GLOBULIN SER-MCNC: 2.7 GM/DL (ref 2.2–3.9)
GLUCOSE SERPL-MCNC: 85 MG/DL (ref 75–110)
GLUCOSE UR STRIP-MCNC: (no result) MG/DL
HCT VFR BLD CALC: 40 % (ref 35–51)
KETONES UR STRIP-MCNC: NEGATIVE MG/DL
LEUKOCYTE ESTERASE UR-ACNC: (no result) LEU/UL
LIPASE SERPL-CCNC: 172 U/L (ref 23–300)
LYMPHOCYTES # BLD AUTO: 1.7 K/UL (ref 1–4.3)
LYMPHOCYTES NFR BLD AUTO: 29.1 % (ref 20–40)
MCH RBC QN AUTO: 32.6 PG (ref 27–31)
MCHC RBC AUTO-ENTMCNC: 34.1 G/DL (ref 33–37)
MCV RBC AUTO: 95.6 FL (ref 80–94)
MONOCYTES # BLD: 0.4 K/UL (ref 0–0.8)
MONOCYTES NFR BLD: 7.1 % (ref 0–10)
NEUTROPHILS # BLD: 3.7 K/UL (ref 1.8–7)
NEUTROPHILS NFR BLD AUTO: 61.5 % (ref 50–75)
NRBC BLD AUTO-RTO: 0.1 % (ref 0–0)
PCO2 BLDV: 50 MMHG (ref 40–60)
PH BLDV: 7.36 [PH] (ref 7.32–7.43)
PH UR STRIP: 7 [PH] (ref 5–8)
PLATELET # BLD: 198 K/UL (ref 130–400)
PMV BLD AUTO: 7.9 FL (ref 7.2–11.7)
POTASSIUM SERPL-SCNC: 4.8 MMOL/L (ref 3.6–5)
PROT SERPL-MCNC: 7 G/DL (ref 6.3–8.2)
PROT UR STRIP-MCNC: NEGATIVE MG/DL
RBC # UR STRIP: NEGATIVE /UL
RBC #/AREA URNS HPF: < 1 /HPF (ref 0–3)
SODIUM SERPL-SCNC: 146 MMOL/L (ref 132–148)
SP GR UR STRIP: 1 (ref 1–1.03)
UROBILINOGEN UR-MCNC: (no result) MG/DL (ref 0.2–1)
WBC # BLD AUTO: 5.9 K/UL (ref 4.8–10.8)
WBC #/AREA URNS HPF: < 1 /HPF (ref 0–5)

## 2017-10-08 PROCEDURE — 80053 COMPREHEN METABOLIC PANEL: CPT

## 2017-10-08 PROCEDURE — 82803 BLOOD GASES ANY COMBINATION: CPT

## 2017-10-08 PROCEDURE — 83690 ASSAY OF LIPASE: CPT

## 2017-10-08 PROCEDURE — 99282 EMERGENCY DEPT VISIT SF MDM: CPT

## 2017-10-08 PROCEDURE — 96375 TX/PRO/DX INJ NEW DRUG ADDON: CPT

## 2017-10-08 PROCEDURE — 81003 URINALYSIS AUTO W/O SCOPE: CPT

## 2017-10-08 PROCEDURE — 93005 ELECTROCARDIOGRAM TRACING: CPT

## 2017-10-08 PROCEDURE — 84484 ASSAY OF TROPONIN QUANT: CPT

## 2017-10-08 PROCEDURE — 96374 THER/PROPH/DIAG INJ IV PUSH: CPT

## 2017-10-08 PROCEDURE — 85025 COMPLETE CBC W/AUTO DIFF WBC: CPT

## 2017-10-08 NOTE — ED PDOC
HPI: Abdomen


Time Seen by Provider: 10/08/17 13:29


Chief Complaint (Nursing): GI Problem


Chief Complaint (Provider): Abdominal Pain


History Per: Patient


History/Exam Limitations: no limitations


Onset/Duration Of Symptoms: Hrs


Outside of [place] travel?: No


Current Symptoms Are (Timing): Still Present


Quality Of Discomfort: "Pain"


Associated Symptoms: Nausea, Vomiting, Diarrhea


Additional Complaint(s): 


Buddy Glynn, a 61 year old male, with a past medical history of  chronic 

pancreatitis presents to the Ed complaining of abdominal pain associated with 

nausea, vomiting and diarrhea that started around 3a this morning. The patient 

states that the pain radiates to the lower back. Patient states his last drink 

was 1 year ago.








Past Medical History


Reviewed: Historical Data, Nursing Documentation, Vital Signs


Vital Signs: 


 Last Vital Signs











Temp  98.4 F   10/08/17 13:16


 


Pulse  84   10/08/17 13:16


 


Resp  18   10/08/17 13:16


 


BP  122/67   10/08/17 13:16


 


Pulse Ox  96   10/08/17 13:55














- Medical History


PMH: Anxiety, Depression, HTN, Pancreatitis (Recurrent), Chronic Pain (abdominal

)


   Denies: HIV, Chronic Kidney Disease





- Surgical History


Surgical History: No Surg Hx





- Family History


Family History: States: Other


Other Family History: CHF (mother)





- Living Arrangements


Living Arrangements: Alone





- Social History


Current smoker - smoking cessation education provided: Yes


Ex-Smoker (has not smoked in the last 12 months): Yes


Alcohol: None





- Immunization History


Hx Tetanus Toxoid Vaccination: No


Hx Influenza Vaccination: Yes ()


Hx Pneumococcal Vaccination: Yes ()





- Home Medications


Home Medications: 


 Ambulatory Orders











 Medication  Instructions  Recorded


 


Alprazolam [Xanax] 0.5 mg PO TID 16


 


Paroxetine HCl [Paxil] 40 mg PO DAILY 16


 


oxyCODONE/Acetaminophen [Percocet 1 ea PO QID #10 tab 10/03/17





5/325 mg Tab]  














- Allergies


Allergies/Adverse Reactions: 


 Allergies











Allergy/AdvReac Type Severity Reaction Status Date / Time


 


No Known Allergies Allergy   Verified 17 14:41














Review of Systems


ROS Statement: Except As Marked, All Systems Reviewed And Found Negative


Gastrointestinal: Positive for: Nausea, Vomiting, Abdominal Pain, Diarrhea





Physical Exam





- Reviewed


Nursing Documentation Reviewed: Yes


Vital Signs Reviewed: Yes





- Physical Exam


Appears: Positive for: Non-toxic, No Acute Distress


Head Exam: Positive for: ATRAUMATIC, NORMAL INSPECTION, NORMOCEPHALIC


Skin: Positive for: Normal Color, Warm, Dry.  Negative for: Rash


Eye Exam: Positive for: Normal appearance, EOMI, PERRL.  Negative for: Nystagmus


ENT: Positive for: Other (Dry mucous membranes)


Neck: Positive for: Normal, Painless ROM, Supple


Cardiovascular/Chest: Positive for: Regular Rate, Rhythm, Chest Non Tender.  

Negative for: Tachycardia


Respiratory: Positive for: Normal Breath Sounds.  Negative for: Rales, Rhonchi, 

Wheezing, Respiratory Distress


Gastrointestinal/Abdominal: Positive for: Bowel Sounds, Soft, Tenderness (Upper 

Abdominal Tenderness).  Negative for: Guarding, Rebound


Back: Positive for: Normal Inspection.  Negative for: L CVA Tenderness, R CVA 

Tenderness


Extremity: Positive for: Normal ROM.  Negative for: Tenderness, Deformity, 

Swelling


Lymphatic: Positive for: Normal Exam.  Negative for: Adenopathy


Neurologic/Psych: Positive for: Alert, Oriented, Gait





- Laboratory Results


Result Diagrams: 


 10/08/17 14:35








- ECG


ECG: Positive for: Interpreted By Me


ECG Rhythm: Positive for: Normal QRS


Interpretation Of ECG: 





normal


Rate: 70


O2 Sat by Pulse Oximetry: 96 (RA)


Pulse Ox Interpretation: Normal





Medical Decision Making


Medical Decision Makin


Initial Impression


61 year old male presenting with abdominal pain, nausea, vomiting and diarrhea





Initial Plan:


* VBG


* EKG


* CMP


* Lipase


* Troponin


* Udip


* CBC


* NS 1000ml IV 1000mls/hr


* Pepcid 20mg IVP


* Zofran 4mg IVP


* Urinalysis


* Reevaluation





________________________________________________________


Scribe Attestation


Documented by Paulina Julian acting as a scribe for Cara Saenz MD.





Provider Attestation


All medical record entries made by the Scribe were at my direction and 

personally dictated by me. I have reviewed the chart and agree that the record 

accurately reflects my personal performance of the history, physical exam, 

medical decision making, and the department course for this patient. I have 

also personally directed, reviewed, and agree with the discharge instructions 

and disposition.








Disposition





- Clinical Impression


Clinical Impression: 


 Gastroenteritis, Pancreatitis








- Patient ED Disposition


Is Patient to be Admitted: Transfer of Care





- Disposition


Disposition: Transfer of Care


Disposition Time: 14:52


Condition: GUARDED


Forms:  [facility] (English)


Patient Signed Over To: Diamond Saldaña

## 2017-10-08 NOTE — ED PDOC
- Laboratory Results


Result Diagrams: 


 10/08/17 14:35





 10/08/17 14:35





- ECG


O2 Sat by Pulse Oximetry: 96 (RA)


Pulse Ox Interpretation: Normal





- Progress


Re-evaluation Time: 15:50


Condition: Re-examined, Improved





Medical Decision Making


Medical Decision Making: 





Time: 15:00


--Patient is signed out to me from Dr. Cara Saenz MD, pending labs and 

reevaluation





Time: 15:08


--Urine drug screen


--Alcohol serum





Time: 15:25


--Upon reevaluation, patient is still having pain. Will give 4 mg Morphine IV 

in ED. 


-Last admission was at [facility] last month. Dx was chronic pancreatitis and pain 

control. Seen by GI who recommended PPI and outpatient endoscopy.


-Patient chart indicated history of smoking, alcohol , and substance abuse. 


--I discussed the hospital's narcotic policy with the patient. Also discussed 

[place] state regulations regarding opioid prescriptions. 


--Patient understands he needs to follow up with gastroenterologist for 

outpatient pain management. 


--There are no indications for further imaging, observation, and treatment in 

ED. ED work up did not reveal any acute emergency condition requiring emergent 

or urgent intervention or testing. Patient was given appropriate follow up. 


--


--------------------------------------------------------------------------------

-----------------


Scribe Attestation:


Documented by Valeria Rosales, acting as a scribe for Diamond Saldaña MD





Provider Scribe Attestation:


All medical record entries made by the Scribe were at my direction and 

personally dictated by me. I have reviewed the chart and agree that the record 

accurately reflects my personal performance of the history, physical exam, 

medical decision making, and the department course for this patient. I have 

also personally directed, reviewed, and agree with the discharge instructions 

and disposition.





Disposition


Doctor Will See Patient In The: Office


Counseled Patient/Family Regarding: Studies Performed, Diagnosis, Need For 

Followup





- Clinical Impression


Clinical Impression: 


 Gastroenteritis, Pancreatitis, Substance abuse, Smoker, Abdominal pain, 

Chronic abdominal pain








- POA


Present On Arrival: None





- Disposition


Referrals: 


Fazal Lux MD [Medical Doctor] - 


[facility] 


[facility] [Outside]


Disposition: Routine/Home


Disposition Time: 15:51


Condition: GOOD


Additional Instructions: 


Take your medications as instructed. Follow up with PCP, gastroenterologist, 

and pain specialist within 1 week. 


Prescriptions: 


Esomeprazole Magnesium [Nexium] 20 mg PO DAILY #30 capsule.


Instructions:  Pancreatitis (ED), Chronic Pain (ED)


Forms:  [facility] (English)

## 2017-10-09 NOTE — CARD
--------------- APPROVED REPORT --------------





EKG Measurement

Heart [id number]

AR [id number]

[id number]

[id number]

[id number]



<Conclusion>

Normal sinus rhythm

Biatrial enlargement

Abnormal ECG

## 2018-01-11 ENCOUNTER — HOSPITAL ENCOUNTER (EMERGENCY)
Dept: HOSPITAL 14 - H.ER | Age: 63
Discharge: HOME | End: 2018-01-11
Payer: MEDICARE

## 2018-01-11 VITALS
TEMPERATURE: 98.1 F | OXYGEN SATURATION: 99 % | SYSTOLIC BLOOD PRESSURE: 116 MMHG | HEART RATE: 90 BPM | DIASTOLIC BLOOD PRESSURE: 75 MMHG | RESPIRATION RATE: 18 BRPM

## 2018-01-11 VITALS — BODY MASS INDEX: 18.1 KG/M2

## 2018-01-11 DIAGNOSIS — F32.9: ICD-10-CM

## 2018-01-11 DIAGNOSIS — G89.29: ICD-10-CM

## 2018-01-11 DIAGNOSIS — I10: ICD-10-CM

## 2018-01-11 DIAGNOSIS — K85.90: ICD-10-CM

## 2018-01-11 DIAGNOSIS — K52.9: Primary | ICD-10-CM

## 2018-01-11 DIAGNOSIS — F41.9: ICD-10-CM

## 2018-01-11 LAB
ALBUMIN SERPL-MCNC: 4.4 G/DL (ref 3.5–5)
ALBUMIN/GLOB SERPL: 1.3 {RATIO} (ref 1–2.1)
ALT SERPL-CCNC: 25 U/L (ref 21–72)
AST SERPL-CCNC: 45 U/L (ref 17–59)
BASOPHILS # BLD AUTO: 0 K/UL (ref 0–0.2)
BASOPHILS NFR BLD: 0.6 % (ref 0–2)
BILIRUBIN,DIRECT: 1 MG/ML (ref 0–0.4)
BUN SERPL-MCNC: 17 MG/DL (ref 9–20)
CALCIUM SERPL-MCNC: 9.5 MG/DL (ref 8.4–10.2)
EOSINOPHIL # BLD AUTO: 0.1 K/UL (ref 0–0.7)
EOSINOPHIL NFR BLD: 1.8 % (ref 0–4)
ERYTHROCYTE [DISTWIDTH] IN BLOOD BY AUTOMATED COUNT: 13.5 % (ref 11.5–14.5)
GFR NON-AFRICAN AMERICAN: > 60
HGB BLD-MCNC: 13.3 G/DL (ref 12–18)
LIPASE SERPL-CCNC: 186 U/L (ref 23–300)
LYMPHOCYTES # BLD AUTO: 1.6 K/UL (ref 1–4.3)
LYMPHOCYTES NFR BLD AUTO: 24.7 % (ref 20–40)
MCH RBC QN AUTO: 31.5 PG (ref 27–31)
MCHC RBC AUTO-ENTMCNC: 33.3 G/DL (ref 33–37)
MCV RBC AUTO: 94.5 FL (ref 80–94)
MONOCYTES # BLD: 0.6 K/UL (ref 0–0.8)
MONOCYTES NFR BLD: 8.8 % (ref 0–10)
NEUTROPHILS # BLD: 4.1 K/UL (ref 1.8–7)
NEUTROPHILS NFR BLD AUTO: 64.1 % (ref 50–75)
NRBC BLD AUTO-RTO: 0.1 % (ref 0–0)
PLATELET # BLD: 208 K/UL (ref 130–400)
PMV BLD AUTO: 8.4 FL (ref 7.2–11.7)
RBC # BLD AUTO: 4.24 MIL/UL (ref 4.4–5.9)
WBC # BLD AUTO: 6.5 K/UL (ref 4.8–10.8)

## 2018-01-11 PROCEDURE — 80048 BASIC METABOLIC PNL TOTAL CA: CPT

## 2018-01-11 PROCEDURE — 85025 COMPLETE CBC W/AUTO DIFF WBC: CPT

## 2018-01-11 PROCEDURE — 83690 ASSAY OF LIPASE: CPT

## 2018-01-11 PROCEDURE — 99282 EMERGENCY DEPT VISIT SF MDM: CPT

## 2018-01-11 PROCEDURE — 96375 TX/PRO/DX INJ NEW DRUG ADDON: CPT

## 2018-01-11 PROCEDURE — 83615 LACTATE (LD) (LDH) ENZYME: CPT

## 2018-01-11 PROCEDURE — 80076 HEPATIC FUNCTION PANEL: CPT

## 2018-01-11 PROCEDURE — 96374 THER/PROPH/DIAG INJ IV PUSH: CPT

## 2018-01-11 PROCEDURE — 96361 HYDRATE IV INFUSION ADD-ON: CPT

## 2018-01-11 PROCEDURE — 84132 ASSAY OF SERUM POTASSIUM: CPT

## 2018-01-11 NOTE — ED PDOC
HPI:Nausea, Vomiting, Diarrhea


Time Seen by Provider: 01/11/18 19:43


Chief Complaint (Nursing): Abdominal Pain


Chief Complaint (Provider): Abdominal Pain


History Per: Patient


History/Exam Limitations: no limitations


Onset/Duration Of Symptoms: Days (x1)


Current Symptoms Are (Timing): Still Present


Additional Complaint(s): 





62 year old male with previous medical history of pancreatitis, who presents to 

the emergency department with a complaint of sharp and constant upper abdominal 

pain associated with nausea, 5 episodes of nonbloody, nonbilious vomiting and 3 

episodes of watery diarrhea ongoing for 1 day. Denied any fever, chills, 

difficulty urinating, bloody urine or incontinence. Of note, patient also 

reported drinking alcohol in the last 18 months.





PMD: none provided





Past Medical History


Reviewed: Historical Data, Nursing Documentation, Vital Signs


Vital Signs: 


 Last Vital Signs











Temp  98.1 F   01/11/18 19:07


 


Pulse  90   01/11/18 19:07


 


Resp  18   01/11/18 19:07


 


BP  116/75   01/11/18 19:07


 


Pulse Ox  99   01/11/18 19:07














- Medical History


PMH: Anxiety, Depression, HTN, Pancreatitis (Recurrent), Chronic Pain (abdominal

)


   Denies: HIV, Chronic Kidney Disease





- Family History


Family History: States: Unknown Family Hx





- Social History


Alcohol: None


Drugs: Denies





- Immunization History


Hx Tetanus Toxoid Vaccination: No


Hx Influenza Vaccination: Yes (2016)


Hx Pneumococcal Vaccination: Yes (2016)





- Home Medications


Home Medications: 


 Ambulatory Orders











 Medication  Instructions  Recorded


 


Alprazolam [Xanax] 0.5 mg PO TID 12/12/16


 


Paroxetine HCl [Paxil] 40 mg PO DAILY 12/12/16


 


oxyCODONE/Acetaminophen [Percocet 1 ea PO QID #10 tab 10/03/17





5/325 mg Tab]  


 


Chlorhexidine 0.12% [Peridex] 10 ml PO BID #1 bottle 10/08/17


 


Esomeprazole Magnesium [Nexium] 20 mg PO DAILY #30 capsule. 10/08/17


 


Dicyclomine [Dicyclomine HCl] 10 mg PO QID #30 cap 01/11/18


 


Ondansetron [Zofran] 4 mg PO Q8H #12 tab 01/11/18














- Allergies


Allergies/Adverse Reactions: 


 Allergies











Allergy/AdvReac Type Severity Reaction Status Date / Time


 


No Known Allergies Allergy   Verified 01/11/18 19:06














Review of Systems


ROS Statement: Except As Marked, All Systems Reviewed And Found Negative


Constitutional: Negative for: Fever, Chills


Gastrointestinal: Positive for: Nausea, Vomiting (nonbloody, nonbilious x5), 

Abdominal Pain (upper "stabbing"), Diarrhea (watery x3)


Genitourinary Male: Negative for: Dysuria, Incontinence, Hematuria





Physical Exam





- Reviewed


Nursing Documentation Reviewed: Yes


Vital Signs Reviewed: Yes





- Physical Exam


Appears: Positive for: Well, Non-toxic, No Acute Distress


Head Exam: Positive for: ATRAUMATIC, NORMAL INSPECTION, NORMOCEPHALIC


Skin: Positive for: Normal Color


Eye Exam: Positive for: Normal appearance


ENT: Positive for: Normal ENT Inspection


Neck: Positive for: Normal


Cardiovascular/Chest: Positive for: Regular Rate, Rhythm, Chest Non Tender


Respiratory: Positive for: Normal Breath Sounds.  Negative for: Decreased 

Breath Sounds, Wheezing, Respiratory Distress


Gastrointestinal/Abdominal: Positive for: Soft, Tenderness (epigastric when 

palpated).  Negative for: Normal Exam, Distended


Back: Positive for: Normal Inspection.  Negative for: L CVA Tenderness, R CVA 

Tenderness


Extremity: Positive for: Normal ROM (lower).  Negative for: Pedal Edema (

bilateral)


Neurologic/Psych: Positive for: Alert (x3), Oriented





- Laboratory Results


Result Diagrams: 


 01/11/18 20:10





 01/11/18 21:40





- ECG


O2 Sat by Pulse Oximetry: 99 (RA)


Pulse Ox Interpretation: Normal





Medical Decision Making


Medical Decision Making: 





Initial Impression: Pancreatitis vs. gastroenteritis





Initial Plan:   


* Alcohol serum


* BMP


* Urine drug screen


* LDH


* Lipase


* Liver profile


* CBC


* Morphine 4mg IVP


* NS 1,000ml IV per 1,000mls/hr


* Zofran inj 4mg IVP


________________________________________________________________________________

______





Time: 2200


--Upon provider reevaluation, patient is medically stable, tolerating PO, and 

requires no further treatment in the ED at this time. Patient will be 

discharged home with Rx for Dicyclomine HCL and Zofran 4mg. Counseling was 

provided and all questions were answered regarding diagnosis and need for 

follow up with PMD. There is agreement to discharge plan. Return if symptoms 

persist or worsen.





Clinical Impression: Gastroenteritis





--------------------------------------------------------------------------------

------------------


Scribe Attestation:


Documented by Carleen Ge, acting as a scribe for Ilia Contreras MD.





Provider Scribe Attestation:


All medical record entries made by the Scribe were at my direction and 

personally dictated by me. I have reviewed the chart and agree that the record 

accurately reflects my personal performance of the history, physical exam, 

medical decision making, and the department course for this patient. I have 

also personally directed, reviewed, and agree with the discharge instructions 

and disposition.





Disposition





- Clinical Impression


Clinical Impression: 


 Gastroenteritis








- Patient ED Disposition


Is Patient to be Admitted: No


Counseled Patient/Family Regarding: Studies Performed, Diagnosis, Need For 

Followup





- Disposition


Referrals: 


[facility] [Outside]


[facility] [Outside]


Disposition: Routine/Home


Disposition Time: 22:00


Condition: STABLE


Prescriptions: 


Dicyclomine [Dicyclomine HCl] 10 mg PO QID #30 cap


Ondansetron [Zofran] 4 mg PO Q8H #12 tab


Instructions:  Gastroenteritis (ED)


Forms:  [facility] (English)

## 2018-01-18 ENCOUNTER — HOSPITAL ENCOUNTER (EMERGENCY)
Dept: HOSPITAL 14 - H.ER | Age: 63
LOS: 1 days | Discharge: HOME | End: 2018-01-19
Payer: MEDICARE

## 2018-01-18 VITALS
OXYGEN SATURATION: 98 % | SYSTOLIC BLOOD PRESSURE: 150 MMHG | TEMPERATURE: 98.7 F | DIASTOLIC BLOOD PRESSURE: 83 MMHG | RESPIRATION RATE: 18 BRPM

## 2018-01-18 VITALS — BODY MASS INDEX: 18.1 KG/M2

## 2018-01-18 VITALS — HEART RATE: 79 BPM

## 2018-01-18 DIAGNOSIS — I51.7: ICD-10-CM

## 2018-01-18 DIAGNOSIS — F32.9: ICD-10-CM

## 2018-01-18 DIAGNOSIS — K52.9: Primary | ICD-10-CM

## 2018-01-18 DIAGNOSIS — K86.1: ICD-10-CM

## 2018-01-18 DIAGNOSIS — F41.9: ICD-10-CM

## 2018-01-18 DIAGNOSIS — I11.9: ICD-10-CM

## 2018-01-18 DIAGNOSIS — G89.29: ICD-10-CM

## 2018-01-18 LAB
BASOPHILS # BLD AUTO: 0 K/UL (ref 0–0.2)
BASOPHILS NFR BLD: 0.5 % (ref 0–2)
EOSINOPHIL # BLD AUTO: 0.1 K/UL (ref 0–0.7)
EOSINOPHIL NFR BLD: 1 % (ref 0–4)
ERYTHROCYTE [DISTWIDTH] IN BLOOD BY AUTOMATED COUNT: 13.5 % (ref 11.5–14.5)
HGB BLD-MCNC: 14.2 G/DL (ref 12–18)
LYMPHOCYTES # BLD AUTO: 1.4 K/UL (ref 1–4.3)
LYMPHOCYTES NFR BLD AUTO: 18.5 % (ref 20–40)
MCH RBC QN AUTO: 32.2 PG (ref 27–31)
MCHC RBC AUTO-ENTMCNC: 33.8 G/DL (ref 33–37)
MCV RBC AUTO: 95.3 FL (ref 80–94)
MONOCYTES # BLD: 0.5 K/UL (ref 0–0.8)
MONOCYTES NFR BLD: 7 % (ref 0–10)
NEUTROPHILS # BLD: 5.6 K/UL (ref 1.8–7)
NEUTROPHILS NFR BLD AUTO: 73 % (ref 50–75)
NRBC BLD AUTO-RTO: 0.1 % (ref 0–0)
PLATELET # BLD: 215 K/UL (ref 130–400)
PMV BLD AUTO: 8.1 FL (ref 7.2–11.7)
RBC # BLD AUTO: 4.42 MIL/UL (ref 4.4–5.9)
WBC # BLD AUTO: 7.7 K/UL (ref 4.8–10.8)

## 2018-01-18 PROCEDURE — 93005 ELECTROCARDIOGRAM TRACING: CPT

## 2018-01-18 PROCEDURE — 85025 COMPLETE CBC W/AUTO DIFF WBC: CPT

## 2018-01-18 PROCEDURE — 96361 HYDRATE IV INFUSION ADD-ON: CPT

## 2018-01-18 PROCEDURE — 80053 COMPREHEN METABOLIC PANEL: CPT

## 2018-01-18 PROCEDURE — 96375 TX/PRO/DX INJ NEW DRUG ADDON: CPT

## 2018-01-18 PROCEDURE — 83690 ASSAY OF LIPASE: CPT

## 2018-01-18 PROCEDURE — 99282 EMERGENCY DEPT VISIT SF MDM: CPT

## 2018-01-18 PROCEDURE — 96374 THER/PROPH/DIAG INJ IV PUSH: CPT

## 2018-01-18 PROCEDURE — 84484 ASSAY OF TROPONIN QUANT: CPT

## 2018-01-18 NOTE — ED PDOC
HPI: General Adult


Time Seen by Provider: 01/18/18 22:46


Chief Complaint (Nursing): Abdominal Pain


History Per: Patient


Additional Complaint(s): 





Pt. states approximately 4 hours ago he developed epigastric pain associated 

with 5 episodes of non-bloody vomiting and 5 episodes of non-bloody diarrhea. 

Reports that he has a hx of chronic pancreatitis and symptoms are similar. 

Further states that he had the same symptoms 1 week ago and was seen in this ED 

and had blood work and dc'd. Reports symptoms resolved up until 4 hours ago. 

Denies hematemesis, BRBPR, hematochezia, melena, fever, chest pain, SOB, 

palpitations. 





Past Medical History


Reviewed: Historical Data, Nursing Documentation, Vital Signs


Vital Signs: 


 Last Vital Signs











Temp  98.7 F   01/18/18 22:35


 


Pulse  79   01/19/18 01:24


 


Resp  18   01/18/18 22:35


 


BP  150/83   01/18/18 22:35


 


Pulse Ox  98   01/19/18 01:24














- Medical History


PMH: Anxiety, Depression, HTN, Pancreatitis (Recurrent), Chronic Pain (abdominal

)


   Denies: HIV, Chronic Kidney Disease





- Family History


Family History: States: No Known Family Hx





- Immunization History


Hx Tetanus Toxoid Vaccination: No


Hx Influenza Vaccination: Yes (2016)


Hx Pneumococcal Vaccination: Yes (2016)





- Home Medications


Home Medications: 


 Ambulatory Orders











 Medication  Instructions  Recorded


 


Alprazolam [Xanax] 0.5 mg PO TID 12/12/16


 


Paroxetine HCl [Paxil] 40 mg PO DAILY 12/12/16


 


oxyCODONE/Acetaminophen [Percocet 1 ea PO QID #10 tab 10/03/17





5/325 mg Tab]  


 


Chlorhexidine 0.12% [Peridex] 10 ml PO BID #1 bottle 10/08/17


 


Esomeprazole Magnesium [Nexium] 20 mg PO DAILY #30 capsule. 10/08/17


 


Dicyclomine [Dicyclomine HCl] 10 mg PO QID #30 cap 01/11/18


 


Ondansetron [Zofran] 4 mg PO Q8H #12 tab 01/11/18


 


Dicyclomine [Bentyl] 20 mg PO Q8 PRN #20 tab 01/19/18


 


Ondansetron ODT [Zofran ODT] 4 mg PO TID #21 odt 01/19/18














- Allergies


Allergies/Adverse Reactions: 


 Allergies











Allergy/AdvReac Type Severity Reaction Status Date / Time


 


No Known Allergies Allergy   Verified 01/18/18 22:35














Review of Systems


ROS Statement: Except As Marked, All Systems Reviewed And Found Negative


Gastrointestinal: Positive for: Nausea, Vomiting, Abdominal Pain, Diarrhea





Physical Exam





- Physical Exam


Appears: Positive for: Well, Non-toxic, No Acute Distress


Skin: Positive for: Normal Color, Warm.  Negative for: Rash


Eye Exam: Positive for: Normal appearance, EOMI, PERRL


ENT: Positive for: Normal ENT Inspection


Neck: Positive for: Normal, Painless ROM


Cardiovascular/Chest: Positive for: Regular Rate, Rhythm


Respiratory: Positive for: CNT, Normal Breath Sounds


Gastrointestinal/Abdominal: Positive for: Normal Exam, Bowel Sounds, Soft, 

Other (no ecchymosis).  Negative for: Tenderness, Distended


Back: Positive for: Normal Inspection.  Negative for: L CVA Tenderness, R CVA 

Tenderness


Extremity: Positive for: Normal ROM


Neurologic/Psych: Positive for: Alert, Oriented





- Laboratory Results


Result Diagrams: 


 01/18/18 23:49





 01/18/18 23:49





- ECG


ECG: Positive for: Interpreted By Me


ECG Rhythm: Positive for: Sinus Rhythm.  Negative for: ST/T Changes


Rate: 79


O2 Sat by Pulse Oximetry: 98





- Progress


ED Course And Treament: 





Labs ordered. EKG ordered. IV NS bolus, pepcid 20mg IV, morphine 4mg, zofran 

4mg IV ordered. 


Re-evaluation Time: 01:23 (Reports complete relief of pain. )


Condition: Re-examined, Improved





Disposition





- Clinical Impression


Clinical Impression: 


 Gastroenteritis








- Patient ED Disposition


Is Patient to be Admitted: No





- Disposition


Referrals: 


[facility] [Outside]


[facility] [Outside]


Disposition: Routine/Home


Disposition Time: 01:23


Condition: STABLE


Additional Instructions: 


Follow up with [facility] for further evaluation. 


Prescriptions: 


Dicyclomine [Bentyl] 20 mg PO Q8 PRN #20 tab


 PRN Reason: abdominal pain


Ondansetron ODT [Zofran ODT] 4 mg PO TID #21 odt


Instructions:  Gastroenteritis (ED)


Forms:  [facility] (English)

## 2018-01-19 LAB
ALBUMIN SERPL-MCNC: 4.3 G/DL (ref 3.5–5)
ALBUMIN/GLOB SERPL: 1.5 {RATIO} (ref 1–2.1)
ALT SERPL-CCNC: 31 U/L (ref 21–72)
AST SERPL-CCNC: 21 U/L (ref 17–59)
BUN SERPL-MCNC: 14 MG/DL (ref 9–20)
CALCIUM SERPL-MCNC: 9.5 MG/DL (ref 8.4–10.2)
GFR NON-AFRICAN AMERICAN: > 60
LIPASE SERPL-CCNC: 146 U/L (ref 23–300)

## 2018-01-19 NOTE — CARD
--------------- APPROVED REPORT --------------





EKG Measurement

Heart [id number]

DC 166P85

[id number]

[id number]

[id number]



<Conclusion>

Normal sinus rhythm

Right atrial enlargement

Minimal voltage criteria for LVH, may be normal variant

Borderline ECG

## 2018-01-25 ENCOUNTER — HOSPITAL ENCOUNTER (EMERGENCY)
Dept: HOSPITAL 31 - C.ER | Age: 63
Discharge: HOME | End: 2018-01-25
Payer: MEDICARE

## 2018-01-25 VITALS — BODY MASS INDEX: 18.1 KG/M2

## 2018-01-25 VITALS
OXYGEN SATURATION: 98 % | HEART RATE: 77 BPM | SYSTOLIC BLOOD PRESSURE: 128 MMHG | TEMPERATURE: 98 F | DIASTOLIC BLOOD PRESSURE: 77 MMHG | RESPIRATION RATE: 20 BRPM

## 2018-01-25 DIAGNOSIS — K85.90: ICD-10-CM

## 2018-01-25 DIAGNOSIS — N40.0: Primary | ICD-10-CM

## 2018-01-25 DIAGNOSIS — I10: ICD-10-CM

## 2018-01-25 LAB
ALBUMIN SERPL-MCNC: 4.5 G/DL (ref 3.5–5)
ALBUMIN/GLOB SERPL: 1.5 {RATIO} (ref 1–2.1)
ALT SERPL-CCNC: 25 U/L (ref 21–72)
AST SERPL-CCNC: 25 U/L (ref 17–59)
BACTERIA #/AREA URNS HPF: (no result) /[HPF]
BASOPHILS # BLD AUTO: 0.1 K/UL (ref 0–0.2)
BASOPHILS NFR BLD: 1.1 % (ref 0–2)
BILIRUB UR-MCNC: NEGATIVE MG/DL
BUN SERPL-MCNC: 18 MG/DL (ref 9–20)
CALCIUM SERPL-MCNC: 9.7 MG/DL (ref 8.6–10.4)
EOSINOPHIL # BLD AUTO: 0 K/UL (ref 0–0.7)
EOSINOPHIL NFR BLD: 0.5 % (ref 0–4)
ERYTHROCYTE [DISTWIDTH] IN BLOOD BY AUTOMATED COUNT: 13.5 % (ref 11.5–14.5)
GFR NON-AFRICAN AMERICAN: > 60
GLUCOSE UR STRIP-MCNC: NORMAL MG/DL
HGB BLD-MCNC: 15.1 G/DL (ref 12–18)
LEUKOCYTE ESTERASE UR-ACNC: (no result) LEU/UL
LIPASE: 475 U/L (ref 23–300)
LYMPHOCYTES # BLD AUTO: 1.5 K/UL (ref 1–4.3)
LYMPHOCYTES NFR BLD AUTO: 22.4 % (ref 20–40)
MCH RBC QN AUTO: 33.1 PG (ref 27–31)
MCHC RBC AUTO-ENTMCNC: 35.2 G/DL (ref 33–37)
MCV RBC AUTO: 93.9 FL (ref 80–94)
MONOCYTES # BLD: 0.4 K/UL (ref 0–0.8)
MONOCYTES NFR BLD: 6.1 % (ref 0–10)
NEUTROPHILS # BLD: 4.7 K/UL (ref 1.8–7)
NEUTROPHILS NFR BLD AUTO: 69.9 % (ref 50–75)
NRBC BLD AUTO-RTO: 0 % (ref 0–2)
PH UR STRIP: 5 [PH] (ref 5–8)
PLATELET # BLD: 259 K/UL (ref 130–400)
PMV BLD AUTO: 8.3 FL (ref 7.2–11.7)
PROT UR STRIP-MCNC: NEGATIVE MG/DL
RBC # BLD AUTO: 4.55 MIL/UL (ref 4.4–5.9)
RBC # UR STRIP: NEGATIVE /UL
SP GR UR STRIP: 1 (ref 1–1.03)
URINE NITRATE: NEGATIVE
UROBILINOGEN UR-MCNC: NORMAL MG/DL (ref 0.2–1)
WBC # BLD AUTO: 6.7 K/UL (ref 4.8–10.8)

## 2018-01-25 PROCEDURE — 96361 HYDRATE IV INFUSION ADD-ON: CPT

## 2018-01-25 PROCEDURE — 74176 CT ABD & PELVIS W/O CONTRAST: CPT

## 2018-01-25 PROCEDURE — 85025 COMPLETE CBC W/AUTO DIFF WBC: CPT

## 2018-01-25 PROCEDURE — 99285 EMERGENCY DEPT VISIT HI MDM: CPT

## 2018-01-25 PROCEDURE — 96375 TX/PRO/DX INJ NEW DRUG ADDON: CPT

## 2018-01-25 PROCEDURE — 96374 THER/PROPH/DIAG INJ IV PUSH: CPT

## 2018-01-25 PROCEDURE — 81001 URINALYSIS AUTO W/SCOPE: CPT

## 2018-01-25 PROCEDURE — 93005 ELECTROCARDIOGRAM TRACING: CPT

## 2018-01-25 PROCEDURE — 83690 ASSAY OF LIPASE: CPT

## 2018-01-25 PROCEDURE — 87086 URINE CULTURE/COLONY COUNT: CPT

## 2018-01-25 PROCEDURE — 80053 COMPREHEN METABOLIC PANEL: CPT

## 2018-01-25 PROCEDURE — 96376 TX/PRO/DX INJ SAME DRUG ADON: CPT

## 2018-01-25 NOTE — CT
EXAM:

  CT Abdomen and Pelvis Without Intravenous Contrast



EXAM DATE/TIME:

  1/25/2018 2:28 PM



CLINICAL HISTORY:

  62 years old, male; Pain; Abdominal pain; Epigastric; Additional info: Abd 

pain



TECHNIQUE:

  Axial computed tomography images of the abdomen and pelvis without 

intravenous contrast.  All CT scans at this facility use one or more dose 

reduction techniques, viz.: automated exposure control; ma/kV adjustment per 

patient size (including targeted exams where dose is matched to indication; 

i.e. head); or iterative reconstruction technique.

  Coronal and sagittal reformatted images were created and reviewed.



COMPARISON:

  Prior images are not available for review.



FINDINGS:

  Lower thorax:  Heart size is normal. There is atelectasis/scarring at the 

lung bases. There is a small hiatal hernia.



 ABDOMEN:

  Liver:  There is a small calcification in the liver.

  Gallbladder and bile ducts:  unremarkable

  Pancreas:  There are tiny punctate calcifications in the pancreas. There are 

no pancreatic masses. There is no peripancreatic inflammation.

  Spleen:  unremarkable

  Adrenals:  There are fatty nodules in both adrenals. Right adrenal nodule 

measures approximately 2 cm. Left adrenal nodule measures approximately 1.8 cm.

  Kidneys and ureters:  Kidneys are normal in size. There is contrast from a 

prior study in pelvicalyceal systems bilaterally.There is no pelvocaliectasis 

or ureterectasis.

  Stomach and bowel:  Stomach is almost completely empty. Rotation is normal. 

There is no obstruction. Ileocecal region is unremarkable. Appendix and 

terminal ileum are unremarkable. Colon is incompletely distended which limits 

evaluation. There is scattered diverticulosis

  Appendix:  See stomach and bowel



 PELVIS:

  Bladder:  Bladder is distended. There is mild bladder wall thickening There 

is a contrast urine level.

  Reproductive:  The prostate is enlarged.Seminal vesicles have the expected 

configuration.



 ABDOMEN and PELVIS:

  Intraperitoneal space:  There is no free air or free fluid.

  Bones/joints:  Bony structures are osteopenic. There are degenerative changes.

  Soft tissues:  There is a very small fat containing umbilical hernia

  Vasculature:  There are vascular calcifications.

  Lymph nodes:  There is no pathologic adenopathy.



IMPRESSION:  Contrast from a prior study in renal collecting systems and 

bladder, no hydronephrosis; small punctate pancreatic calcifications, similar 

findings described in prior report; small bilateral adrenal nodules possibly 

myolipomas; mild bladder wall thickening possibly due to enlarged prostate; no 

CT findings of appendicitis or diverticulitis





Additional nonemergent findings as described above.

## 2018-01-25 NOTE — CARD
--------------- APPROVED REPORT --------------





EKG Measurement

Heart [id number]

HI 136P49

[id number]

[id number]

[id number]



<Conclusion>

Normal sinus rhythm

Normal ECG

## 2018-01-25 NOTE — C.PDOC
History Of Present Illness


62 yr old male with PMhx of chronic pancreatitis, presents to the ER for 

evaluation of epigastric pain, radiating to suprapubic. Patient states the pain 

is consistent with previous episode of pancreatitis. Patient reports of 

multiple episodes of non bloody vomiting and diarrhea today. Patient denies 

fever, chills, chest pain, SOB, nausea, dysuria, hematuria, back pain, weakness 

or numbness. 


Time Seen by Provider: 18 14:14


Chief Complaint (Nursing): Chest Pain


History Per: Patient


History/Exam Limitations: no limitations


Onset/Duration Of Symptoms: Days


Current Symptoms Are (Timing): Still Present





Past Medical History


Reviewed: Historical Data, Nursing Documentation, Vital Signs


Vital Signs: 


 Last Vital Signs











Temp  98.1 F   18 19:48


 


Pulse  70   18 19:48


 


Resp  18   18 19:48


 


BP  131/81   18 19:48


 


Pulse Ox  97   18 20:35














- Medical History


PMH: Anxiety, Depression, HTN, Pancreatitis (Recurrent), Chronic Pain (abdominal

)





- [facility] Procedures








EXCISION OF DUODENUM, ENDO, DIAGN (17)


EXCISION OF STOMACH, ENDO, DIAGN (17)


INDIVID PSYCHOTHERAP NEC (13)


INJECT/INFUSE NEC (02/23/15)


INSERTION OF INFUSION DEV INTO SUP VENA CAVA, PERC APPROACH (17)


OTHER GROUP THERAPY (13)








Family History: States: No Known Family Hx





- Social History


Hx Alcohol Use: No


Hx Substance Use: No





- Immunization History


Hx Tetanus Toxoid Vaccination: Yes


Hx Influenza Vaccination: Yes


Hx Pneumococcal Vaccination: Yes





Review Of Systems


Except As Marked, All Systems Reviewed And Found Negative.


Constitutional: Negative for: Fever, Chills


Cardiovascular: Negative for: Chest Pain


Respiratory: Negative for: Shortness of Breath


Gastrointestinal: Positive for: Vomiting, Abdominal Pain (epigastric radiating 

to suprapubic), Diarrhea.  Negative for: Nausea


Genitourinary: Negative for: Dysuria, Hematuria


Musculoskeletal: Negative for: Back Pain


Neurological: Negative for: Weakness, Numbness





Physical Exam





- Physical Exam


Appears: Non-toxic, No Acute Distress


Skin: Warm, Dry, No Rash


Oral Mucosa: Moist


Lips: Normal Appearing


Throat: Normal, No Erythema, No Exudate


Neck: Normal, Normal ROM, Supple


Cardiovascular: Rhythm Regular, No Murmur


Respiratory: Normal Breath Sounds, No Rales, No Rhonchi, No Stridor, No Wheezing


Gastrointestinal/Abdominal: Soft, Tenderness (LLQ), No Guarding, No Rebound


Back: Normal Inspection, No CVA Tenderness


Extremity: Normal ROM, No Swelling


Neurological/Psych: Oriented x3, Normal Speech, Normal Motor, Normal Sensation





ED Course And Treatment





- Laboratory Results


Result Diagrams: 


 18 15:02





 18 15:02


ECG: Interpreted By Me, Viewed By Me


ECG Rhythm: Sinus Rhythm


Interpretation Of ECG: Normal intervals. No ST/T changes.


Rate From EC (BPM)


O2 Sat by Pulse Oximetry: 97 (RA)


Pulse Ox Interpretation: Normal





- CT Scan/US


  ** CT - Abd & Pelvis


Other Rad Studies (CT/US): Read By Radiologist, Radiology Report Reviewed





Medical Decision Making


Medical Decision Making: 


PLAN:


* CT - Abd & Pelvis


* EKG


* CBCCMP


* Morphine IVP


* Pepcid IVP


* Zofran IVP


* Sodium Chloride IV 





CT abd/pel results:





CT Scan  


 


 


ABD   PELVIS W/O PO OR IV CONT                          Exam Date: 18  


 


This imaging exam was performed at [facility]  


EXAM:  


   CT Abdomen and Pelvis Without Intravenous Contrast  


 


 EXAM DATE/TIME:  


   2018 2:28 PM  


 


 CLINICAL HISTORY:  


   62 years old, male; Pain; Abdominal pain; Epigastric; Additional info: Abd   


 pain  


 


 TECHNIQUE:  


   Axial computed tomography images of the abdomen and pelvis without   


 intravenous contrast.  All CT scans at this facility use one or more dose   


 reduction techniques, viz.: automated exposure control; ma/kV adjustment per   


 patient size (including targeted exams where dose is matched to indication;   


 i.e. head); or iterative reconstruction technique.  


   Coronal and sagittal reformatted images were created and reviewed.  


 


 COMPARISON:  


   Prior images are not available for review.  


 


 FINDINGS:  


   Lower thorax:  Heart size is normal. There is atelectasis/scarring at the   


 lung bases. There is a small hiatal hernia.  


 


  ABDOMEN:  


   Liver:  There is a small calcification in the liver.  


   Gallbladder and bile ducts:  unremarkable  


   Pancreas:  There are tiny punctate calcifications in the pancreas. There are

   


 no pancreatic masses. There is no peripancreatic inflammation.  


   Spleen:  unremarkable  


   Adrenals:  There are fatty nodules in both adrenals. Right adrenal nodule   


 measures approximately 2 cm. Left adrenal nodule measures approximately 1.8 

cm.  


   Kidneys and ureters:  Kidneys are normal in size. There is contrast from a   


 prior study in pelvicalyceal systems bilaterally.There is no pelvocaliectasis 

  


 or ureterectasis.  


   Stomach and bowel:  Stomach is almost completely empty. Rotation is normal. 

  


 There is no obstruction. Ileocecal region is unremarkable. Appendix and   


 terminal ileum are unremarkable. Colon is incompletely distended which limits 

  


 evaluation. There is scattered diverticulosis  


   Appendix:  See stomach and bowel  


 


  PELVIS:  


   Bladder:  Bladder is distended. There is mild bladder wall thickening There 

  


 is a contrast urine level.  


   Reproductive:  The prostate is enlarged.Seminal vesicles have the expected   


 configuration.  


 


  ABDOMEN and PELVIS:  


   Intraperitoneal space:  There is no free air or free fluid.  


   Bones/joints:  Bony structures are osteopenic. There are degenerative 

changes.  


   Soft tissues:  There is a very small fat containing umbilical hernia  


   Vasculature:  There are vascular calcifications.  


   Lymph nodes:  There is no pathologic adenopathy.  


 


 IMPRESSION:  Contrast from a prior study in renal collecting systems and   


 bladder, no hydronephrosis; small punctate pancreatic calcifications, similar 

  


 findings described in prior report; small bilateral adrenal nodules possibly   


 myolipomas; mild bladder wall thickening possibly due to enlarged prostate; no

   


 CT findings of appendicitis or diverticulitis 








8:43PM


Patient feels better.  He is tolerating po.  Will dc





Disposition





- Disposition


Referrals: 


Poncho Espana MD [Staff Provider] - 


Disposition: HOME/ ROUTINE


Disposition Time: 20:43


Condition: GOOD


Additional Instructions: 


Follow-up with PMD within 2 days.  Return to ED if condition worsens.  Follow-

up with urologist for enlarged prostate.


Instructions:  Pancreatitis (ED)


Forms:  [facility] (English)





- Clinical Impression


Clinical Impression: 


 Enlarged prostate, Pancreatitis








- Scribe Statement


The provider has reviewed the documentation as recorded by the Scribe


Disha العراقي


Provider Attestation: 


All medical record entries made by the Scribe were at my direction and 

personally dictated by me. I have reviewed the chart and agree that the record 

accurately reflects my personal performance of the history, physical exam, 

medical decision making, and the department course for this patient. I have 

also personally directed, reviewed, and agree with the discharge instructions 

and disposition.

## 2018-01-27 ENCOUNTER — HOSPITAL ENCOUNTER (INPATIENT)
Dept: HOSPITAL 31 - C.ER | Age: 63
LOS: 2 days | Discharge: LEFT BEFORE DISCHARGE | DRG: 440 | End: 2018-01-29
Payer: MEDICARE

## 2018-01-27 VITALS — BODY MASS INDEX: 18.1 KG/M2

## 2018-01-27 DIAGNOSIS — F17.210: ICD-10-CM

## 2018-01-27 DIAGNOSIS — K86.1: ICD-10-CM

## 2018-01-27 DIAGNOSIS — G89.29: ICD-10-CM

## 2018-01-27 DIAGNOSIS — I10: ICD-10-CM

## 2018-01-27 DIAGNOSIS — K85.90: Primary | ICD-10-CM

## 2018-01-27 LAB
ALBUMIN SERPL-MCNC: 4.5 G/DL (ref 3.5–5)
ALBUMIN/GLOB SERPL: 1.2 {RATIO} (ref 1–2.1)
ALT SERPL-CCNC: 26 U/L (ref 21–72)
AST SERPL-CCNC: 37 U/L (ref 17–59)
BASOPHILS # BLD AUTO: 0 K/UL (ref 0–0.2)
BASOPHILS NFR BLD: 0.5 % (ref 0–2)
BILIRUB UR-MCNC: NEGATIVE MG/DL
BUN SERPL-MCNC: 15 MG/DL (ref 9–20)
CALCIUM SERPL-MCNC: 9.4 MG/DL (ref 8.6–10.4)
EOSINOPHIL # BLD AUTO: 0 K/UL (ref 0–0.7)
EOSINOPHIL NFR BLD: 0.4 % (ref 0–4)
ERYTHROCYTE [DISTWIDTH] IN BLOOD BY AUTOMATED COUNT: 13.3 % (ref 11.5–14.5)
GFR NON-AFRICAN AMERICAN: > 60
GLUCOSE UR STRIP-MCNC: NORMAL MG/DL
HGB BLD-MCNC: 13.4 G/DL (ref 12–18)
LEUKOCYTE ESTERASE UR-ACNC: (no result) LEU/UL
LIPASE: 776 U/L (ref 23–300)
LYMPHOCYTES # BLD AUTO: 1.5 K/UL (ref 1–4.3)
LYMPHOCYTES NFR BLD AUTO: 15.7 % (ref 20–40)
MCH RBC QN AUTO: 32.2 PG (ref 27–31)
MCHC RBC AUTO-ENTMCNC: 33.8 G/DL (ref 33–37)
MCV RBC AUTO: 95.5 FL (ref 80–94)
MONOCYTES # BLD: 0.7 K/UL (ref 0–0.8)
MONOCYTES NFR BLD: 7.2 % (ref 0–10)
NEUTROPHILS # BLD: 7.1 K/UL (ref 1.8–7)
NEUTROPHILS NFR BLD AUTO: 76.2 % (ref 50–75)
NRBC BLD AUTO-RTO: 0 % (ref 0–2)
PH UR STRIP: 5 [PH] (ref 5–8)
PLATELET # BLD: 206 K/UL (ref 130–400)
PMV BLD AUTO: 7.8 FL (ref 7.2–11.7)
PROT UR STRIP-MCNC: NEGATIVE MG/DL
RBC # BLD AUTO: 4.16 MIL/UL (ref 4.4–5.9)
RBC # UR STRIP: NEGATIVE /UL
SP GR UR STRIP: 1.01 (ref 1–1.03)
URINE NITRATE: NEGATIVE
UROBILINOGEN UR-MCNC: NORMAL MG/DL (ref 0.2–1)
WBC # BLD AUTO: 9.3 K/UL (ref 4.8–10.8)

## 2018-01-27 NOTE — CP.CCUPN
CCU Objective





- Vital Signs / Intake & Output


Vital Signs (Last 4 hours): 


Vital Signs











  Temp Pulse Resp BP Pulse Ox


 


 01/27/18 21:25  97.6 F  61  18  159/71 H  98


 


 01/27/18 19:30      97


 


 01/27/18 18:47   89  20  137/83  97











Intake and Output (Last 8hrs): 


 Intake & Output











 01/27/18 01/27/18 01/27/18





 06:59 14:59 22:59


 


Weight   130 lb














- Medications


Active Medications: 


Active Medications











Generic Name Dose Route Start Last Admin





  Trade Name Freq  PRN Reason Stop Dose Admin


 


Sodium Chloride  1,000 mls @ 100 mls/hr  01/27/18 19:13  01/27/18 21:02





  Sodium Chloride 0.9%  IV  01/28/18 05:12  100 mls/hr





  .Q10H ONE   Administration














- Patient Studies


Lab Studies: 


 Lab Studies











  01/27/18 01/27/18 Range/Units





  21:01 20:26 


 


WBC  9.3   (4.8-10.8)  K/uL


 


RBC  4.16 L   (4.40-5.90)  Mil/uL


 


Hgb  13.4   (12.0-18.0)  g/dL


 


Hct  39.7   (35.0-51.0)  %


 


MCV  95.5 H   (80.0-94.0)  fL


 


MCH  32.2 H   (27.0-31.0)  pg


 


MCHC  33.8   (33.0-37.0)  g/dL


 


RDW  13.3   (11.5-14.5)  %


 


Plt Count  206   (130-400)  K/uL


 


MPV  7.8   (7.2-11.7)  fL


 


Neut % (Auto)  76.2 H   (50.0-75.0)  %


 


Lymph % (Auto)  15.7 L   (20.0-40.0)  %


 


Mono % (Auto)  7.2   (0.0-10.0)  %


 


Eos % (Auto)  0.4   (0.0-4.0)  %


 


Baso % (Auto)  0.5   (0.0-2.0)  %


 


Neut #  7.1 H   (1.8-7.0)  K/uL


 


Lymph #  1.5   (1.0-4.3)  K/uL


 


Mono #  0.7   (0.0-0.8)  K/uL


 


Eos #  0.0   (0.0-0.7)  K/uL


 


Baso #  0.0   (0.0-0.2)  K/uL


 


Sodium   135  (132-148)  mmol/L


 


Potassium   4.9  (3.6-5.2)  mmol/L


 


Chloride   101  ()  mmol/L


 


Carbon Dioxide   23  (22-30)  mmol/L


 


Anion Gap   15  (10-20)  


 


BUN   15  (9-20)  mg/dL


 


Creatinine   0.7 L  (0.8-1.5)  mg/dL


 


Est GFR (African Amer)   > 60  


 


Est GFR (Non-Af Amer)   > 60  


 


Random Glucose   89  ()  mg/dL


 


Calcium   9.4  (8.6-10.4)  mg/dl


 


Total Bilirubin   0.8  (0.2-1.3)  mg/dL


 


AST   37  (17-59)  U/L


 


ALT   26  (21-72)  U/L


 


Alkaline Phosphatase   69  ()  U/L


 


Total Protein   8.1  (6.3-8.3)  g/dL


 


Albumin   4.5  (3.5-5.0)  g/dL


 


Globulin   3.7  (2.2-3.9)  gm/dL


 


Albumin/Globulin Ratio   1.2  (1.0-2.1)  


 


Lipase   776 H  ()  U/L


 


Alcohol, Quantitative   < 10  (0-10)  mg/dl








 Laboratory Results - last 24 hr











  01/27/18 01/27/18





  20:26 21:01


 


WBC   9.3


 


RBC   4.16 L


 


Hgb   13.4


 


Hct   39.7


 


MCV   95.5 H


 


MCH   32.2 H


 


MCHC   33.8


 


RDW   13.3


 


Plt Count   206


 


MPV   7.8


 


Neut % (Auto)   76.2 H


 


Lymph % (Auto)   15.7 L


 


Mono % (Auto)   7.2


 


Eos % (Auto)   0.4


 


Baso % (Auto)   0.5


 


Neut #   7.1 H


 


Lymph #   1.5


 


Mono #   0.7


 


Eos #   0.0


 


Baso #   0.0


 


Sodium  135 


 


Potassium  4.9 


 


Chloride  101 


 


Carbon Dioxide  23 


 


Anion Gap  15 


 


BUN  15 


 


Creatinine  0.7 L 


 


Est GFR ( Amer)  > 60 


 


Est GFR (Non-Af Amer)  > 60 


 


Random Glucose  89 


 


Calcium  9.4 


 


Total Bilirubin  0.8 


 


AST  37 


 


ALT  26 


 


Alkaline Phosphatase  69 


 


Total Protein  8.1 


 


Albumin  4.5 


 


Globulin  3.7 


 


Albumin/Globulin Ratio  1.2 


 


Lipase  776 H 


 


Alcohol, Quantitative  < 10

## 2018-01-27 NOTE — C.PDOC
History Of Present Illness


62 year old male presents to the ER with a complaint of abdominal pain for the 

past few days that has worsened today, associated with diarrhea and some 

nausea. Patient has a Hx of pancreatitis and was seen recently for abdominal 

pain. Denies ETOH intake, fever, or chills.


Chief Complaint (Nursing): Abdominal Pain


History Per: Patient


History/Exam Limitations: no limitations


Onset/Duration Of Symptoms: Days


Current Symptoms Are (Timing): Still Present


Location Of Pain/Discomfort: Diffuse


Radiation Of Pain To:: None


Associated Symptoms: Nausea, Diarrhea.  denies: Fever, Chills


Exacerbating Factors: None


Alleviating Factors: None


Recent travel outside of the [place]: No





Past Medical History


Reviewed: Historical Data, Nursing Documentation, Vital Signs


Vital Signs: 


 Last Vital Signs











Temp  97.6 F   01/27/18 21:25


 


Pulse  61   01/27/18 21:25


 


Resp  18   01/27/18 21:25


 


BP  159/71 H  01/27/18 21:25


 


Pulse Ox  97   01/27/18 22:53














- Medical History


PMH: Anxiety, Depression, HTN, Pancreatitis (Recurrent), Chronic Pain (abdominal

)





- [facility] Procedures








EXCISION OF DUODENUM, ENDO, DIAGN (09/26/17)


EXCISION OF STOMACH, ENDO, DIAGN (09/26/17)


INDIVID PSYCHOTHERAP NEC (06/26/13)


INJECT/INFUSE NEC (02/23/15)


INSERTION OF INFUSION DEV INTO SUP VENA CAVA, PERC APPROACH (02/23/17)


OTHER GROUP THERAPY (06/26/13)








Family History: States: Unknown Family Hx





- Social History


Hx Alcohol Use: No


Hx Substance Use: No





- Immunization History


Hx Tetanus Toxoid Vaccination: Yes


Hx Influenza Vaccination: Yes


Hx Pneumococcal Vaccination: Yes





Review Of Systems


Constitutional: Negative for: Fever, Chills


Cardiovascular: Negative for: Chest Pain, Palpitations


Respiratory: Negative for: Shortness of Breath


Gastrointestinal: Positive for: Nausea, Abdominal Pain, Diarrhea





Physical Exam





- Physical Exam


Appears: Non-toxic, Other (Mild distress)


Skin: Normal Color, Warm, Dry


Head: Atraumatic, Normacephalic


Eye(s): bilateral: Normal Inspection


Oral Mucosa: Moist


Chest: Symmetrical, No Tenderness


Cardiovascular: Rhythm Regular


Respiratory: Normal Breath Sounds, No Rales, No Rhonchi, No Wheezing


Gastrointestinal/Abdominal: Soft, Tenderness (Nonspecific, generalized), No Mass

, No Guarding, No Rebound


Neurological/Psych: Oriented x3, Normal Speech, Other (No focal deficits)





ED Course And Treatment





- Laboratory Results


Result Diagrams: 


 01/27/18 21:01





 01/27/18 20:26


O2 Sat by Pulse Oximetry: 97 (Room air)


Pulse Ox Interpretation: Normal


Progress Note: CT abd/pel, blood work, and urinalysis ordered. Bentyl, toradol, 

zofran, and IV fluids administered.





Disposition


Discussed With : Ángel Wiseman


Doctor Will See Patient In The: Hospital


Counseled Patient/Family Regarding: Diagnosis





- Disposition


Disposition: HOSPITALIZED


Disposition Time: 22:50


Condition: STABLE


Forms:  [facility] (English)





- POA


Present On Arrival: None





- Clinical Impression


Clinical Impression: 


 Abdominal pain, Pancreatitis, acute








- Scribe Statement


The provider has reviewed the documentation as recorded by the Scribbekah Perkins





All medical record entries made by the Scribe were at my direction and 

personally dictated by me. I have reviewed the chart and agree that the record 

accurately reflects my personal performance of the history, physical exam, 

medical decision making, and the department course for this patient. I have 

also personally directed, reviewed, and agree with the discharge instructions 

and disposition.

## 2018-01-28 VITALS — RESPIRATION RATE: 20 BRPM

## 2018-01-28 LAB
ALBUMIN SERPL-MCNC: 3.8 G/DL (ref 3.5–5)
ALBUMIN/GLOB SERPL: 1.4 {RATIO} (ref 1–2.1)
ALT SERPL-CCNC: 27 U/L (ref 21–72)
AST SERPL-CCNC: 24 U/L (ref 17–59)
BASOPHILS # BLD AUTO: 0.1 K/UL (ref 0–0.2)
BASOPHILS NFR BLD: 1.1 % (ref 0–2)
BUN SERPL-MCNC: 15 MG/DL (ref 9–20)
CALCIUM SERPL-MCNC: 8 MG/DL (ref 8.6–10.4)
EOSINOPHIL # BLD AUTO: 0.1 K/UL (ref 0–0.7)
EOSINOPHIL NFR BLD: 0.7 % (ref 0–4)
ERYTHROCYTE [DISTWIDTH] IN BLOOD BY AUTOMATED COUNT: 13.6 % (ref 11.5–14.5)
GFR NON-AFRICAN AMERICAN: > 60
HDLC SERPL-MCNC: 35 MG/DL (ref 30–70)
HGB BLD-MCNC: 12.7 G/DL (ref 12–18)
LDLC SERPL-MCNC: 88 MG/DL (ref 0–129)
LIPASE: 3326 U/L (ref 23–300)
LYMPHOCYTES # BLD AUTO: 2 K/UL (ref 1–4.3)
LYMPHOCYTES NFR BLD AUTO: 24.9 % (ref 20–40)
MCH RBC QN AUTO: 32.1 PG (ref 27–31)
MCHC RBC AUTO-ENTMCNC: 33.8 G/DL (ref 33–37)
MCV RBC AUTO: 95 FL (ref 80–94)
MONOCYTES # BLD: 0.5 K/UL (ref 0–0.8)
MONOCYTES NFR BLD: 6.6 % (ref 0–10)
NEUTROPHILS # BLD: 5.3 K/UL (ref 1.8–7)
NEUTROPHILS NFR BLD AUTO: 66.7 % (ref 50–75)
NRBC BLD AUTO-RTO: 0 % (ref 0–2)
PLATELET # BLD: 212 K/UL (ref 130–400)
PMV BLD AUTO: 8.1 FL (ref 7.2–11.7)
RBC # BLD AUTO: 3.96 MIL/UL (ref 4.4–5.9)
WBC # BLD AUTO: 8 K/UL (ref 4.8–10.8)

## 2018-01-28 NOTE — CP.PCM.HP
Past Patient History





- Infectious Disease


Hx of Infectious Diseases: None





- Past Medical History & Family History


Past Medical History?: Yes





- Past Social History


Smoking Status: Light Smoker < 10 Cigarettes Daily





- CARDIAC


Hx Hypertension: Yes





- PULMONARY


Hx Respiratory Disorders: No





- NEUROLOGICAL


Hx Neurological Disorder: No





- HEENT


Hx HEENT Problems: No





- RENAL


Hx Chronic Kidney Disease: No





- ENDOCRINE/METABOLIC


Hx Endocrine Disorders: No





- HEMATOLOGICAL/ONCOLOGICAL


Hx Human Immunodeficiency Virus (HIV): No





- INTEGUMENTARY


Hx Dermatological Problems: No





- MUSCULOSKELETAL/RHEUMATOLOGICAL


Hx Falls: No





- GASTROINTESTINAL


Hx Pancreatitis: Yes (Recurrent)





- GENITOURINARY/GYNECOLOGICAL


Hx Genitourinary Disorders: No





- PSYCHIATRIC


Hx Anxiety: Yes


Hx Depression: Yes


Hx Substance Use: No





- SURGICAL HISTORY


Hx Surgeries: No





- ANESTHESIA


Hx Anesthesia: No


Hx Anesthesia Reactions: No


Hx Malignant Hyperthermia: No





Meds


Allergies/Adverse Reactions: 


 Allergies











Allergy/AdvReac Type Severity Reaction Status Date / Time


 


No Known Allergies Allergy   Verified 01/27/18 18:50














Results





- Vital Signs


Recent Vital Signs: 





 Last Vital Signs











Temp  97.9 F   01/28/18 16:00


 


Pulse  72   01/28/18 16:00


 


Resp  20   01/28/18 16:00


 


BP  101/64   01/28/18 16:00


 


Pulse Ox  95   01/28/18 16:00














- Labs


Result Diagrams: 


 01/28/18 08:16





 01/28/18 08:16


Labs: 





 Laboratory Results - last 24 hr











  01/27/18 01/28/18 01/28/18





  22:21 08:16 08:16


 


WBC   8.0 


 


RBC   3.96 L 


 


Hgb   12.7 


 


Hct   37.6 


 


MCV   95.0 H 


 


MCH   32.1 H 


 


MCHC   33.8 


 


RDW   13.6 


 


Plt Count   212 


 


MPV   8.1 


 


Neut % (Auto)   66.7 


 


Lymph % (Auto)   24.9 


 


Mono % (Auto)   6.6 


 


Eos % (Auto)   0.7 


 


Baso % (Auto)   1.1 


 


Neut #   5.3 


 


Lymph #   2.0 


 


Mono #   0.5 


 


Eos #   0.1 


 


Baso #   0.1 


 


Sodium    135


 


Potassium    4.2


 


Chloride    106


 


Carbon Dioxide    20 L


 


Anion Gap    13


 


BUN    15


 


Creatinine    0.9


 


Est GFR ( Amer)    > 60


 


Est GFR (Non-Af Amer)    > 60


 


Random Glucose    70 L


 


Calcium    8.0 L


 


Total Bilirubin    0.6


 


AST    24


 


ALT    27


 


Alkaline Phosphatase    73


 


Lactate Dehydrogenase    497


 


Total Protein    6.5


 


Albumin    3.8


 


Globulin    2.7


 


Albumin/Globulin Ratio    1.4


 


Triglycerides    54  D


 


Cholesterol    133


 


LDL Cholesterol Direct    88


 


HDL Cholesterol    35


 


Lipase    3326 H


 


Urine Opiates Screen  Positive H  


 


Urine Methadone Screen  Negative  


 


Ur Barbiturates Screen  Negative  


 


Ur Phencyclidine Scrn  Negative  


 


Ur Amphetamines Screen  Negative  


 


U Benzodiazepines Scrn  Positive  


 


U Oth Cocaine Metabols  Negative  


 


U Cannabinoids Screen  Negative

## 2018-01-29 VITALS
DIASTOLIC BLOOD PRESSURE: 66 MMHG | TEMPERATURE: 98.4 F | SYSTOLIC BLOOD PRESSURE: 112 MMHG | HEART RATE: 81 BPM | OXYGEN SATURATION: 97 %

## 2018-01-29 LAB
ALBUMIN SERPL-MCNC: 3.5 G/DL (ref 3.5–5)
ALBUMIN/GLOB SERPL: 1.4 {RATIO} (ref 1–2.1)
ALT SERPL-CCNC: 25 U/L (ref 21–72)
AST SERPL-CCNC: 25 U/L (ref 17–59)
BASOPHILS # BLD AUTO: 0 K/UL (ref 0–0.2)
BASOPHILS NFR BLD: 0.5 % (ref 0–2)
BUN SERPL-MCNC: 10 MG/DL (ref 9–20)
CALCIUM SERPL-MCNC: 8.3 MG/DL (ref 8.6–10.4)
EOSINOPHIL # BLD AUTO: 0 K/UL (ref 0–0.7)
EOSINOPHIL NFR BLD: 0.5 % (ref 0–4)
ERYTHROCYTE [DISTWIDTH] IN BLOOD BY AUTOMATED COUNT: 13.3 % (ref 11.5–14.5)
GFR NON-AFRICAN AMERICAN: > 60
HGB BLD-MCNC: 11.3 G/DL (ref 12–18)
LYMPHOCYTES # BLD AUTO: 1.2 K/UL (ref 1–4.3)
LYMPHOCYTES NFR BLD AUTO: 12.8 % (ref 20–40)
MCH RBC QN AUTO: 32.1 PG (ref 27–31)
MCHC RBC AUTO-ENTMCNC: 33.8 G/DL (ref 33–37)
MCV RBC AUTO: 95.2 FL (ref 80–94)
MONOCYTES # BLD: 0.7 K/UL (ref 0–0.8)
MONOCYTES NFR BLD: 6.9 % (ref 0–10)
NEUTROPHILS # BLD: 7.5 K/UL (ref 1.8–7)
NEUTROPHILS NFR BLD AUTO: 79.3 % (ref 50–75)
NRBC BLD AUTO-RTO: 0.1 % (ref 0–2)
PLATELET # BLD: 184 K/UL (ref 130–400)
PMV BLD AUTO: 8.2 FL (ref 7.2–11.7)
RBC # BLD AUTO: 3.53 MIL/UL (ref 4.4–5.9)
WBC # BLD AUTO: 9.4 K/UL (ref 4.8–10.8)
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
## 2018-01-30 NOTE — CP.PCM.DIS
Provider





- Provider


Date of Admission: 


01/27/18 22:55





Attending physician: 


Ángel Wiseman MD





Time Spent in preparation of Discharge (in minutes): 20





Hospital Course





- Lab Results


Lab Results: 


 Most Recent Lab Values











WBC  9.4 K/uL (4.8-10.8)   01/29/18  13:57    


 


RBC  3.53 Mil/uL (4.40-5.90)  L  01/29/18  13:57    


 


Hgb  11.3 g/dL (12.0-18.0)  L  01/29/18  13:57    


 


Hct  33.6 % (35.0-51.0)  L  01/29/18  13:57    


 


MCV  95.2 fL (80.0-94.0)  H  01/29/18  13:57    


 


MCH  32.1 pg (27.0-31.0)  H  01/29/18  13:57    


 


MCHC  33.8 g/dL (33.0-37.0)   01/29/18  13:57    


 


RDW  13.3 % (11.5-14.5)   01/29/18  13:57    


 


Plt Count  184 K/uL (130-400)   01/29/18  13:57    


 


MPV  8.2 fL (7.2-11.7)   01/29/18  13:57    


 


Neut % (Auto)  79.3 % (50.0-75.0)  H  01/29/18  13:57    


 


Lymph % (Auto)  12.8 % (20.0-40.0)  L  01/29/18  13:57    


 


Mono % (Auto)  6.9 % (0.0-10.0)   01/29/18  13:57    


 


Eos % (Auto)  0.5 % (0.0-4.0)   01/29/18  13:57    


 


Baso % (Auto)  0.5 % (0.0-2.0)   01/29/18  13:57    


 


Neut #  7.5 K/uL (1.8-7.0)  H  01/29/18  13:57    


 


Lymph #  1.2 K/uL (1.0-4.3)   01/29/18  13:57    


 


Mono #  0.7 K/uL (0.0-0.8)   01/29/18  13:57    


 


Eos #  0.0 K/uL (0.0-0.7)   01/29/18  13:57    


 


Baso #  0.0 K/uL (0.0-0.2)   01/29/18  13:57    


 


Sodium  130 mmol/L (132-148)  L  01/29/18  13:57    


 


Potassium  4.2 mmol/L (3.6-5.2)   01/29/18  13:57    


 


Chloride  101 mmol/L ()   01/29/18  13:57    


 


Carbon Dioxide  25 mmol/L (22-30)   01/29/18  13:57    


 


Anion Gap  8  (10-20)  L  01/29/18  13:57    


 


BUN  10 mg/dL (9-20)   01/29/18  13:57    


 


Creatinine  0.7 mg/dL (0.8-1.5)  L  01/29/18  13:57    


 


Est GFR ( Amer)  > 60   01/29/18  13:57    


 


Est GFR (Non-Af Amer)  > 60   01/29/18  13:57    


 


Random Glucose  80 mg/dL ()   01/29/18  13:57    


 


Calcium  8.3 mg/dl (8.6-10.4)  L  01/29/18  13:57    


 


Total Bilirubin  0.6 mg/dL (0.2-1.3)   01/29/18  13:57    


 


AST  25 U/L (17-59)   01/29/18  13:57    


 


ALT  25 U/L (21-72)   01/29/18  13:57    


 


Alkaline Phosphatase  58 U/L ()   01/29/18  13:57    


 


Lactate Dehydrogenase  497 U/L (313-618)   01/28/18  08:16    


 


Total Protein  5.9 g/dL (6.3-8.3)  L  01/29/18  13:57    


 


Albumin  3.5 g/dL (3.5-5.0)   01/29/18  13:57    


 


Globulin  2.4 gm/dL (2.2-3.9)   01/29/18  13:57    


 


Albumin/Globulin Ratio  1.4  (1.0-2.1)   01/29/18  13:57    


 


Triglycerides  54 mg/dL (0-149)  D 01/28/18  08:16    


 


Cholesterol  133 mg/dL (0-199)   01/28/18  08:16    


 


LDL Cholesterol Direct  88 mg/dL (0-129)   01/28/18  08:16    


 


HDL Cholesterol  35 mg/dL (30-70)   01/28/18  08:16    


 


Lipase  172 U/L ()   01/29/18  11:11    


 


Urine Color  Straw  (YELLOW)   01/27/18  22:21    


 


Urine Clarity  Clear  (Clear)   01/27/18  22:21    


 


Urine pH  5.0  (5.0-8.0)   01/27/18  22:21    


 


Ur Specific Gravity  1.008  (1.003-1.030)   01/27/18  22:21    


 


Urine Protein  Negative mg/dL (NEGATIVE)   01/27/18  22:21    


 


Urine Glucose (UA)  Normal mg/dL (Normal)   01/27/18  22:21    


 


Urine Ketones  Negative mg/dL (NEGATIVE)   01/27/18  22:21    


 


Urine Blood  Negative  (NEGATIVE)   01/27/18  22:21    


 


Urine Nitrate  Negative  (NEGATIVE)   01/27/18  22:21    


 


Urine Bilirubin  Negative  (NEGATIVE)   01/27/18  22:21    


 


Urine Urobilinogen  Normal mg/dL (0.2-1.0)   01/27/18  22:21    


 


Ur Leukocyte Esterase  Neg Zoe/uL (Negative)   01/27/18  22:21    


 


Urine WBC (Auto)  < 1 /hpf (0-5)   01/27/18  22:21    


 


Urine RBC (Auto)  < 1 /hpf (0-3)   01/27/18  22:21    


 


Urine Opiates Screen  Positive  (NEGATIVE)  H  01/27/18  22:21    


 


Urine Methadone Screen  Negative  (NEGATIVE)   01/27/18  22:21    


 


Ur Barbiturates Screen  Negative  (NEGATIVE)   01/27/18  22:21    


 


Ur Phencyclidine Scrn  Negative  (NEGATIVE)   01/27/18  22:21    


 


Ur Amphetamines Screen  Negative  (NEGATIVE)   01/27/18  22:21    


 


U Benzodiazepines Scrn  Positive  (NEGATIVE)   01/27/18  22:21    


 


U Oth Cocaine Metabols  Negative  (NEGATIVE)   01/27/18  22:21    


 


U Cannabinoids Screen  Negative  (NEGATIVE)   01/27/18  22:21    


 


Alcohol, Quantitative  < 10 mg/dl (0-10)   01/27/18  20:26    














Discharge Plan





- Follow Up Plan


Condition: STABLE


Disposition: AGAINST MEDICAL ADVICE

## 2018-02-08 ENCOUNTER — HOSPITAL ENCOUNTER (EMERGENCY)
Dept: HOSPITAL 31 - C.ER | Age: 63
Discharge: HOME | End: 2018-02-08
Payer: MEDICARE

## 2018-02-08 VITALS — BODY MASS INDEX: 18.1 KG/M2

## 2018-02-08 VITALS
OXYGEN SATURATION: 100 % | TEMPERATURE: 97.8 F | DIASTOLIC BLOOD PRESSURE: 77 MMHG | SYSTOLIC BLOOD PRESSURE: 163 MMHG | RESPIRATION RATE: 20 BRPM

## 2018-02-08 VITALS — HEART RATE: 80 BPM

## 2018-02-08 DIAGNOSIS — R10.13: Primary | ICD-10-CM

## 2018-02-08 LAB
ALBUMIN SERPL-MCNC: 4.4 G/DL (ref 3.5–5)
ALBUMIN/GLOB SERPL: 1.3 {RATIO} (ref 1–2.1)
ALT SERPL-CCNC: 33 U/L (ref 21–72)
AST SERPL-CCNC: 26 U/L (ref 17–59)
BASOPHILS # BLD AUTO: 0.1 K/UL (ref 0–0.2)
BASOPHILS NFR BLD: 1 % (ref 0–2)
BUN SERPL-MCNC: 14 MG/DL (ref 9–20)
CALCIUM SERPL-MCNC: 9.5 MG/DL (ref 8.6–10.4)
EOSINOPHIL # BLD AUTO: 0.1 K/UL (ref 0–0.7)
EOSINOPHIL NFR BLD: 0.6 % (ref 0–4)
ERYTHROCYTE [DISTWIDTH] IN BLOOD BY AUTOMATED COUNT: 13.6 % (ref 11.5–14.5)
GFR NON-AFRICAN AMERICAN: > 60
HGB BLD-MCNC: 14 G/DL (ref 12–18)
LIPASE: 190 U/L (ref 23–300)
LYMPHOCYTES # BLD AUTO: 1.8 K/UL (ref 1–4.3)
LYMPHOCYTES NFR BLD AUTO: 16.6 % (ref 20–40)
MCH RBC QN AUTO: 32.5 PG (ref 27–31)
MCHC RBC AUTO-ENTMCNC: 34.5 G/DL (ref 33–37)
MCV RBC AUTO: 94.3 FL (ref 80–94)
MONOCYTES # BLD: 0.5 K/UL (ref 0–0.8)
MONOCYTES NFR BLD: 4.4 % (ref 0–10)
NEUTROPHILS # BLD: 8.5 K/UL (ref 1.8–7)
NEUTROPHILS NFR BLD AUTO: 77.4 % (ref 50–75)
NRBC BLD AUTO-RTO: 0.1 % (ref 0–2)
PLATELET # BLD: 360 K/UL (ref 130–400)
PMV BLD AUTO: 7.5 FL (ref 7.2–11.7)
RBC # BLD AUTO: 4.29 MIL/UL (ref 4.4–5.9)
WBC # BLD AUTO: 10.9 K/UL (ref 4.8–10.8)

## 2018-02-08 PROCEDURE — 71045 X-RAY EXAM CHEST 1 VIEW: CPT

## 2018-02-08 PROCEDURE — 99284 EMERGENCY DEPT VISIT MOD MDM: CPT

## 2018-02-08 PROCEDURE — 96374 THER/PROPH/DIAG INJ IV PUSH: CPT

## 2018-02-08 PROCEDURE — 85025 COMPLETE CBC W/AUTO DIFF WBC: CPT

## 2018-02-08 PROCEDURE — 83690 ASSAY OF LIPASE: CPT

## 2018-02-08 PROCEDURE — 80053 COMPREHEN METABOLIC PANEL: CPT

## 2018-02-08 PROCEDURE — 84484 ASSAY OF TROPONIN QUANT: CPT

## 2018-02-08 PROCEDURE — 96375 TX/PRO/DX INJ NEW DRUG ADDON: CPT

## 2018-02-08 NOTE — RAD
HISTORY:

chest pain  



COMPARISON:

10/3/2017 



FINDINGS:



LUNGS:

No active pulmonary disease. Bilateral hyperaeration -similar 

-background COPD inferred 



PLEURA:

No significant pleural effusion identified, no pneumothorax apparent.



CARDIOVASCULAR:

Normal.



OSSEOUS STRUCTURES:

No significant abnormalities.



VISUALIZED UPPER ABDOMEN:

Normal.



OTHER FINDINGS:

None.



IMPRESSION:

No active disease.

## 2018-02-08 NOTE — C.PDOC
History Of Present Illness


61 y/o male presents to the ED for evaluation of epigsatric pain which began 2 

days ago. Patient reports history of pancreatitis and states his current 

symptoms are similar to prior. He denies fever, chills, nausea, vomiting, chest 

pan, shortness of breath, bloody stool/urine. 


Chief Complaint (Nursing): Chest Pain


History Per: Patient


History/Exam Limitations: no limitations


Onset/Duration Of Symptoms: Days (2)


Current Symptoms Are (Timing): Still Present


Quality: "Pain"


Additional History Per: Patient





Past Medical History


Reviewed: Historical Data, Nursing Documentation, Vital Signs


Vital Signs: 


 Last Vital Signs











Temp  97.8 F   02/08/18 14:39


 


Pulse  80   02/08/18 15:25


 


Resp  20   02/08/18 18:33


 


BP  163/77 H  02/08/18 14:39


 


Pulse Ox  100   02/08/18 23:06














- Medical History


PMH: Anxiety, Depression, HTN, Pancreatitis (Recurrent), Chronic Pain (abdominal

)


   Denies: HIV, Chronic Kidney Disease


Surgical History: No Surg Hx





- [facility] Procedures








EXCISION OF DUODENUM, ENDO, DIAGN (09/26/17)


EXCISION OF STOMACH, ENDO, DIAGN (09/26/17)


INDIVID PSYCHOTHERAP NEC (06/26/13)


INJECT/INFUSE NEC (02/23/15)


INSERTION OF INFUSION DEV INTO L BASILIC VEIN, PERC APPROACH (01/27/18)


INSERTION OF INFUSION DEV INTO SUP VENA CAVA, PERC APPROACH (02/23/17)


OTHER GROUP THERAPY (06/26/13)








Family History: States: Unknown Family Hx





- Social History


Hx Alcohol Use: No


Hx Substance Use: No





- Immunization History


Hx Tetanus Toxoid Vaccination: Yes


Hx Influenza Vaccination: Yes


Hx Pneumococcal Vaccination: Yes





Review Of Systems


Constitutional: Negative for: Fever, Chills


Cardiovascular: Negative for: Chest Pain


Respiratory: Negative for: Shortness of Breath


Gastrointestinal: Positive for: Abdominal Pain (epigastric ).  Negative for: 

Nausea, Vomiting, Hematochezia


Genitourinary: Negative for: Dysuria





Physical Exam





- Physical Exam


Appears: Non-toxic, No Acute Distress


Skin: Normal Color, Warm, Dry


Head: Atraumatic, Normacephalic


Eye(s): bilateral: Normal Inspection


Oral Mucosa: Moist


Neck: Supple


Chest: Symmetrical, No Deformity, No Tenderness


Cardiovascular: Rhythm Regular, No Murmur


Respiratory: Normal Breath Sounds, No Rales, No Rhonchi, No Wheezing


Gastrointestinal/Abdominal: Soft, Tenderness (epigastric ), No Guarding, No 

Rebound


Extremity: Normal ROM, Capillary Refill (less than 2 seconds )


Neurological/Psych: Oriented x3, Normal Speech, Normal Cognition


Gait: Steady





ED Course And Treatment





- Laboratory Results


Result Diagrams: 


 02/08/18 15:52





 02/08/18 15:52


O2 Sat by Pulse Oximetry: 100 (on RA)


Pulse Ox Interpretation: Normal





Medical Decision Making


Medical Decision Making: 


Progres: 


Bloodwork, urinalysis, EKG, CXR ordered and reviewed. 


Pepcid IVP, Tylenol/Codeine PO, Zofran IVP, and IV Fluids administered. 





Disposition





- Disposition


Referrals: 


Deya Christianson, [Non-Staff] - 


Disposition: HOME/ ROUTINE


Disposition Time: 16:30


Condition: IMPROVED


Additional Instructions: 





Thank you for letting us take care of you today. The emergency medical care you 

received today was directed at your acute symptoms. If you were prescribed any 

medication, please fill it and take as directed. It may take several days for 

your symptoms to resolve. Return to the Emergency Department if your symptoms 

worsen, do not improve, or if you have any other problems.





Please contact your doctor or call one of the physicians/clinics you have been 

referred to that are listed on the Patient Visit Information form that is 

included in your discharge packet. Bring any paperwork you were given at 

discharge with you along with any medications you are taking to your follow up 

visit. Our treatment cannot replace ongoing medical care by a primary care 

provider (PCP) outside of the emergency department.





Thank you for allowing the [facility] team to be part of your care today.

















Please follow up with your doctor in 2-3 days for re-evaluation and further 

management.


Prescriptions: 


Acetaminophen with Codeine [Tylenol with Codeine #3 Tablet] 1 each PO Q6 PRN #1 

tablet


 PRN Reason: Pain, Moderate (4-7)


Esomeprazole Magnesium [Nexium 24Hr] 20 mg PO DAILY #14 capsule.


Ranitidine HCl [Zantac] 150 mg PO BID #20 tablet


Instructions:  Gastritis (ED)


Forms:  [facility] (English)





- Clinical Impression


Clinical Impression: 


 Abdominal pain








- Scribe Statement


The provider has reviewed the documentation as recorded by the Scribe (Mariya 

López)


Provider Attestation: 








All medical record entries made by the Scribe were at my direction and 

personally dictated by me. I have reviewed the chart and agree that the record 

accurately reflects my personal performance of the history, physical exam, 

medical decision making, and the department course for this patient. I have 

also personally directed, reviewed, and agree with the discharge instructions 

and disposition.

## 2018-02-10 NOTE — CARD
--------------- APPROVED REPORT --------------





EKG Measurement

Heart [id number]

MS [id number]

[id number]

[id number]

[id number]



<Conclusion>

Normal sinus rhythm

Right atrial enlargement

Minimal voltage criteria for LVH, may be normal variant

Borderline ECG

## 2018-03-05 ENCOUNTER — HOSPITAL ENCOUNTER (EMERGENCY)
Dept: HOSPITAL 14 - H.ER | Age: 63
Discharge: HOME | End: 2018-03-05
Payer: COMMERCIAL

## 2018-03-05 VITALS
SYSTOLIC BLOOD PRESSURE: 141 MMHG | HEART RATE: 77 BPM | DIASTOLIC BLOOD PRESSURE: 95 MMHG | OXYGEN SATURATION: 98 % | TEMPERATURE: 98.2 F | RESPIRATION RATE: 18 BRPM

## 2018-03-05 VITALS — BODY MASS INDEX: 18.1 KG/M2

## 2018-03-05 DIAGNOSIS — F17.210: ICD-10-CM

## 2018-03-05 DIAGNOSIS — K52.9: Primary | ICD-10-CM

## 2018-03-05 DIAGNOSIS — I10: ICD-10-CM

## 2018-03-05 LAB
ALBUMIN SERPL-MCNC: 4.5 G/DL (ref 3.5–5)
ALBUMIN/GLOB SERPL: 1.4 {RATIO} (ref 1–2.1)
ALT SERPL-CCNC: 28 U/L (ref 21–72)
AST SERPL-CCNC: 28 U/L (ref 17–59)
BASOPHILS # BLD AUTO: 0.1 K/UL (ref 0–0.2)
BASOPHILS NFR BLD: 0.8 % (ref 0–2)
BILIRUB UR-MCNC: NEGATIVE MG/DL
BUN SERPL-MCNC: 14 MG/DL (ref 9–20)
CALCIUM SERPL-MCNC: 10.3 MG/DL (ref 8.4–10.2)
COLOR UR: YELLOW
EOSINOPHIL # BLD AUTO: 0.1 K/UL (ref 0–0.7)
EOSINOPHIL NFR BLD: 0.7 % (ref 0–4)
ERYTHROCYTE [DISTWIDTH] IN BLOOD BY AUTOMATED COUNT: 13.6 % (ref 11.5–14.5)
GFR NON-AFRICAN AMERICAN: > 60
GLUCOSE UR STRIP-MCNC: (no result) MG/DL
HGB BLD-MCNC: 14 G/DL (ref 12–18)
LEUKOCYTE ESTERASE UR-ACNC: (no result) LEU/UL
LIPASE SERPL-CCNC: 109 U/L (ref 23–300)
LYMPHOCYTES # BLD AUTO: 1.9 K/UL (ref 1–4.3)
LYMPHOCYTES NFR BLD AUTO: 25.7 % (ref 20–40)
MCH RBC QN AUTO: 31.5 PG (ref 27–31)
MCHC RBC AUTO-ENTMCNC: 33.4 G/DL (ref 33–37)
MCV RBC AUTO: 94.4 FL (ref 80–94)
MONOCYTES # BLD: 0.5 K/UL (ref 0–0.8)
MONOCYTES NFR BLD: 6.4 % (ref 0–10)
NEUTROPHILS # BLD: 5 K/UL (ref 1.8–7)
NEUTROPHILS NFR BLD AUTO: 66.4 % (ref 50–75)
NRBC BLD AUTO-RTO: 0 % (ref 0–0)
PH UR STRIP: 7 [PH] (ref 5–8)
PLATELET # BLD: 243 K/UL (ref 130–400)
PMV BLD AUTO: 7.9 FL (ref 7.2–11.7)
PROT UR STRIP-MCNC: NEGATIVE MG/DL
RBC # BLD AUTO: 4.45 MIL/UL (ref 4.4–5.9)
RBC # UR STRIP: NEGATIVE /UL
SP GR UR STRIP: 1.01 (ref 1–1.03)
URINE CLARITY: (no result)
URINE NITRATE: NEGATIVE
UROBILINOGEN UR-MCNC: (no result) MG/DL (ref 0.2–1)
WBC # BLD AUTO: 7.5 K/UL (ref 4.8–10.8)

## 2018-03-05 PROCEDURE — 96375 TX/PRO/DX INJ NEW DRUG ADDON: CPT

## 2018-03-05 PROCEDURE — 85025 COMPLETE CBC W/AUTO DIFF WBC: CPT

## 2018-03-05 PROCEDURE — 83605 ASSAY OF LACTIC ACID: CPT

## 2018-03-05 PROCEDURE — 83690 ASSAY OF LIPASE: CPT

## 2018-03-05 PROCEDURE — 80053 COMPREHEN METABOLIC PANEL: CPT

## 2018-03-05 PROCEDURE — 96361 HYDRATE IV INFUSION ADD-ON: CPT

## 2018-03-05 PROCEDURE — 99283 EMERGENCY DEPT VISIT LOW MDM: CPT

## 2018-03-05 PROCEDURE — 96374 THER/PROPH/DIAG INJ IV PUSH: CPT

## 2018-03-05 PROCEDURE — 96372 THER/PROPH/DIAG INJ SC/IM: CPT

## 2018-03-05 PROCEDURE — 81003 URINALYSIS AUTO W/O SCOPE: CPT

## 2018-03-05 NOTE — ED PDOC
HPI: Abdomen


Time Seen by Provider: 03/05/18 19:10


Chief Complaint (Nursing): Abdominal Pain


Chief Complaint (Provider): nausea, vomiting, and diarrhea 


History Per: Patient


History/Exam Limitations: no limitations


Onset/Duration Of Symptoms: Days (1x)


Additional Complaint(s): 


62 year old male with a past medical history of pancreatitis, depression, and 

anxiety presents to the ED complaining of nausea, vomiting, and diarrhea onset 

one day ago. States he has had four episodes of non-bilious and non-bloody 

vomiting and four episodes of watery diarrhea. Has not drank in year. At home 

he takes Paxil and Xanax. Denies intake of alcohol or drugs.





PMD: Non [facility] Provider








Past Medical History


Reviewed: Historical Data, Nursing Documentation, Vital Signs


Vital Signs: 


 Last Vital Signs











Temp  98.2 F   03/05/18 18:29


 


Pulse  77   03/05/18 18:29


 


Resp  18   03/05/18 18:29


 


BP  141/95 H  03/05/18 18:29


 


Pulse Ox  98   03/05/18 21:17














- Medical History


PMH: Anxiety, Depression, HTN, Pancreatitis (Recurrent), Chronic Pain (abdominal

)


   Denies: HIV, Chronic Kidney Disease





- Surgical History


Surgical History: No Surg Hx





- Family History


Family History: States: Unknown Family Hx





- Immunization History


Hx Tetanus Toxoid Vaccination: Yes


Hx Influenza Vaccination: Yes


Hx Pneumococcal Vaccination: Yes





- Home Medications


Home Medications: 


 Ambulatory Orders











 Medication  Instructions  Recorded


 


Alprazolam [Xanax] 0.5 mg PO TID 12/12/16


 


Paroxetine HCl [Paxil] 40 mg PO DAILY 12/12/16


 


Acetaminophen with Codeine 1 each PO Q6 PRN #1 tablet 02/08/18





[Tylenol with Codeine #3 Tablet]  


 


Esomeprazole Magnesium [Nexium 20 mg PO DAILY #14 capsule. 02/08/18





24Hr]  


 


Ranitidine HCl [Zantac] 150 mg PO BID #20 tablet 02/08/18














- Allergies


Allergies/Adverse Reactions: 


 Allergies











Allergy/AdvReac Type Severity Reaction Status Date / Time


 


No Known Allergies Allergy   Verified 02/08/18 14:42














Review of Systems


ROS Statement: Except As Marked, All Systems Reviewed And Found Negative


Gastrointestinal: Positive for: Nausea, Vomiting (four episodes of non-bilious 

and non-bloody), Diarrhea (four episodes of watery )





Physical Exam





- Reviewed


Nursing Documentation Reviewed: Yes


Vital Signs Reviewed: Yes





- Physical Exam


Appears: Positive for: Well, Non-toxic, No Acute Distress


Head Exam: Positive for: ATRAUMATIC, NORMAL INSPECTION, NORMOCEPHALIC


Skin: Positive for: Normal Color, Warm, Dry


Eye Exam: Positive for: EOMI, Normal appearance, PERRL


ENT: Positive for: Normal ENT Inspection


Neck: Positive for: Normal, Painless ROM, Supple.  Negative for: Decreased ROM, 

Limited ROM


Cardiovascular/Chest: Positive for: Regular Rate, Rhythm.  Negative for: Murmur


Respiratory: Positive for: Normal Breath Sounds.  Negative for: Decreased 

Breath Sounds, Accessory Muscle Use, Wheezing


Gastrointestinal/Abdominal: Positive for: Soft, Tenderness (mild epigastric).  

Negative for: Guarding, Rebound


Back: Positive for: Normal Inspection.  Negative for: L CVA Tenderness, R CVA 

Tenderness


Extremity: Positive for: Normal ROM.  Negative for: Tenderness, Pedal Edema, 

Deformity


Neurologic/Psych: Positive for: Alert, Oriented (x3)





- Laboratory Results


Result Diagrams: 


 03/05/18 19:19





 03/05/18 19:19





- ECG


O2 Sat by Pulse Oximetry: 98 (RA)


Pulse Ox Interpretation: Normal





Medical Decision Making


Medical Decision Making: 





Time: 19:11


Initial Impression: Gastroenteritis versus Pancreatitis 


Initial Plan:


--CMP


--Drug Screen


--Lactic Acid


--CBC


--Morphine 4mg


--Normal Saline 1,000 mls/hr


--Zofran Inj 4mg


--Urinalysis 


--Reevaluation 





2117





Patient feeling better, no longer vomiting, tolerating PO.  Labs, vitals wnl.  


Clinical Impression: Gastroenteritis 





Upon provider evaluation patient is medically stable, and requires no further 

treatment in the ED at this time. Patient will be discharged. Counseling was 

provided and all questions were answered regarding diagnosis and need for 

follow up with PMD. There is agreement to discharge plan. Return if symptoms 

persist or worsen.


--------------------------------------------------------------------------------

-----------------


Documented by Carlos Tolbert acting as a scribe for Ilia Contreras MD. 





All medical record entries made by the Scribe were at my direction and 

personally dictated by me. I have reviewed the chart and agree that the record 

accurately reflects my personal performance of the history, physical exam, 

medical decision making, and the department course for this patient. I have 

also personally directed, reviewed, and agree with the discharge instructions 

and disposition.














Disposition





- Clinical Impression


Clinical Impression: 


 Gastroenteritis








- Patient ED Disposition


Is Patient to be Admitted: No





- Disposition


Referrals: 


Sarika Barton MD [Medical Doctor] - 


Disposition: Routine/Home


Disposition Time: 21:17


Condition: IMPROVED


Additional Instructions: 


YOU MUST FOLLOWUP WITH GASTROENTEROLOGY IN 1 - 2 DAYS.


Instructions:  Viral Gastroenteritis


Forms:  [facility] (English)

## 2018-03-28 ENCOUNTER — HOSPITAL ENCOUNTER (OUTPATIENT)
Dept: HOSPITAL 14 - H.ER | Age: 63
Setting detail: OBSERVATION
LOS: 2 days | Discharge: HOME | End: 2018-03-30
Attending: INTERNAL MEDICINE | Admitting: INTERNAL MEDICINE
Payer: MEDICARE

## 2018-03-28 VITALS — BODY MASS INDEX: 18.1 KG/M2

## 2018-03-28 DIAGNOSIS — J47.9: ICD-10-CM

## 2018-03-28 DIAGNOSIS — F17.200: ICD-10-CM

## 2018-03-28 DIAGNOSIS — F11.10: ICD-10-CM

## 2018-03-28 DIAGNOSIS — I10: ICD-10-CM

## 2018-03-28 DIAGNOSIS — G89.29: ICD-10-CM

## 2018-03-28 DIAGNOSIS — F41.9: ICD-10-CM

## 2018-03-28 DIAGNOSIS — J15.9: ICD-10-CM

## 2018-03-28 DIAGNOSIS — F32.9: ICD-10-CM

## 2018-03-28 DIAGNOSIS — J69.0: Primary | ICD-10-CM

## 2018-03-28 DIAGNOSIS — Z79.899: ICD-10-CM

## 2018-03-28 LAB
ALBUMIN SERPL-MCNC: 4 G/DL (ref 3.5–5)
ALBUMIN/GLOB SERPL: 1.3 {RATIO} (ref 1–2.1)
ALT SERPL-CCNC: 25 U/L (ref 21–72)
AST SERPL-CCNC: 35 U/L (ref 17–59)
BASOPHILS # BLD AUTO: 0 K/UL (ref 0–0.2)
BASOPHILS NFR BLD: 0.2 % (ref 0–2)
BUN SERPL-MCNC: 12 MG/DL (ref 9–20)
CALCIUM SERPL-MCNC: 9.2 MG/DL (ref 8.4–10.2)
EOSINOPHIL # BLD AUTO: 0 K/UL (ref 0–0.7)
EOSINOPHIL NFR BLD: 0.3 % (ref 0–4)
ERYTHROCYTE [DISTWIDTH] IN BLOOD BY AUTOMATED COUNT: 14.4 % (ref 11.5–14.5)
GFR NON-AFRICAN AMERICAN: > 60
HGB BLD-MCNC: 13.1 G/DL (ref 12–18)
LIPASE SERPL-CCNC: 145 U/L (ref 23–300)
LYMPHOCYTES # BLD AUTO: 1 K/UL (ref 1–4.3)
LYMPHOCYTES NFR BLD AUTO: 7 % (ref 20–40)
MCH RBC QN AUTO: 31.3 PG (ref 27–31)
MCHC RBC AUTO-ENTMCNC: 33.1 G/DL (ref 33–37)
MCV RBC AUTO: 94.7 FL (ref 80–94)
MONOCYTES # BLD: 0.5 K/UL (ref 0–0.8)
MONOCYTES NFR BLD: 4 % (ref 0–10)
NEUTROPHILS # BLD: 12.1 K/UL (ref 1.8–7)
NEUTROPHILS NFR BLD AUTO: 88.5 % (ref 50–75)
NRBC BLD AUTO-RTO: 0 % (ref 0–0)
PLATELET # BLD: 165 K/UL (ref 130–400)
PMV BLD AUTO: 8 FL (ref 7.2–11.7)
RBC # BLD AUTO: 4.18 MIL/UL (ref 4.4–5.9)
WBC # BLD AUTO: 13.7 K/UL (ref 4.8–10.8)

## 2018-03-28 PROCEDURE — 80048 BASIC METABOLIC PNL TOTAL CA: CPT

## 2018-03-28 PROCEDURE — 83880 ASSAY OF NATRIURETIC PEPTIDE: CPT

## 2018-03-28 PROCEDURE — 99285 EMERGENCY DEPT VISIT HI MDM: CPT

## 2018-03-28 PROCEDURE — 71046 X-RAY EXAM CHEST 2 VIEWS: CPT

## 2018-03-28 PROCEDURE — 87040 BLOOD CULTURE FOR BACTERIA: CPT

## 2018-03-28 PROCEDURE — 83690 ASSAY OF LIPASE: CPT

## 2018-03-28 PROCEDURE — 96367 TX/PROPH/DG ADDL SEQ IV INF: CPT

## 2018-03-28 PROCEDURE — 96361 HYDRATE IV INFUSION ADD-ON: CPT

## 2018-03-28 PROCEDURE — 96365 THER/PROPH/DIAG IV INF INIT: CPT

## 2018-03-28 PROCEDURE — 74177 CT ABD & PELVIS W/CONTRAST: CPT

## 2018-03-28 PROCEDURE — 85025 COMPLETE CBC W/AUTO DIFF WBC: CPT

## 2018-03-28 PROCEDURE — 93306 TTE W/DOPPLER COMPLETE: CPT

## 2018-03-28 PROCEDURE — 87086 URINE CULTURE/COLONY COUNT: CPT

## 2018-03-28 PROCEDURE — 81003 URINALYSIS AUTO W/O SCOPE: CPT

## 2018-03-28 PROCEDURE — 96376 TX/PRO/DX INJ SAME DRUG ADON: CPT

## 2018-03-28 PROCEDURE — 96372 THER/PROPH/DIAG INJ SC/IM: CPT

## 2018-03-28 PROCEDURE — 96375 TX/PRO/DX INJ NEW DRUG ADDON: CPT

## 2018-03-28 PROCEDURE — 96368 THER/DIAG CONCURRENT INF: CPT

## 2018-03-28 PROCEDURE — 36415 COLL VENOUS BLD VENIPUNCTURE: CPT

## 2018-03-28 PROCEDURE — 80053 COMPREHEN METABOLIC PANEL: CPT

## 2018-03-28 PROCEDURE — 96366 THER/PROPH/DIAG IV INF ADDON: CPT

## 2018-03-28 PROCEDURE — 82803 BLOOD GASES ANY COMBINATION: CPT

## 2018-03-28 NOTE — ED PDOC
HPI: Abdomen


Time Seen by Provider: 03/28/18 22:45


Chief Complaint (Nursing): Abdominal Pain


Chief Complaint (Provider): Nausea, Vomiting, and Diarrhea


History Per: Patient


History/Exam Limitations: no limitations


Onset/Duration Of Symptoms: Days (3 days ago)


Current Symptoms Are (Timing): Still Present


Location Of Pain/Discomfort: Diffuse, Epigastric


Additional Complaint(s): 





63 yo male with a history of pancreatitis, presents to the ED complaining of 3 

episodes nausea, diffuse abdominal pain, non-bilious, non-bloody vomiting, and 

watery diarrhea, onset of 3 days ago. Patient notes that his pain worsens 

epigastric region, and today he started to have a fever with a cough productive 

of yellow sputum. Of note, patient reports that he was admitted to a hospital 

recently, but signed out against medical advice because he wanted to go smoke a 

cigarette.   





PCP: Kedar Andrade





Past Medical History


Reviewed: Historical Data, Nursing Documentation, Vital Signs


Vital Signs: 


 Last Vital Signs











Temp  97.5 F L  03/29/18 04:19


 


Pulse  75   03/29/18 04:19


 


Resp  18   03/29/18 04:19


 


BP  110/60   03/29/18 04:19


 


Pulse Ox  97   03/29/18 04:19














- Medical History


PMH: Anxiety, Depression, HTN, Pancreatitis (Recurrent), Chronic Pain (abdominal

)


   Denies: HIV, Chronic Kidney Disease





- Family History


Family History: States: Unknown Family Hx





- Social History


Current smoker - smoking cessation education provided: Yes


Alcohol: None


Drugs: Denies





- Immunization History


Hx Tetanus Toxoid Vaccination: Yes


Hx Influenza Vaccination: Yes


Hx Pneumococcal Vaccination: Yes





- Home Medications


Home Medications: 


 Ambulatory Orders











 Medication  Instructions  Recorded


 


Alprazolam [Xanax] 0.5 mg PO TID 12/12/16


 


Paroxetine HCl [Paxil] 20 mg PO DAILY 12/12/16














- Allergies


Allergies/Adverse Reactions: 


 Allergies











Allergy/AdvReac Type Severity Reaction Status Date / Time


 


No Known Allergies Allergy   Verified 02/08/18 14:42














Review of Systems


ROS Statement: Except As Marked, All Systems Reviewed And Found Negative


Constitutional: Positive for: Fever


Respiratory: Positive for: Cough


Gastrointestinal: Positive for: Nausea, Vomiting, Abdominal Pain, Diarrhea.  

Negative for: Melena, Hematochezia, Hematemesis





Physical Exam





- Reviewed


Nursing Documentation Reviewed: Yes


Vital Signs Reviewed: Yes





- Physical Exam


Appears: Positive for: Well, Non-toxic, No Acute Distress


Head Exam: Positive for: ATRAUMATIC, NORMAL INSPECTION, NORMOCEPHALIC


Skin: Positive for: Normal Color, Warm, DRY


Eye Exam: Positive for: EOMI, Normal appearance, PERRL


ENT: Positive for: Normal ENT Inspection


Neck: Positive for: Normal, Painless ROM


Cardiovascular/Chest: Positive for: Regular Rate, Rhythm.  Negative for: Murmur


Respiratory: Positive for: Normal Breath Sounds.  Negative for: Respiratory 

Distress


Gastrointestinal/Abdominal: Positive for: Soft, Tenderness (tenderness to 

palpation in epigastric region).  Negative for: Distended, Guarding, Rebound


Back: Positive for: Normal Inspection


Extremity: Positive for: Normal ROM.  Negative for: Pedal Edema, Deformity


Neurologic/Psych: Positive for: Alert, Oriented (x3).  Negative for: Motor/

Sensory Deficits





- Laboratory Results


Result Diagrams: 


 03/28/18 23:41





 03/28/18 23:41





- ECG


O2 Sat by Pulse Oximetry: 95 (RA)


Pulse Ox Interpretation: Normal





Medical Decision Making


Medical Decision Making: 





Time:


--22:58


Impression: 


--Hx of Pancreatitis presenting with abdominal pain and vomiting


Differential:


--Likely viral gastroenteritis; 


Plan:


--ABD & Pelvis CT


--Labs


--Drug Screen, urine


--Lipase


--Labs


--Chest Two Views X-ray


--Iohexol 50ml PO


--Morphine 2mg IVP


--IV Fluids


--Promehtazine 25mg IV


--Blood Culture


--Urine Culture


--Urinalysis 


-





Reassess


--23:17


Will check Lipase, CT, and VBG, given the patient was complaining of fever.


--23:32


EXAM:


XR Chest, 2 Views


CLINICAL HISTORY:


62 years old, male; Signs and symptoms; Fever; Additional info: Fever, abd pain


TECHNIQUE:


Frontal and lateral views of the chest.


COMPARISON:


CR - CHEST TWO VIEWS (PA/LAT) 2017-02-22 19:55


FINDINGS:


Lungs: Patchy foci of parenchymal opacity right perihilar region and laterally 

within the right lung.


Changes suggestive of focal pneumonitis.


Pleural space: No pleural effusion. No pneumothorax. Mediastinum is 

unremarkable. Heart size


stable. Bones are unremarkable.


Heart: Unremarkable. No cardiomegaly.


Mediastinum: Unremarkable.


Bones/joints: Unremarkable.


IMPRESSION:


Right upper and middle lobe pneumonia.


Remainder of findings as above.


03:35


--EXAM:


CT Abdomen and Pelvis With Intravenous Contrast


CLINICAL HISTORY:


62 years old, male; Pain; Abdominal pain; Acute; Additional info: HX of 

pancreatitis, vomiting, febrile


TECHNIQUE:


Axial computed tomography images of the abdomen and pelvis with intravenous 

contrast. All CT


scans at this facility use one or more dose reduction techniques, viz.: 

automated exposure control;


ma/kV adjustment per patient size (including targeted exams where dose is 

matched to indication; i.e.


head); or iterative reconstruction technique. 589 images are submitted.


Coronal and sagittal reformatted images were created and reviewed.


CONTRAST:


95 mL of omnipaque 300 administered intravenously.


COMPARISON:


No relevant prior studies available.


FINDINGS:


Lower thorax: Trace bilateral pleural fusions. There are patchy parenchymal 

infiltrates in the right


mid lobe lingula bilateral lower lobes with bronchiectasis and chronic lung 

disease. Correlation with


clinical data is recommended if edema versus pneumonia or aspiration pneumonia 

is clinically


suspected. Minimal pericardial effusion. Moderate hiatal hernia with delayed 

emptying versus


gastroesophageal reflux.


ABDOMEN:


Liver: Enlarged fatty liver.


Gallbladder and bile ducts: Unremarkable. No ductal dilation.


Pancreas: Unremarkable. No mass. No ductal dilation.


Spleen: Unremarkable. No splenomegaly.


Adrenals: Bilateral adrenal nodules. Statistically they can represent adrenal 

adenomas but are not


confirmed on the CT scan. The mean Hounsfield unit of left adrenal nodule is 17 

and upright adrenal


nodule is 52.


Kidneys and ureters: Unremarkable. No solid mass. No hydronephrosis.


Stomach and bowel: Large amount of stool in the colon. Correlation with patient'

s clinical history of


constipation is recommended. Diverticulosis. No mucosal thickening.


Appendix: Normal appendix.


PELVIS:


Bladder: Partially distended bladder 11.4 cm with bladder wall thickening. 

Correlation with urinalysis


is recommended only if clinical cystitis is suspected.


Reproductive: Enlarged prostate gland.


ABDOMEN and PELVIS:


Intraperitoneal space: Unremarkable. No free air. No significant fluid 

collection.


Bones/joints: No acute fracture. No dislocation.


Soft tissues: Unremarkable.


Vasculature: Unremarkable. No abdominal aortic aneurysm.


Lymph nodes: Unremarkable. No enlarged lymph nodes.


IMPRESSION:


1. Trace bilateral pleural fusions. There are patchy parenchymal infiltrates in 

the right mid lobe


lingula bilateral lower lobes with bronchiectasis and chronic lung disease. 

Correlation with clinical


data is recommended if edema versus pneumonia or aspiration pneumonia is 

clinically suspected.


2. Probable bilateral adrenal adenomas which are not confirmed on this 

examination.


Correlation with internal medicine evaluation and further workup or followup as 

recommended by


patient's clinical data.





Patient has possible HCAP given recent admission at [facility] [place] for the past 48 hours.

  Will treat for HCAP and admit.  Patient has fever, white count, and elevated 

HR meeting sepsis criteria. However BP is normal. CT is negative for acute 

intrabdominal pathology.  Treated with Van and Cefepime.  Case discussed with 

ARACELIS Harris of Dr. Andrade's service.


Scribe Attestation:


Documented by Delgado Stone acting as a scribe for Ilia Contreras MD. 





Provider Attestation:


All medical record entries made by the Scribe were at my direction and 

personally dictated by me. I have reviewed the chart and agree that the record 

accurately reflects my personal performance of the history, physical exam, 

medical decision making, and the department course for this patient. I have 

also personally directed, reviewed, and agree with the discharge instructions 

and disposition.





Disposition





- Clinical Impression


Clinical Impression: 


 Pneumonia, Sepsis








- Patient ED Disposition


Is Patient to be Admitted: Yes





- Disposition


Disposition Time: 00:00


Condition: FAIR

## 2018-03-28 NOTE — RAD
EXAM:

  XR Chest, 2 Views



CLINICAL HISTORY:

  62 years old, male; Signs and symptoms; Fever; Additional info: Fever, abd 

pain



TECHNIQUE:

  Frontal and lateral views of the chest.



COMPARISON:

  CR - CHEST TWO VIEWS (PA/LAT) 2017-02-22 19:55



FINDINGS:

  Lungs: Patchy foci of parenchymal opacity right perihilar region and 

laterally within the right lung.  Changes suggestive of focal pneumonitis.   

  Pleural space:  No pleural effusion.  No pneumothorax.  Mediastinum is 

unremarkable.  Heart size stable.  Bones are unremarkable.

  Heart:  Unremarkable.  No cardiomegaly.

  Mediastinum:  Unremarkable.

  Bones/joints:  Unremarkable.



IMPRESSION:     

  Right upper and middle lobe pneumonia.



  Remainder of findings as above.

## 2018-03-29 LAB
ANISOCYTOSIS BLD QL SMEAR: SLIGHT
BASE EXCESS BLDV CALC-SCNC: -0.2 MMOL/L (ref 0–2)
BILIRUB UR-MCNC: NEGATIVE MG/DL
COLOR UR: COLORLESS
GLUCOSE UR STRIP-MCNC: (no result) MG/DL
HYPOCHROMIC: (no result)
LEUKOCYTE ESTERASE UR-ACNC: (no result) LEU/UL
LYMPHOCYTE: 7 % (ref 20–50)
MONOCYTE: 3 % (ref 0–10)
NEUTROPHILS NFR BLD AUTO: 88 % (ref 42–75)
NEUTS BAND NFR BLD: 2 % (ref 0–2)
PCO2 BLDV: 41 MMHG (ref 40–60)
PH BLDV: 7.39 [PH] (ref 7.32–7.43)
PH UR STRIP: 7 [PH] (ref 5–8)
PLATELET # BLD EST: NORMAL 10*3/UL
PROT UR STRIP-MCNC: NEGATIVE MG/DL
RBC # UR STRIP: NEGATIVE /UL
SP GR UR STRIP: < 1.005 (ref 1–1.03)
TOTAL CELLS COUNTED BLD: 100
URINE CLARITY: CLEAR
UROBILINOGEN UR-MCNC: (no result) MG/DL (ref 0.2–1)
VENOUS BLOOD FIO2: 21 %
VENOUS BLOOD GAS PO2: 31 MM/HG (ref 30–55)

## 2018-03-29 RX ADMIN — PANTOPRAZOLE SODIUM SCH MG: 20 TABLET, DELAYED RELEASE ORAL at 10:07

## 2018-03-29 RX ADMIN — Medication SCH MG: at 10:07

## 2018-03-29 RX ADMIN — Medication SCH MG: at 16:57

## 2018-03-29 NOTE — CP.PCM.HP
History of Present Illness





- History of Present Illness


History of Present Illness: 





Patient evaluated with Dr Andrade during morning rounds


63 yo male with a history of pancreatitis, admitted to hosp after presenting 

with 3 episodes nausea, diffuse abdominal pain, NBNB vomiting, and watery 

diarrhea for the previous 3 days. Also c/o fever with a cough productive of 

yellow sputum for 1 day. [place] [facility] [place] At 

ED patient found to have suspected pneumonitis/pneumonia and he is admitted for 

poss HCAP, urine postive for opioids  ProBnp slightly elevated and normal 

Lipase and mild leukocytosis. Today patient seen at bedside in not acute 

distress ans c/o diffuse abd pain. After initially denying opioid use he 

admitted taking "1" percocet at home.





Present on Admission





- Present on Admission


Any Indicators Present on Admission: No





Review of Systems





- Review of Systems


All systems: reviewed and no additional remarkable complaints except (Those 

described on HPI)





Past Patient History





- Infectious Disease


Hx of Infectious Diseases: None





- Past Medical History & Family History


Past Medical History?: Yes





- Past Social History


Smoking Status: Heavy Smoker > 10 Cigarettes Daily


Drugs: Opiates





- CARDIAC


Hx Cardiac Disorders: Yes


Hx Hypertension: Yes





- PULMONARY


Hx Respiratory Disorders: No





- NEUROLOGICAL


Hx Neurological Disorder: No





- HEENT


Hx HEENT Problems: No





- RENAL


Hx Chronic Kidney Disease: No





- ENDOCRINE/METABOLIC


Hx Endocrine Disorders: No





- HEMATOLOGICAL/ONCOLOGICAL


Hx Blood Disorders: No


Hx Human Immunodeficiency Virus (HIV): No





- INTEGUMENTARY


Hx Dermatological Problems: No





- MUSCULOSKELETAL/RHEUMATOLOGICAL


Hx Musculoskeletal Disorders: No


Hx Falls: No





- GASTROINTESTINAL


Hx Gastrointestinal Disorders: Yes


Hx Pancreatitis: Yes (Recurrent)





- GENITOURINARY/GYNECOLOGICAL


Hx Genitourinary Disorders: No





- PSYCHIATRIC


Hx Psychophysiologic Disorder: Yes


Hx Anxiety: Yes


Hx Depression: Yes


Hx Substance Use: No





- SURGICAL HISTORY


Hx Surgeries: No





- ANESTHESIA


Hx Anesthesia: No


Hx Anesthesia Reactions: No


Hx Malignant Hyperthermia: No





Meds


Allergies/Adverse Reactions: 


 Allergies











Allergy/AdvReac Type Severity Reaction Status Date / Time


 


No Known Allergies Allergy   Verified 02/08/18 14:42














Physical Exam





- Constitutional


Appears: Non-toxic, No Acute Distress


Additional comments: 





Disheveled





- Eye Exam


Eye Exam: EOMI, PERRL





- ENT Exam


ENT Exam: Mucous Membranes Moist





- Respiratory Exam


Respiratory Exam: Decreased Breath Sounds, Clear to Auscultation Bilateral, 

NORMAL BREATHING PATTERN.  absent: Rales, Rhonchi, Wheezes, Respiratory Distress





- Cardiovascular Exam


Cardiovascular Exam: REGULAR RHYTHM, +S1, +S2.  absent: Gallop





- GI/Abdominal Exam


GI & Abdominal Exam: Normal Bowel Sounds, Soft, Tenderness (Diffuse, mild).  

absent: Firm, Guarding, Rebound, Rigid





- Extremities Exam


Extremities exam: Positive for: normal capillary refill, pedal pulses present.  

Negative for: calf tenderness, pedal edema





- Back Exam


Back exam: absent: CVA tenderness (L), CVA tenderness (R)





- Neurological Exam


Neurological exam: Alert, Oriented x3





- Skin


Skin Exam: Normal Color, Warm





Results





- Vital Signs


Recent Vital Signs: 





 Last Vital Signs











Temp  98.6 F   03/29/18 08:00


 


Pulse  71   03/29/18 08:00


 


Resp  20   03/29/18 08:00


 


BP  116/67   03/29/18 08:00


 


Pulse Ox  96   03/29/18 08:00














- Labs


Result Diagrams: 


 03/28/18 23:41





 03/28/18 23:41


Labs: 





 Laboratory Results - last 24 hr











  03/28/18 03/28/18 03/29/18





  23:41 23:41 00:08


 


WBC  13.7 H D  


 


RBC  4.18 L  


 


Hgb  13.1  


 


Hct  39.6  


 


MCV  94.7 H  


 


MCH  31.3 H  


 


MCHC  33.1  


 


RDW  14.4  


 


Plt Count  165  


 


MPV  8.0  


 


Neut % (Auto)  88.5 H  


 


Lymph % (Auto)  7.0 L  


 


Mono % (Auto)  4.0  


 


Eos % (Auto)  0.3  


 


Baso % (Auto)  0.2  


 


Neut # (Auto)  12.1 H  


 


Lymph # (Auto)  1.0  


 


Mono # (Auto)  0.5  


 


Eos # (Auto)  0.0  


 


Baso # (Auto)  0.0  


 


Neutrophils % (Manual)  88 H  


 


Band Neutrophils %  2  


 


Lymphocytes % (Manual)  7 L  


 


Monocytes % (Manual)  3  


 


Platelet Estimate  Normal  


 


Hypochromasia (manual)  Moderate  


 


Anisocytosis (manual)  Slight  


 


pO2   


 


VBG pH   


 


VBG pCO2   


 


VBG HCO3   


 


VBG Total CO2   


 


VBG O2 Sat (Calc)   


 


VBG Base Excess   


 


VBG Potassium   


 


Glucose   


 


Lactate   


 


FiO2   


 


Sodium   141 


 


Potassium   4.3 


 


Chloride   106 


 


Carbon Dioxide   20 L 


 


Anion Gap   19 


 


BUN   12 


 


Creatinine   0.7 L 


 


Est GFR ( Amer)   > 60 


 


Est GFR (Non-Af Amer)   > 60 


 


Random Glucose   110 


 


Calcium   9.2 


 


Total Bilirubin   0.7 


 


AST   35 


 


ALT   25 


 


Alkaline Phosphatase   71 


 


NT-Pro-B Natriuret Pep    2200 H


 


Total Protein   7.2 


 


Albumin   4.0 


 


Globulin   3.2 


 


Albumin/Globulin Ratio   1.3 


 


Lipase   145 


 


Venous Blood Potassium   


 


Urine Color   


 


Urine Clarity   


 


Urine pH   


 


Ur Specific Gravity   


 


Urine Protein   


 


Urine Glucose (UA)   


 


Urine Ketones   


 


Urine Blood   


 


Urine Nitrate   


 


Urine Bilirubin   


 


Urine Urobilinogen   


 


Ur Leukocyte Esterase   


 


Urine RBC (Auto)   


 


Urine Opiates Screen   


 


Urine Methadone Screen   


 


Ur Barbiturates Screen   


 


Ur Phencyclidine Scrn   


 


Ur Amphetamines Screen   


 


U Benzodiazepines Scrn   


 


U Oth Cocaine Metabols   


 


U Cannabinoids Screen   














  03/29/18 03/29/18 03/29/18





  00:21 01:03 01:03


 


WBC   


 


RBC   


 


Hgb   


 


Hct   


 


MCV   


 


MCH   


 


MCHC   


 


RDW   


 


Plt Count   


 


MPV   


 


Neut % (Auto)   


 


Lymph % (Auto)   


 


Mono % (Auto)   


 


Eos % (Auto)   


 


Baso % (Auto)   


 


Neut # (Auto)   


 


Lymph # (Auto)   


 


Mono # (Auto)   


 


Eos # (Auto)   


 


Baso # (Auto)   


 


Neutrophils % (Manual)   


 


Band Neutrophils %   


 


Lymphocytes % (Manual)   


 


Monocytes % (Manual)   


 


Platelet Estimate   


 


Hypochromasia (manual)   


 


Anisocytosis (manual)   


 


pO2  31  


 


VBG pH  7.39  


 


VBG pCO2  41  


 


VBG HCO3  23.7  


 


VBG Total CO2  26.1  


 


VBG O2 Sat (Calc)  67.0 H  


 


VBG Base Excess  -0.2 L  


 


VBG Potassium  3.8  


 


Glucose  102  


 


Lactate  1.0  


 


FiO2  21.0  


 


Sodium  137.0  


 


Potassium   


 


Chloride  106.0  


 


Carbon Dioxide   


 


Anion Gap   


 


BUN   


 


Creatinine   


 


Est GFR ( Amer)   


 


Est GFR (Non-Af Amer)   


 


Random Glucose   


 


Calcium   


 


Total Bilirubin   


 


AST   


 


ALT   


 


Alkaline Phosphatase   


 


NT-Pro-B Natriuret Pep   


 


Total Protein   


 


Albumin   


 


Globulin   


 


Albumin/Globulin Ratio   


 


Lipase   


 


Venous Blood Potassium  3.8  


 


Urine Color    Colorless


 


Urine Clarity    Clear


 


Urine pH    7.0


 


Ur Specific Gravity    < 1.005


 


Urine Protein    Negative


 


Urine Glucose (UA)    Neg


 


Urine Ketones    Negative


 


Urine Blood    Negative


 


Urine Nitrate    Negative


 


Urine Bilirubin    Negative


 


Urine Urobilinogen    0.2-1.0


 


Ur Leukocyte Esterase    Neg


 


Urine RBC (Auto)    < 1


 


Urine Opiates Screen   Positive H 


 


Urine Methadone Screen   Negative 


 


Ur Barbiturates Screen   Negative 


 


Ur Phencyclidine Scrn   Negative 


 


Ur Amphetamines Screen   Negative 


 


U Benzodiazepines Scrn   Negative 


 


U Oth Cocaine Metabols   Negative 


 


U Cannabinoids Screen   Negative 














Assessment & Plan





- Assessment and Plan (Free Text)


Assessment: 





Suspected aspiration pneumonia vs HCAP


mild leukocytosis


Legionella ag ordered


F/U UCx and BCx


Opioid abuse


Elevate Probnp. R/O CHF


IV abx Unasyn + Zithromax


Echocardiogram F/u results


Lasix 20 mg IV once


Protonix


Avoid opioids


Watch for withdrawal symptoms

## 2018-03-29 NOTE — CT
EXAM:

  CT Abdomen and Pelvis With Intravenous Contrast



CLINICAL HISTORY:

  62 years old, male; Pain; Abdominal pain; Acute; Additional info: HX of 

pancreatitis, vomiting, febrile



TECHNIQUE:

  Axial computed tomography images of the abdomen and pelvis with intravenous 

contrast.  All CT scans at this facility use one or more dose reduction 

techniques, viz.: automated exposure control; ma/kV adjustment per patient size 

(including targeted exams where dose is matched to indication; i.e. head); or 

iterative reconstruction technique.  589 images are submitted.

  Coronal and sagittal reformatted images were created and reviewed.



CONTRAST:

  95 mL of omnipaque 300 administered intravenously.



COMPARISON:

  No relevant prior studies available.



FINDINGS:

  Lower thorax:  Trace bilateral pleural fusions.  There are patchy parenchymal 

infiltrates in the right mid lobe lingula bilateral lower lobes with 

bronchiectasis and chronic lung disease.  Correlation with clinical data is 

recommended if edema versus pneumonia or aspiration pneumonia is clinically 

suspected.  Minimal pericardial effusion.  Moderate hiatal hernia with delayed 

emptying versus gastroesophageal reflux.



 ABDOMEN:

  Liver:  Enlarged fatty liver.

  Gallbladder and bile ducts:  Unremarkable.   No ductal dilation.

  Pancreas:  Unremarkable.  No mass.  No ductal dilation.

  Spleen:  Unremarkable.  No splenomegaly.

  Adrenals:  Bilateral adrenal nodules.  Statistically they can represent 

adrenal adenomas but are not confirmed on the CT scan.  The mean Hounsfield 

unit of left adrenal nodule is 17 and upright adrenal nodule is 52.

  Kidneys and ureters:  Unremarkable.  No solid mass.  No hydronephrosis.

  Stomach and bowel:  Large amount of stool in the colon.  Correlation with 

patient's clinical history of constipation  is recommended.  Diverticulosis.  

No mucosal thickening.

  Appendix:  Normal appendix.



 PELVIS:

  Bladder:  Partially distended bladder 11.4 cm with bladder wall thickening.  

Correlation with urinalysis is recommended only if clinical cystitis is 

suspected.

  Reproductive:  Enlarged prostate gland.



 ABDOMEN and PELVIS:

  Intraperitoneal space:  Unremarkable.  No free air.  No significant fluid 

collection.

  Bones/joints:  No acute fracture.  No dislocation.

  Soft tissues:  Unremarkable.

  Vasculature:  Unremarkable.  No abdominal aortic aneurysm.

  Lymph nodes:  Unremarkable.  No enlarged lymph nodes.



IMPRESSION:     

1.  Trace bilateral pleural fusions.  There are patchy parenchymal infiltrates 

in the right mid lobe lingula bilateral lower lobes with bronchiectasis and 

chronic lung disease.  Correlation with clinical data is recommended if edema 

versus pneumonia or aspiration pneumonia is clinically suspected.

2.  Probable bilateral adrenal adenomas which are not confirmed on this 

examination.

Correlation with internal medicine evaluation and further workup or followup as 

recommended by patient's clinical data.

## 2018-03-29 NOTE — CARD
--------------- APPROVED REPORT --------------





EXAM: Two-dimensional and M-mode echocardiogram with Doppler and 

color Doppler.



Other Information 

Quality : GoodRhythm : NSR



INDICATION

Dyspnea Elevated Troponin



2D DIMENSIONS 

IVSd0.87   (0.7-1.1cm)LVDd4.40   (3.9-5.9cm)

LVOT Diameter2.20   (1.8-2.4cm)PWd0.87   (0.7-1.1cm)

IVSs1.09   (0.8-1.2cm)LVDs3.02   (2.5-4.0cm)

FS (%) 31.3   %PWs1.08   (0.8-1.2cm)

LVEF (%)55.0   (>50%)



M-Mode DIMENSIONS 

Left Atrium (MM)4.97   (2.5-4.0cm)IVSd0.59   (0.7-1.1cm)

Aortic Root2.83   (2.2-3.7cm)LVDd5.48   (4.0-5.6cm)

Aortic Cusp Exc.1.90   (1.5-2.0cm)PWd0.75   (0.7-1.1cm)

IVSs0.95   cmFS (%) 39   %

LVDs3.35   (2.0-3.8cm)PWs1.21   cm



Mitral Valve

MV E Odrlzzrr67.5cm/sMV DECEL [id number] A Ufoievey45.7cm/s

MV RXT91ogY/A ratio1.3MVA (PHT)2.91cm2



TDI

Lateral E' Peak V14.26cm/sMedial E' Peak V9.37cm/sE/Lateral E'5.4

E/Medial E'8.2



Pulmonary Valve

PV Peak Jytnhyhj054.2cm/s



 LEFT VENTRICLE 

The left ventricle is normal size.

There is normal left ventricular wall thickness.

The left ventricular function is normal.

The left ventricular ejection fraction is within the normal range.

There is normal LV segmental wall motion.

The left ventricular diastolic function is normal.



 RIGHT VENTRICLE 

The right ventricle is normal size.

There is normal right ventricular wall thickness.

The right ventricular systolic function is normal.



 ATRIA 

The left atrium is mildly dilated.

The right atrium size is normal.



 AORTIC VALVE 

The aortic valve is normal in structure.

No aortic regurgitation is present.

There is no aortic valvular stenosis.



 MITRAL VALVE 

The mitral valve is normal in structure.

There is no mitral valve stenosis.

There is no mitral valve regurgitation noted.



 TRICUSPID VALVE 

The tricuspid valve is normal in structure.

There is trace to mild tricuspid regurgitation.



 PULMONIC VALVE 

The pulmonary valve is normal in structure.

There is no pulmonic valvular regurgitation.



 GREAT VESSELS 

The aortic root is normal in size.

The IVC is normal in size and collapses >50% with inspiration.



 PERICARDIAL EFFUSION 

The pericardium appears normal.

There is no pleural effusion.



<Conclusion>

The left ventricle is normal size.

There is normal left ventricular wall thickness.

The left ventricular function is normal.

The left ventricular ejection fraction is within the normal range.

There is normal LV segmental wall motion.

The left ventricular diastolic function is normal.

## 2018-03-30 VITALS
OXYGEN SATURATION: 98 % | TEMPERATURE: 97.9 F | SYSTOLIC BLOOD PRESSURE: 138 MMHG | DIASTOLIC BLOOD PRESSURE: 87 MMHG | HEART RATE: 68 BPM

## 2018-03-30 VITALS — RESPIRATION RATE: 20 BRPM

## 2018-03-30 LAB
BASOPHILS # BLD AUTO: 0.1 K/UL (ref 0–0.2)
BASOPHILS NFR BLD: 0.8 % (ref 0–2)
BUN SERPL-MCNC: 14 MG/DL (ref 9–20)
CALCIUM SERPL-MCNC: 8.8 MG/DL (ref 8.4–10.2)
EOSINOPHIL # BLD AUTO: 0.1 K/UL (ref 0–0.7)
EOSINOPHIL NFR BLD: 0.8 % (ref 0–4)
ERYTHROCYTE [DISTWIDTH] IN BLOOD BY AUTOMATED COUNT: 14.1 % (ref 11.5–14.5)
GFR NON-AFRICAN AMERICAN: > 60
HGB BLD-MCNC: 12.5 G/DL (ref 12–18)
LYMPHOCYTES # BLD AUTO: 1.5 K/UL (ref 1–4.3)
LYMPHOCYTES NFR BLD AUTO: 20.2 % (ref 20–40)
MCH RBC QN AUTO: 31.5 PG (ref 27–31)
MCHC RBC AUTO-ENTMCNC: 33.5 G/DL (ref 33–37)
MCV RBC AUTO: 94.1 FL (ref 80–94)
MONOCYTES # BLD: 0.6 K/UL (ref 0–0.8)
MONOCYTES NFR BLD: 7.6 % (ref 0–10)
NEUTROPHILS # BLD: 5.3 K/UL (ref 1.8–7)
NEUTROPHILS NFR BLD AUTO: 70.6 % (ref 50–75)
NRBC BLD AUTO-RTO: 0 % (ref 0–0)
PLATELET # BLD: 164 K/UL (ref 130–400)
PMV BLD AUTO: 8 FL (ref 7.2–11.7)
RBC # BLD AUTO: 3.98 MIL/UL (ref 4.4–5.9)
WBC # BLD AUTO: 7.5 K/UL (ref 4.8–10.8)

## 2018-03-30 RX ADMIN — Medication SCH MG: at 09:53

## 2018-03-30 RX ADMIN — PANTOPRAZOLE SODIUM SCH MG: 20 TABLET, DELAYED RELEASE ORAL at 09:53

## 2018-03-30 NOTE — CP.PCM.DIS
Provider





- Provider


Date of Admission: 


03/29/18 00:48





Attending physician: 


Kedar Andrade MD





Time Spent in preparation of Discharge (in minutes): 30





Diagnosis





- Discharge Diagnosis


(1) Aspiration pneumonia


Status: Acute   





Hospital Course





- Lab Results


Lab Results: 


 Micro Results





03/29/18 06:00   Urine,Clean Catch   Urine Culture - Final


                            No Growth (<1,000 CFU/ML)


03/28/18 23:35   Blood-Venous   Blood Culture - Preliminary


                            NO GROWTH AFTER 24 HOURS





 Most Recent Lab Values











WBC  7.5 K/uL (4.8-10.8)   03/30/18  05:00    


 


RBC  3.98 Mil/uL (4.40-5.90)  L  03/30/18  05:00    


 


Hgb  12.5 g/dL (12.0-18.0)   03/30/18  05:00    


 


Hct  37.5 % (35.0-51.0)   03/30/18  05:00    


 


MCV  94.1 fl (80.0-94.0)  H  03/30/18  05:00    


 


MCH  31.5 pg (27.0-31.0)  H  03/30/18  05:00    


 


MCHC  33.5 g/dL (33.0-37.0)   03/30/18  05:00    


 


RDW  14.1 % (11.5-14.5)   03/30/18  05:00    


 


Plt Count  164 K/uL (130-400)   03/30/18  05:00    


 


MPV  8.0 fl (7.2-11.7)   03/30/18  05:00    


 


Neut % (Auto)  70.6 % (50.0-75.0)   03/30/18  05:00    


 


Lymph % (Auto)  20.2 % (20.0-40.0)   03/30/18  05:00    


 


Mono % (Auto)  7.6 % (0.0-10.0)   03/30/18  05:00    


 


Eos % (Auto)  0.8 % (0.0-4.0)   03/30/18  05:00    


 


Baso % (Auto)  0.8 % (0.0-2.0)   03/30/18  05:00    


 


Neut # (Auto)  5.3 K/uL (1.8-7.0)   03/30/18  05:00    


 


Lymph # (Auto)  1.5 K/uL (1.0-4.3)   03/30/18  05:00    


 


Mono # (Auto)  0.6 K/uL (0.0-0.8)   03/30/18  05:00    


 


Eos # (Auto)  0.1 K/uL (0.0-0.7)   03/30/18  05:00    


 


Baso # (Auto)  0.1 K/uL (0.0-0.2)   03/30/18  05:00    


 


Neutrophils % (Manual)  88 % (42-75)  H  03/28/18  23:41    


 


Band Neutrophils %  2 % (0-2)   03/28/18  23:41    


 


Lymphocytes % (Manual)  7 % (20-50)  L  03/28/18  23:41    


 


Monocytes % (Manual)  3 % (0-10)   03/28/18  23:41    


 


Platelet Estimate  Normal  (NORMAL)   03/28/18  23:41    


 


Hypochromasia (manual)  Moderate   03/28/18  23:41    


 


Anisocytosis (manual)  Slight   03/28/18  23:41    


 


pO2  31 mm/Hg (30-55)   03/29/18  00:21    


 


VBG pH  7.39  (7.32-7.43)   03/29/18  00:21    


 


VBG pCO2  41 mmHg (40-60)   03/29/18  00:21    


 


VBG HCO3  23.7 mmol/L  03/29/18  00:21    


 


VBG Total CO2  26.1 mmol/L (22-28)   03/29/18  00:21    


 


VBG O2 Sat (Calc)  67.0 % (40-65)  H  03/29/18  00:21    


 


VBG Base Excess  -0.2 mmol/L (0.0-2.0)  L  03/29/18  00:21    


 


VBG Potassium  3.8 mmol/L (3.6-5.2)   03/29/18  00:21    


 


Sodium  137.0 mmol/L (132-148)   03/29/18  00:21    


 


Chloride  106.0 mmol/L ()   03/29/18  00:21    


 


Glucose  102 mg/dL ()   03/29/18  00:21    


 


Lactate  1.0 mmol/L (0.7-2.1)   03/29/18  00:21    


 


FiO2  21.0 %  03/29/18  00:21    


 


Sodium  145 mmol/l (132-148)   03/30/18  05:00    


 


Potassium  3.9 MMOL/L (3.6-5.0)   03/30/18  05:00    


 


Chloride  110 mmol/L ()  H  03/30/18  05:00    


 


Carbon Dioxide  22 mmol/L (22-30)   03/30/18  05:00    


 


Anion Gap  17  (10-20)   03/30/18  05:00    


 


BUN  14 mg/dl (9-20)   03/30/18  05:00    


 


Creatinine  0.9 mg/dl (0.8-1.5)   03/30/18  05:00    


 


Est GFR ( Amer)  > 60   03/30/18  05:00    


 


Est GFR (Non-Af Amer)  > 60   03/30/18  05:00    


 


Random Glucose  89 mg/dL ()   03/30/18  05:00    


 


Calcium  8.8 mg/dL (8.4-10.2)   03/30/18  05:00    


 


Total Bilirubin  0.7 mg/dl (0.2-1.3)   03/28/18  23:41    


 


AST  35 U/L (17-59)   03/28/18  23:41    


 


ALT  25 U/L (21-72)   03/28/18  23:41    


 


Alkaline Phosphatase  71 U/L ()   03/28/18  23:41    


 


NT-Pro-B Natriuret Pep  2200 pg/ml (0-900)  H  03/29/18  00:08    


 


Total Protein  7.2 G/DL (6.3-8.2)   03/28/18  23:41    


 


Albumin  4.0 g/dL (3.5-5.0)   03/28/18  23:41    


 


Globulin  3.2 gm/dL (2.2-3.9)   03/28/18  23:41    


 


Albumin/Globulin Ratio  1.3  (1.0-2.1)   03/28/18  23:41    


 


Lipase  145 U/L ()   03/28/18  23:41    


 


Venous Blood Potassium  3.8 mmol/L (3.6-5.2)   03/29/18  00:21    


 


Urine Color  Colorless  (YELLOW)   03/29/18  01:03    


 


Urine Clarity  Clear  (Clear)   03/29/18  01:03    


 


Urine pH  7.0  (5.0-8.0)   03/29/18  01:03    


 


Ur Specific Gravity  < 1.005  (1.003-1.030)   03/29/18  01:03    


 


Urine Protein  Negative mg/dL (NEGATIVE)   03/29/18  01:03    


 


Urine Glucose (UA)  Neg mg/dL (Normal)   03/29/18  01:03    


 


Urine Ketones  Negative mg/dL (NEGATIVE)   03/29/18  01:03    


 


Urine Blood  Negative  (NEGATIVE)   03/29/18  01:03    


 


Urine Nitrate  Negative  (NEGATIVE)   03/29/18  01:03    


 


Urine Bilirubin  Negative  (NEGATIVE)   03/29/18  01:03    


 


Urine Urobilinogen  0.2-1.0 mg/dL (0.2-1.0)   03/29/18  01:03    


 


Ur Leukocyte Esterase  Neg Niru/uL (Negative)   03/29/18  01:03    


 


Urine RBC (Auto)  < 1 /hpf (0-3)   03/29/18  01:03    


 


Urine Opiates Screen  Positive  (NEGATIVE)  H  03/29/18  01:03    


 


Urine Methadone Screen  Negative  (NEGATIVE)   03/29/18  01:03    


 


Ur Barbiturates Screen  Negative  (NEGATIVE)   03/29/18  01:03    


 


Ur Phencyclidine Scrn  Negative  (NEGATIVE)   03/29/18  01:03    


 


Ur Amphetamines Screen  Negative  (NEGATIVE)   03/29/18  01:03    


 


U Benzodiazepines Scrn  Negative  (NEGATIVE)   03/29/18  01:03    


 


U Oth Cocaine Metabols  Negative  (NEGATIVE)   03/29/18  01:03    


 


U Cannabinoids Screen  Negative  (NEGATIVE)   03/29/18  01:03    














- Hospital Course


Hospital Course: 








61 y/o admitted for pneumonitis with suspected aspiration pneumonia due to 

vomiting episodes before presenting to hosp treated with IV abx for 2 days, 

remained stable, with VS stable and no WBC, neg UCx. Patient is stable today to 

be DC home to c/w treatment as outpatient for suspected bacterial pneumonia due 

to aspiration.





Discharge Exam





- Head Exam


Head Exam: ATRAUMATIC, NORMAL INSPECTION, NORMOCEPHALIC





- Eye Exam


Eye Exam: EOMI, PERRL





- ENT Exam


ENT Exam: Mucous Membranes Moist





- Respiratory Exam


Respiratory Exam: Decreased Breath Sounds, NORMAL BREATHING PATTERN.  absent: 

Rales, Wheezes, Respiratory Distress, Stridor





- Cardiovascular Exam


Cardiovascular Exam: REGULAR RHYTHM, +S1, +S2.  absent: Gallop





- GI/Abdominal Exam


GI & Abdominal Exam: Normal Bowel Sounds, Soft, Unremarkable.  absent: 

Tenderness





- Neurological Exam


Neurological exam: Alert, Oriented x3





- Skin


Skin Exam: Normal Color, Warm





Discharge Plan





- Discharge Medications


Prescriptions: 


Amoxicillin/Clavulanate [Augmentin 875 MG-125 MG] 1 tab PO Q12 #14 tab


Azithromycin [Zithromax Tri-Panchito] 500 mg PO DAILY #5 tablet





- Follow Up Plan


Condition: STABLE


Disposition: HOME/ ROUTINE


Instructions:  Pneumonia, Adult (DC)


Referrals: 


Horacio Avila MD [Staff Provider] -

## 2018-04-05 ENCOUNTER — HOSPITAL ENCOUNTER (EMERGENCY)
Dept: HOSPITAL 14 - H.ER | Age: 63
Discharge: HOME | End: 2018-04-05
Payer: MEDICARE

## 2018-04-05 VITALS
OXYGEN SATURATION: 99 % | HEART RATE: 87 BPM | SYSTOLIC BLOOD PRESSURE: 116 MMHG | TEMPERATURE: 97.8 F | RESPIRATION RATE: 18 BRPM | DIASTOLIC BLOOD PRESSURE: 74 MMHG

## 2018-04-05 VITALS — BODY MASS INDEX: 18.1 KG/M2

## 2018-04-05 DIAGNOSIS — F32.9: ICD-10-CM

## 2018-04-05 DIAGNOSIS — F41.9: ICD-10-CM

## 2018-04-05 DIAGNOSIS — K52.9: Primary | ICD-10-CM

## 2018-04-05 DIAGNOSIS — G89.29: ICD-10-CM

## 2018-04-05 DIAGNOSIS — I10: ICD-10-CM

## 2018-04-05 LAB
ALBUMIN SERPL-MCNC: 4.3 G/DL (ref 3.5–5)
ALBUMIN/GLOB SERPL: 1.4 {RATIO} (ref 1–2.1)
ALT SERPL-CCNC: 39 U/L (ref 21–72)
AST SERPL-CCNC: 30 U/L (ref 17–59)
BASOPHILS # BLD AUTO: 0.1 K/UL (ref 0–0.2)
BASOPHILS NFR BLD: 0.8 % (ref 0–2)
BUN SERPL-MCNC: 15 MG/DL (ref 9–20)
CALCIUM SERPL-MCNC: 9.8 MG/DL (ref 8.4–10.2)
EOSINOPHIL # BLD AUTO: 0.1 K/UL (ref 0–0.7)
EOSINOPHIL NFR BLD: 1.9 % (ref 0–4)
ERYTHROCYTE [DISTWIDTH] IN BLOOD BY AUTOMATED COUNT: 13.7 % (ref 11.5–14.5)
GFR NON-AFRICAN AMERICAN: > 60
HGB BLD-MCNC: 13.5 G/DL (ref 12–18)
LIPASE SERPL-CCNC: 337 U/L (ref 23–300)
LYMPHOCYTES # BLD AUTO: 1.7 K/UL (ref 1–4.3)
LYMPHOCYTES NFR BLD AUTO: 23 % (ref 20–40)
MCH RBC QN AUTO: 31.9 PG (ref 27–31)
MCHC RBC AUTO-ENTMCNC: 34.4 G/DL (ref 33–37)
MCV RBC AUTO: 92.6 FL (ref 80–94)
MONOCYTES # BLD: 0.6 K/UL (ref 0–0.8)
MONOCYTES NFR BLD: 7.8 % (ref 0–10)
NEUTROPHILS # BLD: 5 K/UL (ref 1.8–7)
NEUTROPHILS NFR BLD AUTO: 66.5 % (ref 50–75)
NRBC BLD AUTO-RTO: 0.3 % (ref 0–0)
PLATELET # BLD: 288 K/UL (ref 130–400)
PMV BLD AUTO: 7.6 FL (ref 7.2–11.7)
RBC # BLD AUTO: 4.23 MIL/UL (ref 4.4–5.9)
WBC # BLD AUTO: 7.5 K/UL (ref 4.8–10.8)

## 2018-04-05 PROCEDURE — 99283 EMERGENCY DEPT VISIT LOW MDM: CPT

## 2018-04-05 PROCEDURE — 83690 ASSAY OF LIPASE: CPT

## 2018-04-05 PROCEDURE — 80053 COMPREHEN METABOLIC PANEL: CPT

## 2018-04-05 PROCEDURE — 85025 COMPLETE CBC W/AUTO DIFF WBC: CPT

## 2018-04-05 PROCEDURE — 71046 X-RAY EXAM CHEST 2 VIEWS: CPT

## 2018-04-05 PROCEDURE — 96374 THER/PROPH/DIAG INJ IV PUSH: CPT

## 2018-04-05 NOTE — ED PDOC
HPI: Abdomen


Time Seen by Provider: 04/05/18 02:32


Chief Complaint (Nursing): Back Pain


Chief Complaint (Provider): abdominal pain


History Per: Patient


History/Exam Limitations: no limitations


Onset/Duration Of Symptoms: Hrs


Current Symptoms Are (Timing): Still Present


Location Of Pain/Discomfort: Diffuse


Associated Symptoms: Nausea, Diarrhea


Additional Complaint(s): 





63 y/o male brought in by EMS for evaluation of diffuse abdominal pain x 5 

hours.  Associated nausea, diarrhea.  Patient states he had similar symptoms 

last week and was admitted for pneumonia.  Denies fever, vomiting, cough, chest 

pain, shortness of breath, palpitations, urinary symptoms, recent travel, sick 

contacts.  





Past Medical History


Reviewed: Historical Data, Nursing Documentation, Vital Signs


Vital Signs: 


 Last Vital Signs











Temp  97.8 F   04/05/18 02:24


 


Pulse  87   04/05/18 02:24


 


Resp  18   04/05/18 02:24


 


BP  116/74   04/05/18 02:24


 


Pulse Ox  99   04/05/18 04:59














- Medical History


PMH: Anxiety, Depression, HTN, Pancreatitis (Recurrent), Chronic Pain (abdominal

)


   Denies: HIV, Chronic Kidney Disease





- Family History


Family History: States: Unknown Family Hx





- Immunization History


Hx Tetanus Toxoid Vaccination: Yes


Hx Influenza Vaccination: Yes


Hx Pneumococcal Vaccination: Yes





- Home Medications


Home Medications: 


 Ambulatory Orders











 Medication  Instructions  Recorded


 


Alprazolam [Xanax] 0.5 mg PO TID 12/12/16


 


Paroxetine HCl [Paxil] 20 mg PO DAILY 12/12/16


 


Amoxicillin/Clavulanate [Augmentin 1 tab PO Q12 #14 tab 03/30/18





875 MG-125 MG]  


 


Azithromycin [Zithromax Tri-Panchito] 500 mg PO DAILY #5 tablet 03/30/18


 


Dicyclomine [Bentyl] 20 mg PO TID PRN #15 tab 04/05/18


 


Ondansetron ODT [Zofran ODT] 4 mg PO Q8 PRN #10 odt 04/05/18














- Allergies


Allergies/Adverse Reactions: 


 Allergies











Allergy/AdvReac Type Severity Reaction Status Date / Time


 


No Known Allergies Allergy   Verified 04/05/18 02:26














Review of Systems


ROS Statement: Except As Marked, All Systems Reviewed And Found Negative


Gastrointestinal: Positive for: Nausea, Abdominal Pain, Diarrhea





Physical Exam





- Reviewed


Nursing Documentation Reviewed: Yes


Vital Signs Reviewed: Yes





- Physical Exam


Appears: Positive for: Well, Non-toxic, No Acute Distress


Head Exam: Positive for: ATRAUMATIC, NORMAL INSPECTION, NORMOCEPHALIC


Skin: Positive for: Normal Color


Eye Exam: Positive for: Normal appearance


ENT: Positive for: Normal ENT Inspection


Cardiovascular/Chest: Positive for: Regular Rate, Rhythm


Respiratory: Positive for: Normal Breath Sounds


Gastrointestinal/Abdominal: Positive for: Bowel Sounds, Soft, Tenderness (

diffuse)


Back: Positive for: Normal Inspection


Extremity: Positive for: Normal ROM


Neurologic/Psych: Positive for: Alert, Oriented





- Laboratory Results


Result Diagrams: 


 04/05/18 04:23





 04/05/18 04:23





- ECG


O2 Sat by Pulse Oximetry: 99





- Radiology


X-Ray: Viewed By Me


X-Ray Interpretation: No Acute Disease





- Progress


ED Course And Treament: 





labs, chest xray, IV fluids, PO zofran





On re-eval, patient resting comfortably; states he is feeling better.


Patient educated on findings, discharged with rx Zofran, Bentyl.


Advised follow up PMD/GI.


Fluids. 


[place] diet.


Return precautions given.





Disposition





- Clinical Impression


Clinical Impression: 


 Gastroenteritis








- Patient ED Disposition


Is Patient to be Admitted: No


Counseled Patient/Family Regarding: Studies Performed, Diagnosis, Need For 

Followup





- Disposition


Referrals: 


Fazal Lux MD [Medical Doctor] - 


Disposition: Routine/Home


Disposition Time: 04:58


Condition: IMPROVED


Prescriptions: 


Dicyclomine [Bentyl] 20 mg PO TID PRN #15 tab


 PRN Reason: Pain, Mild (1-3)


Ondansetron ODT [Zofran ODT] 4 mg PO Q8 PRN #10 odt


 PRN Reason: Nausea/Vomiting


Instructions:  Gastroenteritis (ED)

## 2018-04-05 NOTE — RAD
HISTORY:

Cough.



COMPARISON:

No prior.



TECHNIQUE:

Chest PA and lateral



FINDINGS:



LUNGS:

Hyperinflation ; findings could be secondary to COPD or emphysema. No 

acute consolidation.  Mild biapical pleural thickening 



PLEURA:

No significant pleural effusion identified. No pneumothorax apparent.



CARDIOVASCULAR:

Normal.



OSSEOUS STRUCTURES:

No significant abnormalities.



VISUALIZED UPPER ABDOMEN:

Normal.



OTHER FINDINGS:

None.



IMPRESSION:

Hyperinflation; rule out COPD or emphysema. 



No acute consolidation. 



Biapical pleural thickening.

## 2018-05-09 ENCOUNTER — HOSPITAL ENCOUNTER (EMERGENCY)
Dept: HOSPITAL 14 - H.ER | Age: 63
Discharge: HOME | End: 2018-05-09
Payer: MEDICARE

## 2018-05-09 VITALS
OXYGEN SATURATION: 97 % | HEART RATE: 82 BPM | RESPIRATION RATE: 18 BRPM | SYSTOLIC BLOOD PRESSURE: 126 MMHG | DIASTOLIC BLOOD PRESSURE: 74 MMHG

## 2018-05-09 VITALS — TEMPERATURE: 98 F

## 2018-05-09 VITALS — BODY MASS INDEX: 18.1 KG/M2

## 2018-05-09 DIAGNOSIS — K52.9: Primary | ICD-10-CM

## 2018-05-09 DIAGNOSIS — I10: ICD-10-CM

## 2018-05-09 DIAGNOSIS — G89.29: ICD-10-CM

## 2018-05-09 DIAGNOSIS — K85.90: ICD-10-CM

## 2018-05-09 DIAGNOSIS — F32.9: ICD-10-CM

## 2018-05-09 DIAGNOSIS — F41.9: ICD-10-CM

## 2018-05-09 LAB
ALBUMIN SERPL-MCNC: 4.4 G/DL (ref 3.5–5)
ALBUMIN/GLOB SERPL: 1.5 {RATIO} (ref 1–2.1)
ALT SERPL-CCNC: 31 U/L (ref 21–72)
AST SERPL-CCNC: 32 U/L (ref 17–59)
BASOPHILS # BLD AUTO: 0 K/UL (ref 0–0.2)
BASOPHILS NFR BLD: 0.6 % (ref 0–2)
BILIRUBIN,DIRECT: 0.4 MG/ML (ref 0–0.4)
BUN SERPL-MCNC: 18 MG/DL (ref 9–20)
CALCIUM SERPL-MCNC: 9.9 MG/DL (ref 8.4–10.2)
EOSINOPHIL # BLD AUTO: 0.1 K/UL (ref 0–0.7)
EOSINOPHIL NFR BLD: 2.2 % (ref 0–4)
ERYTHROCYTE [DISTWIDTH] IN BLOOD BY AUTOMATED COUNT: 14.2 % (ref 11.5–14.5)
GFR NON-AFRICAN AMERICAN: > 60
HGB BLD-MCNC: 14.3 G/DL (ref 12–18)
LIPASE SERPL-CCNC: 440 U/L (ref 23–300)
LYMPHOCYTES # BLD AUTO: 1.9 K/UL (ref 1–4.3)
LYMPHOCYTES NFR BLD AUTO: 34 % (ref 20–40)
MCH RBC QN AUTO: 32.5 PG (ref 27–31)
MCHC RBC AUTO-ENTMCNC: 34.8 G/DL (ref 33–37)
MCV RBC AUTO: 93.4 FL (ref 80–94)
MONOCYTES # BLD: 0.4 K/UL (ref 0–0.8)
MONOCYTES NFR BLD: 7.6 % (ref 0–10)
NEUTROPHILS # BLD: 3.1 K/UL (ref 1.8–7)
NEUTROPHILS NFR BLD AUTO: 55.6 % (ref 50–75)
NRBC BLD AUTO-RTO: 0.2 % (ref 0–0)
PLATELET # BLD: 209 K/UL (ref 130–400)
PMV BLD AUTO: 8.3 FL (ref 7.2–11.7)
RBC # BLD AUTO: 4.4 MIL/UL (ref 4.4–5.9)
WBC # BLD AUTO: 5.6 K/UL (ref 4.8–10.8)

## 2018-05-09 PROCEDURE — 82948 REAGENT STRIP/BLOOD GLUCOSE: CPT

## 2018-05-09 PROCEDURE — 85025 COMPLETE CBC W/AUTO DIFF WBC: CPT

## 2018-05-09 PROCEDURE — 83690 ASSAY OF LIPASE: CPT

## 2018-05-09 PROCEDURE — 96375 TX/PRO/DX INJ NEW DRUG ADDON: CPT

## 2018-05-09 PROCEDURE — 96361 HYDRATE IV INFUSION ADD-ON: CPT

## 2018-05-09 PROCEDURE — 99283 EMERGENCY DEPT VISIT LOW MDM: CPT

## 2018-05-09 PROCEDURE — 96374 THER/PROPH/DIAG INJ IV PUSH: CPT

## 2018-05-09 PROCEDURE — 80076 HEPATIC FUNCTION PANEL: CPT

## 2018-05-09 PROCEDURE — 80048 BASIC METABOLIC PNL TOTAL CA: CPT

## 2018-05-09 NOTE — ED PDOC
HPI: Abdomen


Time Seen by Provider: 18 02:39


Chief Complaint (Nursing): Abdominal Pain


History Per: Patient


History/Exam Limitations: no limitations


Current Symptoms Are (Timing): Still Present


Location Of Pain/Discomfort: Epigastric


Quality Of Discomfort: Sharp


Associated Symptoms: Nausea, Vomiting, Diarrhea, Back Pain, Constipation.  

denies: Fever, Chills, Loss Of Appetite, Chest Pain


Exacerbating Factors: Food


Additional Complaint(s): 





Patient well known to ER with history of anxiety and pancreatitis presenting 

with abdominal pain, vomiting, diarrhea, states he's had multiple episodes of 

watery diarrhea and nonbloody, nonbilious vomiting today associated with 

epigastric pain radiating to his back, which he states he's had many times in 

the past.  Denies fevers, cough, recent travel.  Patient states he has not had 

alcohol in 2 years.  States he has a list of foods he knows he's not supposed 

to eat but may have accidentally consumed one of them today.





Past Medical History


Vital Signs: 


 Last Vital Signs











Temp  98.0 F   18 02:36


 


Pulse  87   18 02:36


 


Resp  16   18 02:36


 


BP  121/68   18 02:36


 


Pulse Ox  98   18 03:34














- Medical History


PMH: Anxiety, Depression, HTN, Pancreatitis (Recurrent), Chronic Pain (abdominal

)


   Denies: HIV, Chronic Kidney Disease





- Family History


Family History: States: Unknown Family Hx





- Immunization History


Hx Tetanus Toxoid Vaccination: Yes


Hx Influenza Vaccination: Yes


Hx Pneumococcal Vaccination: Yes





- Home Medications


Home Medications: 


 Ambulatory Orders











 Medication  Instructions  Recorded


 


Alprazolam [Xanax] 0.5 mg PO TID 16


 


Paroxetine HCl [Paxil] 20 mg PO DAILY 16


 


Amoxicillin/Clavulanate [Augmentin 1 tab PO Q12 #14 tab 18





875 MG-125 MG]  


 


Azithromycin [Zithromax Tri-Panchito] 500 mg PO DAILY #5 tablet 18


 


Dicyclomine [Bentyl] 20 mg PO TID PRN #15 tab 18


 


Ondansetron ODT [Zofran ODT] 4 mg PO Q8 PRN #10 odt 18


 


Dicyclomine [Bentyl] 20 mg PO BID #30 tab 18


 


Ondansetron ODT [Zofran ODT] 4 mg PO Q8 PRN #12 odt 18














- Allergies


Allergies/Adverse Reactions: 


 Allergies











Allergy/AdvReac Type Severity Reaction Status Date / Time


 


No Known Allergies Allergy   Verified 18 02:26














Review of Systems


ROS Statement: Except As Marked, All Systems Reviewed And Found Negative


Gastrointestinal: Positive for: Nausea, Vomiting, Abdominal Pain, Diarrhea





Physical Exam





- Reviewed


Nursing Documentation Reviewed: Yes


Vital Signs Reviewed: Yes





- Physical Exam


Appears: Positive for: Well, Non-toxic, No Acute Distress


Head Exam: Positive for: ATRAUMATIC, NORMAL INSPECTION, NORMOCEPHALIC


Skin: Positive for: Normal Color, Warm, DRY


Eye Exam: Positive for: EOMI, Normal appearance, PERRL


ENT: Positive for: Normal ENT Inspection


Neck: Positive for: Normal, Painless ROM


Cardiovascular/Chest: Positive for: Regular Rate, Rhythm


Respiratory: Positive for: CNT, Normal Breath Sounds


Gastrointestinal/Abdominal: Positive for: Normal Exam, Bowel Sounds, Soft, 

Tenderness (Mild tenderness in epigastrium to palpation ).  Negative for: 

Organomegaly, Mass, Distended, Guarding, Rebound, Hernia, Asicites


Back: Positive for: Normal Inspection


Extremity: Positive for: Normal ROM


Neurologic/Psych: Positive for: Alert, Oriented





- Laboratory Results


Result Diagrams: 


 18 02:50





 18 02:50





- ECG


O2 Sat by Pulse Oximetry: 98


Pulse Ox Interpretation: Normal





Medical Decision Making


Medical Decision MakinAM


A/P: Hx of depression, pancreatitis presenting with nausea, vomiting, abdominal 

pain


-patient's presentation most consistent with gastroenteritis, will also workup 

for pancreatitis


-not concerned for PNA, aortic/cardiac involvement given history and PE


-will check labs, provide fluids, anti-emetic, low dose opiate and re-eval





325AM


-lipase ~400, not significant for pancreatitis


-patient feeling well, tolerating PO, well appearing


-advised to be wary of diet 








Disposition





- Clinical Impression


Clinical Impression: 


 Gastroenteritis








- Patient ED Disposition


Is Patient to be Admitted: No





- Disposition


Referrals: 


Rodriguez Shanks MD, PhD [Staff Provider] - 


Disposition: Routine/Home


Disposition Time: 03:33


Condition: IMPROVED


Prescriptions: 


Dicyclomine [Bentyl] 20 mg PO BID #30 tab


Ondansetron ODT [Zofran ODT] 4 mg PO Q8 PRN #12 odt


 PRN Reason: Nausea/Vomiting


Instructions:  Gastroenteritis (ED)


Forms:  [facility] (English)

## 2018-05-27 ENCOUNTER — HOSPITAL ENCOUNTER (EMERGENCY)
Dept: HOSPITAL 14 - H.ER | Age: 63
Discharge: HOME | End: 2018-05-27
Payer: MEDICARE

## 2018-05-27 VITALS — BODY MASS INDEX: 18.1 KG/M2

## 2018-05-27 VITALS
OXYGEN SATURATION: 97 % | TEMPERATURE: 98.1 F | DIASTOLIC BLOOD PRESSURE: 84 MMHG | HEART RATE: 73 BPM | RESPIRATION RATE: 16 BRPM | SYSTOLIC BLOOD PRESSURE: 138 MMHG

## 2018-05-27 DIAGNOSIS — M54.2: ICD-10-CM

## 2018-05-27 DIAGNOSIS — M54.9: Primary | ICD-10-CM

## 2018-05-27 PROCEDURE — 99283 EMERGENCY DEPT VISIT LOW MDM: CPT

## 2018-05-27 PROCEDURE — 96372 THER/PROPH/DIAG INJ SC/IM: CPT
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
## 2018-07-12 ENCOUNTER — HOSPITAL ENCOUNTER (INPATIENT)
Dept: HOSPITAL 14 - H.ER | Age: 63
LOS: 2 days | Discharge: HOME | DRG: 440 | End: 2018-07-14
Attending: FAMILY MEDICINE | Admitting: FAMILY MEDICINE
Payer: MEDICARE

## 2018-07-12 VITALS — BODY MASS INDEX: 18.1 KG/M2

## 2018-07-12 DIAGNOSIS — K52.9: ICD-10-CM

## 2018-07-12 DIAGNOSIS — K85.90: Primary | ICD-10-CM

## 2018-07-12 DIAGNOSIS — G89.29: ICD-10-CM

## 2018-07-12 DIAGNOSIS — Z79.899: ICD-10-CM

## 2018-07-12 DIAGNOSIS — F32.9: ICD-10-CM

## 2018-07-12 DIAGNOSIS — I10: ICD-10-CM

## 2018-07-12 DIAGNOSIS — Z87.891: ICD-10-CM

## 2018-07-12 DIAGNOSIS — F41.9: ICD-10-CM

## 2018-07-12 DIAGNOSIS — F45.9: ICD-10-CM

## 2018-07-12 DIAGNOSIS — K86.1: ICD-10-CM

## 2018-07-12 DIAGNOSIS — R74.8: ICD-10-CM

## 2018-07-12 LAB
BASOPHILS # BLD AUTO: 0.1 K/UL (ref 0–0.2)
BASOPHILS NFR BLD: 1.1 % (ref 0–2)
EOSINOPHIL # BLD AUTO: 0.1 K/UL (ref 0–0.7)
EOSINOPHIL NFR BLD: 0.9 % (ref 0–4)
ERYTHROCYTE [DISTWIDTH] IN BLOOD BY AUTOMATED COUNT: 14.2 % (ref 11.5–14.5)
HGB BLD-MCNC: 14 G/DL (ref 12–18)
LYMPHOCYTES # BLD AUTO: 1.5 K/UL (ref 1–4.3)
LYMPHOCYTES NFR BLD AUTO: 23.2 % (ref 20–40)
MCH RBC QN AUTO: 32.2 PG (ref 27–31)
MCHC RBC AUTO-ENTMCNC: 34.5 G/DL (ref 33–37)
MCV RBC AUTO: 93.2 FL (ref 80–94)
MONOCYTES # BLD: 0.5 K/UL (ref 0–0.8)
MONOCYTES NFR BLD: 7.8 % (ref 0–10)
NEUTROPHILS # BLD: 4.5 K/UL (ref 1.8–7)
NEUTROPHILS NFR BLD AUTO: 67 % (ref 50–75)
NRBC BLD AUTO-RTO: 0 % (ref 0–0)
PLATELET # BLD: 236 K/UL (ref 130–400)
PMV BLD AUTO: 8.1 FL (ref 7.2–11.7)
RBC # BLD AUTO: 4.36 MIL/UL (ref 4.4–5.9)
WBC # BLD AUTO: 6.7 K/UL (ref 4.8–10.8)

## 2018-07-12 NOTE — ED PDOC
HPI: Abdomen


Time Seen by Provider: 07/12/18 21:28


Chief Complaint (Nursing): Abdominal Pain


Chief Complaint (Provider): abdominal pain


History Per: Patient


History/Exam Limitations: no limitations


Onset/Duration Of Symptoms: Hrs


Current Symptoms Are (Timing): Still Present


Location Of Pain/Discomfort: Diffuse


Quality Of Discomfort: "Pain"


Associated Symptoms: Nausea


Additional Complaint(s): 





63 y/o male history of pancreatitis, depression presents for evaluation of 

abdominal pain with associated nausea x 10 hours.  States similar similar to 

previous pancreatitis flare-ups. Denies fever, chest pain, shortness of breath, 

palpitations, changes in bowel movements, urinary symptoms. 





Past Medical History


Reviewed: Historical Data, Nursing Documentation, Vital Signs


Vital Signs: 


 Last Vital Signs











Temp  98.5 F   07/12/18 21:21


 


Pulse  86   07/12/18 21:21


 


Resp  21   07/12/18 21:21


 


BP  148/88   07/12/18 21:21


 


Pulse Ox  97   07/13/18 05:27














- Medical History


PMH: Anxiety, Depression, HTN, Pancreatitis (Recurrent), Chronic Pain


   Denies: HIV, Chronic Kidney Disease





- Surgical History


Surgical History: No Surg Hx





- Family History


Family History: States: Unknown Family Hx





- Immunization History


Hx Tetanus Toxoid Vaccination: Yes


Hx Influenza Vaccination: Yes


Hx Pneumococcal Vaccination: Yes





- Home Medications


Home Medications: 


 Ambulatory Orders











 Medication  Instructions  Recorded


 


Alprazolam [Xanax] 0.5 mg PO TID 07/13/18


 


Paroxetine HCl [Paxil] 40 mg PO DAILY 07/13/18














- Allergies


Allergies/Adverse Reactions: 


 Allergies











Allergy/AdvReac Type Severity Reaction Status Date / Time


 


No Known Allergies Allergy   Verified 05/27/18 18:42














Review of Systems


ROS Statement: Except As Marked, All Systems Reviewed And Found Negative


Gastrointestinal: Positive for: Nausea, Abdominal Pain





Physical Exam





- Reviewed


Nursing Documentation Reviewed: Yes


Vital Signs Reviewed: Yes





- Physical Exam


Appears: Positive for: Well, Non-toxic, No Acute Distress


Head Exam: Positive for: ATRAUMATIC, NORMAL INSPECTION, NORMOCEPHALIC


Skin: Positive for: Normal Color


Eye Exam: Positive for: Normal appearance


ENT: Positive for: Normal ENT Inspection


Cardiovascular/Chest: Positive for: Regular Rate, Rhythm


Respiratory: Positive for: Normal Breath Sounds


Gastrointestinal/Abdominal: Positive for: Bowel Sounds, Soft, Tenderness (

diffuse)


Back: Positive for: Normal Inspection


Extremity: Positive for: Normal ROM


Neurologic/Psych: Positive for: Alert, Oriented





- Laboratory Results


Result Diagrams: 


 07/12/18 22:40





 07/13/18 02:21





- ECG


ECG: Positive for: Viewed By Me (reviewed by ED attending)


ECG Rhythm: Positive for: Sinus Rhythm


O2 Sat by Pulse Oximetry: 97





- Progress


ED Course And Treament: 





labs, IV fluids, IV morphine





Lipase 3565


Patient still complaining of pain.  IV dilaudid, IV LR bolus, CT abd/pelvis 

ordered


NPO





Case discussed with Dr. Arechiga, medical service on-call, for admission








Disposition





- Clinical Impression


Clinical Impression: 


 Acute pancreatitis








- Patient ED Disposition


Is Patient to be Admitted: Yes





- Disposition


Disposition Time: 05:27


Condition: FAIR

## 2018-07-13 LAB
ALBUMIN SERPL-MCNC: 4.2 G/DL (ref 3.5–5)
ALBUMIN/GLOB SERPL: 1.5 {RATIO} (ref 1–2.1)
ALT SERPL-CCNC: 22 U/L (ref 21–72)
AST SERPL-CCNC: 29 U/L (ref 17–59)
BILIRUB UR-MCNC: NEGATIVE MG/DL
BUN SERPL-MCNC: 15 MG/DL (ref 9–20)
CALCIUM SERPL-MCNC: 9.3 MG/DL (ref 8.4–10.2)
COLOR UR: YELLOW
GFR NON-AFRICAN AMERICAN: > 60
GLUCOSE UR STRIP-MCNC: (no result) MG/DL
HDLC SERPL-MCNC: 39 MG/DL (ref 30–70)
LDLC SERPL-MCNC: 108 MG/DL (ref 0–129)
LEUKOCYTE ESTERASE UR-ACNC: (no result) LEU/UL
LIPASE SERPL-CCNC: 3565 U/L (ref 23–300)
PH UR STRIP: 5 [PH] (ref 5–8)
PROT UR STRIP-MCNC: NEGATIVE MG/DL
RBC # UR STRIP: NEGATIVE /UL
SP GR UR STRIP: 1.02 (ref 1–1.03)
URINE CLARITY: (no result)
URINE HYALINE CAST: (no result) /HPF (ref 0–2)
UROBILINOGEN UR-MCNC: (no result) MG/DL (ref 0.2–1)

## 2018-07-13 RX ADMIN — METRONIDAZOLE SCH MLS/HR: 500 INJECTION, SOLUTION INTRAVENOUS at 19:02

## 2018-07-13 RX ADMIN — CIPROFLOXACIN SCH MLS/HR: 2 INJECTION, SOLUTION INTRAVENOUS at 20:11

## 2018-07-13 RX ADMIN — CIPROFLOXACIN SCH MLS/HR: 2 INJECTION, SOLUTION INTRAVENOUS at 14:55

## 2018-07-13 RX ADMIN — METRONIDAZOLE SCH MLS/HR: 500 INJECTION, SOLUTION INTRAVENOUS at 14:55

## 2018-07-13 RX ADMIN — HYDROMORPHONE HYDROCHLORIDE STA MG: 1 INJECTION, SOLUTION INTRAMUSCULAR; INTRAVENOUS; SUBCUTANEOUS at 03:30

## 2018-07-13 RX ADMIN — HYDROMORPHONE HYDROCHLORIDE STA MG: 1 INJECTION, SOLUTION INTRAMUSCULAR; INTRAVENOUS; SUBCUTANEOUS at 08:00

## 2018-07-13 NOTE — CP.PCM.HP
<Ary Hardy - Last Filed: 07/13/18 14:24>





History of Present Illness





- History of Present Illness


History of Present Illness: 








63 yo M with PM pancreatitis (many times), depression and anxiety, former EtOH 

abuse admitted due to acute pancreatitis. Pt presented to ED for evaluation of 

abdominal pain and nausea x 10 hours.  


Today  when seen on rounds, stated that this was similar to past flare-ups. 

Denies drinking any alcohol, states he has been sober since Sept 2016. Only 

medications are paxil and xanax.


Denies fever, chest pain, shortness of breath, palpitations, changes in bowel 

movements, urinary symptoms.





Elevated lipase 3000s in ED.


CT showed signs of enteritis. 





Present on Admission





- Present on Admission


Any Indicators Present on Admission: No





Review of Systems





- Review of Systems


All systems: reviewed and no additional remarkable complaints except (as per HPI

)





Past Patient History





- Infectious Disease


Hx of Infectious Diseases: None





- Past Medical History & Family History


Past Medical History?: Yes





- Past Social History


Smoking Status: Light Smoker < 10 Cigarettes Daily


Alcohol: Other (states former drinker, not any longer)





- CARDIAC


Hx Cardiac Disorders: No





- PULMONARY


Hx Respiratory Disorders: No





- NEUROLOGICAL


Hx Neurological Disorder: No





- HEENT


Hx HEENT Problems: No





- RENAL


Hx Chronic Kidney Disease: No





- ENDOCRINE/METABOLIC


Hx Endocrine Disorders: No





- HEMATOLOGICAL/ONCOLOGICAL


Hx Human Immunodeficiency Virus (HIV): No





- INTEGUMENTARY


Hx Dermatological Problems: No





- MUSCULOSKELETAL/RHEUMATOLOGICAL


Hx Musculoskeletal Disorders: No


Hx Falls: No





- GASTROINTESTINAL


Hx Pancreatitis: Yes (Recurrent)





- GENITOURINARY/GYNECOLOGICAL


Hx Genitourinary Disorders: No





- PSYCHIATRIC


Hx Psychophysiologic Disorder: Yes


Hx Anxiety: Yes


Hx Depression: Yes


Hx Substance Use: Yes





- SURGICAL HISTORY


Hx Surgeries: No





- ANESTHESIA


Hx Anesthesia: No


Hx Anesthesia Reactions: No


Hx Malignant Hyperthermia: No





Meds


Allergies/Adverse Reactions: 


 Allergies











Allergy/AdvReac Type Severity Reaction Status Date / Time


 


No Known Allergies Allergy   Verified 05/27/18 18:42














Physical Exam





- Constitutional


Additional comments: 





appears uncomfortable





- Eye Exam


Eye Exam: Normal appearance





- Respiratory Exam


Respiratory Exam: NORMAL BREATHING PATTERN.  absent: Respiratory Distress





- Cardiovascular Exam


Cardiovascular Exam: REGULAR RHYTHM





- GI/Abdominal Exam


GI & Abdominal Exam: Normal Bowel Sounds, Tenderness (epigastric)





- Extremities Exam


Extremities exam: Negative for: calf tenderness





- Neurological Exam


Neurological exam: Alert, Oriented x3





- Psychiatric Exam


Psychiatric exam: Normal Mood





- Skin


Skin Exam: Normal Color, Warm





Results





- Vital Signs


Recent Vital Signs: 





 Last Vital Signs











Temp  97.9 F   07/13/18 09:49


 


Pulse  69   07/13/18 09:49


 


Resp  18   07/13/18 10:30


 


BP  126/76   07/13/18 09:49


 


Pulse Ox  95   07/13/18 09:49














- Labs


Result Diagrams: 


 07/12/18 22:40





 07/13/18 02:21


Labs: 





 Laboratory Results - last 24 hr











  07/12/18 07/13/18 07/13/18





  22:40 00:18 02:21


 


WBC  6.7  


 


RBC  4.36 L  


 


Hgb  14.0  


 


Hct  40.7  


 


MCV  93.2  


 


MCH  32.2 H  


 


MCHC  34.5  


 


RDW  14.2  


 


Plt Count  236  


 


MPV  8.1  


 


Neut % (Auto)  67.0  


 


Lymph % (Auto)  23.2  


 


Mono % (Auto)  7.8  


 


Eos % (Auto)  0.9  


 


Baso % (Auto)  1.1  


 


Neut # (Auto)  4.5  


 


Lymph # (Auto)  1.5  


 


Mono # (Auto)  0.5  


 


Eos # (Auto)  0.1  


 


Baso # (Auto)  0.1  


 


Sodium    143


 


Potassium    4.9


 


Chloride    108 H


 


Carbon Dioxide    26


 


Anion Gap    14


 


BUN    15


 


Creatinine    0.9


 


Est GFR ( Amer)    > 60


 


Est GFR (Non-Af Amer)    > 60


 


Random Glucose    90


 


Calcium    9.3


 


Total Bilirubin    0.7


 


AST    29


 


ALT    22


 


Alkaline Phosphatase    77


 


Total Protein    7.1


 


Albumin    4.2


 


Globulin    2.9


 


Albumin/Globulin Ratio    1.5


 


Lipase    3565 H


 


Urine Color   Yellow 


 


Urine Clarity   Slighty-cloudy 


 


Urine pH   5.0 


 


Ur Specific Gravity   1.019 


 


Urine Protein   Negative 


 


Urine Glucose (UA)   Neg 


 


Urine Ketones   Negative 


 


Urine Blood   Negative 


 


Urine Nitrate   Negative 


 


Urine Bilirubin   Negative 


 


Urine Urobilinogen   0.2-1.0 


 


Ur Leukocyte Esterase   Neg 


 


Urine RBC (Auto)   6 H 


 


Urine Microscopic WBC   4 


 


Hyaline Casts   0-2 














Assessment & Plan


(1) Acute on chronic pancreatitis


Status: Acute   Priority: High   





(2) Enteritis


Status: Acute   





(3) History of ETOH abuse


Status: Chronic   





(4) Depression


Status: Chronic   





(5) Anxiety


Status: Chronic   





- Assessment and Plan (Free Text)


Plan: 





- Admitted to [facility]


- NPO, LR @ 200 ml/hr, PPI


- pain control


- CBC, Ca, Lipid panel, CMP, lipase, amylase tomorrow


- GI consult - Dr. Shanks 


- Cipro/Flagyl for enteritis on CT


- Rest of plan as ordered








<Shaun Arechiga - Last Filed: 07/14/18 07:59>





Results





- Vital Signs


Recent Vital Signs: 





 Last Vital Signs











Temp  97.9 F   07/14/18 01:00


 


Pulse  62   07/14/18 01:00


 


Resp  20   07/14/18 01:00


 


BP  148/73   07/14/18 01:00


 


Pulse Ox  96   07/14/18 01:00














- Labs


Result Diagrams: 


 07/14/18 05:30





 07/14/18 05:30


Labs: 





 Laboratory Results - last 24 hr











  07/13/18 07/14/18 07/14/18





  12:27 05:30 05:30


 


WBC   5.8 


 


RBC   4.27 L 


 


Hgb   13.6 


 


Hct   40.7 


 


MCV   95.5 H D 


 


MCH   31.9 H 


 


MCHC   33.5 


 


RDW   14.1 


 


Plt Count   180 


 


Sodium    140


 


Potassium    4.2


 


Chloride    106


 


Carbon Dioxide    23


 


Anion Gap    15


 


BUN    16


 


Creatinine    0.7 L


 


Est GFR ( Amer)    > 60


 


Est GFR (Non-Af Amer)    > 60


 


Random Glucose    68 L


 


Calcium    9.2


 


Total Bilirubin    0.6


 


AST    30


 


ALT    29


 


Alkaline Phosphatase    76


 


Total Protein    6.6


 


Albumin    3.8


 


Globulin    2.7


 


Albumin/Globulin Ratio    1.4


 


Triglycerides  80  


 


Cholesterol  177  


 


LDL Cholesterol Direct  108  


 


HDL Cholesterol  39  


 


Amylase    342 H D


 


Lipase    571 H














Assessment & Plan





- Assessment and Plan (Free Text)


Plan: 





I was present during evaluation and discussed with Dr Hardy re plans of care 

and tx.





Shaun Arechiga M.D.

## 2018-07-13 NOTE — CT
Date of service: 



07/13/2018



PROCEDURE:  CT Abdomen and Pelvis with contrast



HISTORY:

abd pain, elevated lipase



COMPARISON:

Comparison is made with 03/29/2018



TECHNIQUE:

Contrast dose: 95 cc of Omnipaque 300.



Axial and reformatted coronal and sagittal CT images of the abdomen 

and pelvis were obtained after IV contrast administration



Radiation dose:



Total exam DLP = 220.24 mGy-cm.



This CT exam was performed using one or more of the following dose 

reduction techniques: Automated exposure control, adjustment of the 

mA and/or kV according to patient size, and/or use of iterative 

reconstruction technique.



FINDINGS:



LOWER THORAX:

Mild emphysematous changes are noted at the visualized portion of the 

lungs. No evidence of pleural effusion or cardiomegaly. 



LIVER:

Mild hepatomegaly is noted. No gross lesion or ductal dilatation. 



GALLBLADDER AND BILE DUCTS:

Dense of acute cholecystitis 



PANCREAS:

Unremarkable. No gross lesion or ductal dilatation.



SPLEEN:

Unremarkable. 



ADRENALS:

Diffuse bilateral enlargement of the adrenal glands is again noted 

likely represent adrenal hyperplasia versus bilateral benign 

adenomas.  Findings have not significantly changed since the previous 

exam.



KIDNEYS AND URETERS:

Unremarkable. No hydronephrosis. No solid mass. 



VASCULATURE:

Unremarkable. No aortic aneurysm. 



BOWEL:

Small bowel loops are slightly dilated. Fluid density stool is also 

noted in the large bowel. The possibility of enteritis or less likely 

bowel ileus cannot be excluded. No evidence of significant large 

bowel wall thickening.



APPENDIX:

Normal appendix. 



PERITONEUM:

Unremarkable. No free fluid. No free air. 



LYMPH NODES:

Unremarkable. No enlarged lymph nodes. 



BLADDER:

Diffuse urinary bladder wall thickening is noted. 



REPRODUCTIVE:

The prostate is mildly enlarged. 



BONES:

No acute fracture. 



OTHER FINDINGS:

None.



IMPRESSION:

Mildly dilated small bowel loops and fluid density stool in the small 

and large bowel.  Findings may represent enteritis and diarrhea 

versus mild small bowel ileus.



Mild to moderate circumferential urinary bladder wall thickening. 



Otherwise no evidence of acute pathology in the abdomen and pelvis.



Preliminary report was submitted by virtual Radiology.

## 2018-07-13 NOTE — RAD
Date of service: 



07/13/2018



HISTORY:

admit  



COMPARISON:

Comparison is made with 04/05/2018 



FINDINGS:



LUNGS:

No active pulmonary disease.



PLEURA:

No significant pleural effusion identified, no pneumothorax apparent.



CARDIOVASCULAR:

Normal.



OSSEOUS STRUCTURES:

No significant abnormalities.



VISUALIZED UPPER ABDOMEN:

Normal.



OTHER FINDINGS:

None.



IMPRESSION:

No active disease.

## 2018-07-13 NOTE — US
Date of service: 



07/13/2018



HISTORY:

pancreatitis



COMPARISON:

None.



TECHNIQUE:

Sonographic evaluation of the abdomen.



FINDINGS:



LIVER:

Measures 13.9 cm.  Normal echogenicity of the liver parenchyma. No 

mass. No intrahepatic bile duct dilatation.



GALLBLADDER:

Unremarkable. No gallstones.



COMMON BILE DUCT:

Measures 4 mm. No stones. No dilatation.



PANCREAS:

Unremarkable as visualized. No mass. No ductal dilatation.



RIGHT KIDNEY:

Measures 10.4 x 4.4 x 4.6cm. Normal echogenicity. No calculus, mass, 

or hydronephrosis.



LEFT KIDNEY:

Measures 11.5 x 4.7 x 5.1cm. Normal echogenicity. No calculus, mass, 

or hydronephrosis.



SPLEEN:

Normal in size and contour. No mass.



AORTA:

No aneurysmal dilatation. 



IVC:

Unremarkable. 



OTHER FINDINGS:

None. 



IMPRESSION:

Unremarkable abdominal sonogram.

## 2018-07-14 VITALS
OXYGEN SATURATION: 98 % | TEMPERATURE: 97.7 F | HEART RATE: 57 BPM | SYSTOLIC BLOOD PRESSURE: 135 MMHG | DIASTOLIC BLOOD PRESSURE: 72 MMHG | RESPIRATION RATE: 18 BRPM

## 2018-07-14 LAB
ALBUMIN SERPL-MCNC: 3.8 G/DL (ref 3.5–5)
ALBUMIN/GLOB SERPL: 1.4 {RATIO} (ref 1–2.1)
ALT SERPL-CCNC: 29 U/L (ref 21–72)
AMYLASE SERPL-CCNC: 342 U/L (ref 30–110)
AST SERPL-CCNC: 30 U/L (ref 17–59)
BUN SERPL-MCNC: 16 MG/DL (ref 9–20)
CALCIUM SERPL-MCNC: 9.2 MG/DL (ref 8.4–10.2)
ERYTHROCYTE [DISTWIDTH] IN BLOOD BY AUTOMATED COUNT: 14.1 % (ref 11.5–14.5)
GFR NON-AFRICAN AMERICAN: > 60
HGB BLD-MCNC: 13.6 G/DL (ref 12–18)
LIPASE SERPL-CCNC: 571 U/L (ref 23–300)
MCH RBC QN AUTO: 31.9 PG (ref 27–31)
MCHC RBC AUTO-ENTMCNC: 33.5 G/DL (ref 33–37)
MCV RBC AUTO: 95.5 FL (ref 80–94)
PLATELET # BLD: 180 K/UL (ref 130–400)
RBC # BLD AUTO: 4.27 MIL/UL (ref 4.4–5.9)
WBC # BLD AUTO: 5.8 K/UL (ref 4.8–10.8)

## 2018-07-14 RX ADMIN — METRONIDAZOLE SCH MLS/HR: 500 INJECTION, SOLUTION INTRAVENOUS at 08:29

## 2018-07-14 RX ADMIN — METRONIDAZOLE SCH MLS/HR: 500 INJECTION, SOLUTION INTRAVENOUS at 00:02

## 2018-07-14 RX ADMIN — CIPROFLOXACIN SCH MLS/HR: 2 INJECTION, SOLUTION INTRAVENOUS at 08:27

## 2018-07-14 NOTE — CP.PCM.PN
Subjective





- Date & Time of Evaluation


Date of Evaluation: 07/14/18


Time of Evaluation: 07:59





Objective





- Vital Signs/Intake and Output


Vital Signs (last 24 hours): 


 











Temp Pulse Resp BP Pulse Ox


 


 97.9 F   62   20   148/73   96 


 


 07/14/18 01:00  07/14/18 01:00  07/14/18 01:00  07/14/18 01:00  07/14/18 01:00











- Medications


Medications: 


 Current Medications





Hydromorphone HCl (Dilaudid)  1 mg IVP Q3 PRN


   PRN Reason: Pain, severe (8-10)


   Last Admin: 07/13/18 11:05 Dose:  1 mg


Lactated Ringer's (Lactated Ringer's)  1,000 mls @ 200 mls/hr IV .Q5H KOBE


Ciprofloxacin (Cipro 400mg/200ml Dsw)  400 mg in 200 mls @ 200 mls/hr IVPB Q12 

KOBE


   PRN Reason: Protocol


   Last Admin: 07/13/18 20:11 Dose:  200 mls/hr


Metronidazole (Flagyl 500mg/100ml Ns)  100 mls @ 100 mls/hr IVPB Q8 KOBE


   PRN Reason: Protocol


   Last Admin: 07/14/18 00:02 Dose:  100 mls/hr


Ketorolac Tromethamine (Toradol)  30 mg IVP Q6 PRN


   PRN Reason: Pain, severe (8-10)


Morphine Sulfate (Morphine)  2 mg IVP Q4 PRN


   PRN Reason: Pain, severe (8-10)


   Last Admin: 07/14/18 04:15 Dose:  2 mg


Ondansetron HCl (Zofran Inj)  2 mg IVP Q4 PRN


   PRN Reason: Nausea/Vomiting


Pantoprazole Sodium (Protonix Inj)  40 mg IVP DAILY [facility]


   Last Admin: 07/13/18 14:54 Dose:  40 mg











- Labs


Labs: 


 





 07/14/18 05:30 





 07/14/18 05:30

## 2018-07-16 NOTE — CARD
--------------- APPROVED REPORT --------------





Date of service: 07/12/2018



EKG Measurement

Heart [id number]

CO [id number]

[id number]

[id number]

[id number]



<Conclusion>

Normal sinus rhythm

Minimal voltage criteria for LVH, may be normal variant

Borderline ECG

## 2018-07-16 NOTE — CON
DATE:  07/13/2018



REASON FOR CONSULTATION:  Elevated lipase and abdominal pain.



HISTORY OF PRESENT ILLNESS:  This is a 62-year-old male with a history of

pancreatitis in the past.  _____anxiety, former alcohol user who had _____

two years.  The patient _____ nausea, vomiting, abdominal pain, some loose

bowel movements as well.  Denies any drinking recently.  Currently lying in

bed comfortable, mild abdominal distress.



PAST MEDICAL HISTORY:  As above.



PAST SURGICAL HISTORY:  As above.



MEDICATIONS:  Have been reviewed.



REVIEW OF SYSTEMS:  All other systems have been reviewed and negative apart

from the HPI.



PHYSICAL EXAMINATION:

VITAL SIGNS:  Here in the hospital are grossly unremarkable.

GENERAL:  This is a pleasant middle-aged male, lying in bed comfortably,

and in no apparent distress.

HEENT:  Head is normocephalic and atraumatic.  Eyes:  Pupils are equal and

reactive to light bilaterally.  No conjunctival pallor or icterus.

NECK:  Supple.  Normal range of motion.  No lymphadenopathy appreciated.

LUNGS:  Coarse breath sounds bilaterally.

HEART:  S1 and S2, regular rate and rhythm.  No murmurs appreciated.

ABDOMEN:  Soft and nontender.  Bowel sounds are present.  Some discomfort. 

No rebound.  No guarding.

RECTAL:  Deferred.

EXTREMITIES:  Pulses present bilaterally.

SKIN:  Warm, dry and intact.

NEUROLOGIC:  A and O x3.



LABORATORY DATA:  All labs and radiology have been reviewed.  Labs include

a WBC of 4.7, hemoglobin 14.1 and hematocrit of 40.7.  Lipase is 3500.  CAT

scan shows some small bowel loops and likely enteritis, but no issues in

the pancreas.



ASSESSMENT AND PLAN:  This is a 62-year-old man with pancreatitis and

enteritis.  I suspect the lipase may be actually secondary to enteritis,

possible pancreatitis; however, CAT scan is grossly unremarkable.  We will get

ultrasound of the abdomen to rule out any gallstones.  Aggressive IV

hydration.  Clear liquid diet for now.  Pain control as needed.



Thank you for the consult.





__________________________________________

Rodriguez Shanks MD/ PhD





DD:  07/13/2018 17:03:02

DT:  07/13/2018 20:23:29

Job # [id number]

ROSETTE

## 2018-08-15 ENCOUNTER — HOSPITAL ENCOUNTER (EMERGENCY)
Dept: HOSPITAL 14 - H.ER | Age: 63
Discharge: HOME | End: 2018-08-15
Payer: MEDICARE

## 2018-08-15 VITALS
RESPIRATION RATE: 17 BRPM | TEMPERATURE: 97.6 F | DIASTOLIC BLOOD PRESSURE: 70 MMHG | HEART RATE: 70 BPM | SYSTOLIC BLOOD PRESSURE: 119 MMHG

## 2018-08-15 VITALS — BODY MASS INDEX: 21.5 KG/M2

## 2018-08-15 VITALS — OXYGEN SATURATION: 98 %

## 2018-08-15 DIAGNOSIS — I10: ICD-10-CM

## 2018-08-15 DIAGNOSIS — F41.9: ICD-10-CM

## 2018-08-15 DIAGNOSIS — K52.9: Primary | ICD-10-CM

## 2018-08-15 DIAGNOSIS — F32.9: ICD-10-CM

## 2018-08-15 DIAGNOSIS — G89.29: ICD-10-CM

## 2018-08-15 LAB
ALBUMIN SERPL-MCNC: 4.5 G/DL (ref 3.5–5)
ALBUMIN/GLOB SERPL: 1.6 {RATIO} (ref 1–2.1)
ALT SERPL-CCNC: 20 U/L (ref 21–72)
AST SERPL-CCNC: 29 U/L (ref 17–59)
BASOPHILS # BLD AUTO: 0 K/UL (ref 0–0.2)
BASOPHILS NFR BLD: 0.8 % (ref 0–2)
BUN SERPL-MCNC: 13 MG/DL (ref 9–20)
CALCIUM SERPL-MCNC: 9.4 MG/DL (ref 8.4–10.2)
EOSINOPHIL # BLD AUTO: 0.1 K/UL (ref 0–0.7)
EOSINOPHIL NFR BLD: 1.8 % (ref 0–4)
ERYTHROCYTE [DISTWIDTH] IN BLOOD BY AUTOMATED COUNT: 13.7 % (ref 11.5–14.5)
GFR NON-AFRICAN AMERICAN: > 60
HGB BLD-MCNC: 13.3 G/DL (ref 12–18)
LIPASE SERPL-CCNC: 152 U/L (ref 23–300)
LYMPHOCYTES # BLD AUTO: 1.6 K/UL (ref 1–4.3)
LYMPHOCYTES NFR BLD AUTO: 27.7 % (ref 20–40)
MCH RBC QN AUTO: 32.2 PG (ref 27–31)
MCHC RBC AUTO-ENTMCNC: 34 G/DL (ref 33–37)
MCV RBC AUTO: 94.7 FL (ref 80–94)
MONOCYTES # BLD: 0.6 K/UL (ref 0–0.8)
MONOCYTES NFR BLD: 9.8 % (ref 0–10)
NEUTROPHILS # BLD: 3.4 K/UL (ref 1.8–7)
NEUTROPHILS NFR BLD AUTO: 59.9 % (ref 50–75)
NRBC BLD AUTO-RTO: 0 % (ref 0–0)
PLATELET # BLD: 215 K/UL (ref 130–400)
PMV BLD AUTO: 8.2 FL (ref 7.2–11.7)
RBC # BLD AUTO: 4.13 MIL/UL (ref 4.4–5.9)
WBC # BLD AUTO: 5.7 K/UL (ref 4.8–10.8)

## 2018-08-15 PROCEDURE — 85025 COMPLETE CBC W/AUTO DIFF WBC: CPT

## 2018-08-15 PROCEDURE — 96374 THER/PROPH/DIAG INJ IV PUSH: CPT

## 2018-08-15 PROCEDURE — 83690 ASSAY OF LIPASE: CPT

## 2018-08-15 PROCEDURE — 96361 HYDRATE IV INFUSION ADD-ON: CPT

## 2018-08-15 PROCEDURE — 80053 COMPREHEN METABOLIC PANEL: CPT

## 2018-08-15 PROCEDURE — 96375 TX/PRO/DX INJ NEW DRUG ADDON: CPT

## 2018-08-15 PROCEDURE — 99283 EMERGENCY DEPT VISIT LOW MDM: CPT

## 2018-08-15 NOTE — ED PDOC
HPI: Abdomen


Time Seen by Provider: 08/15/18 01:15


Chief Complaint (Nursing): Abdominal Pain


Chief Complaint (Provider): Abdominal Pain


History Per: Patient


History/Exam Limitations: no limitations


Onset/Duration Of Symptoms: Hrs


Current Symptoms Are (Timing): Still Present


Location Of Pain/Discomfort: Epigastric


Associated Symptoms: Vomiting, Diarrhea


Additional Complaint(s): 


63 y/o male with a PMHx of pancreatitis presents to the ED complaining of 

abdominal pain associated with non-bloody, non-bilious vomiting and watery 

diarrhea, onset this morning. Patient denies taking any drugs or drinking 

alcohol. Of note, patient has not followed up with a PMD or Gastroenterologist 

as an outpatient. Denies fever and chills. 





PMD: No Provider





Past Medical History


Reviewed: Historical Data, Nursing Documentation, Vital Signs


Vital Signs: 


 Last Vital Signs











Temp  98.9 F   08/15/18 01:16


 


Pulse  91 H  08/15/18 01:16


 


Resp  18   08/15/18 01:16


 


BP  132/85   08/15/18 01:16


 


Pulse Ox  98   08/15/18 03:12














- Medical History


PMH: Anxiety, Depression, HTN, Pancreatitis (Recurrent), Chronic Pain


   Denies: HIV, Chronic Kidney Disease





- Surgical History


Surgical History: No Surg Hx





- Family History


Family History: States: Unknown Family Hx





- Immunization History


Hx Tetanus Toxoid Vaccination: Yes


Hx Influenza Vaccination: Yes


Hx Pneumococcal Vaccination: Yes





- Home Medications


Home Medications: 


 Ambulatory Orders











 Medication  Instructions  Recorded


 


Alprazolam [Xanax] 0.5 mg PO TID 06/11/18


 


Dicyclomine [Dicyclomine HCl] 10 mg PO TID PRN #15 cap 06/11/18


 


Paroxetine HCl [Paxil] 40 mg PO DAILY 06/11/18


 


Polyethylene Glycol 3350 [Miralax] 17 gm PO DAILY #270 ml 06/11/18


 


Alprazolam [Xanax] 0.5 mg PO TID 07/13/18


 


Paroxetine HCl [Paxil] 40 mg PO DAILY 07/13/18














- Allergies


Allergies/Adverse Reactions: 


 Allergies











Allergy/AdvReac Type Severity Reaction Status Date / Time


 


No Known Allergies Allergy   Unverified 06/11/18 16:35














Review of Systems


ROS Statement: Except As Marked, All Systems Reviewed And Found Negative


Constitutional: Negative for: Fever, Chills


Gastrointestinal: Positive for: Vomiting, Abdominal Pain, Diarrhea





Physical Exam





- Reviewed


Nursing Documentation Reviewed: Yes


Vital Signs Reviewed: Yes





- Physical Exam


Appears: Positive for: No Acute Distress


Head Exam: Positive for: ATRAUMATIC, NORMOCEPHALIC


Skin: Positive for: Normal Color, Warm, Dry


Eye Exam: Positive for: Normal appearance, EOMI, PERRL


Neck: Positive for: Normal, Painless ROM


Cardiovascular/Chest: Positive for: Regular Rate, Rhythm.  Negative for: Murmur


Respiratory: Positive for: Normal Breath Sounds.  Negative for: Respiratory 

Distress


Gastrointestinal/Abdominal: Positive for: Normal Exam, Soft, Tenderness (

epigastric tenderness to palpation)


Back: Positive for: Normal Inspection.  Negative for: L CVA Tenderness, R CVA 

Tenderness


Extremity: Positive for: Normal ROM.  Negative for: Pedal Edema, Deformity


Neurologic/Psych: Positive for: Alert, Oriented.  Negative for: Motor/Sensory 

Deficits





- Laboratory Results


Result Diagrams: 


 08/15/18 02:00





 08/15/18 02:00





- ECG


O2 Sat by Pulse Oximetry: 98 (RA)


Pulse Ox Interpretation: Normal





Medical Decision Making


Medical Decision Making: 


Time: 0118


A/P: 63 y/o male with a PMHx of pancreatitis presenting with epigastric pain


-- Concerned for possible pancreatitis vs. gastroenteritis vs. gastritis


-- Patient reports of similar pain in the past





Plan:


-- Urine Drug Screen


-- Morphine 2 mg IVP


-- Sodium Chloride IV 1000 mls/hr


-- Urinalysis





Time: 0200


Plan:


-- CMP


-- Lipase


-- CBC with differentials





Time: 0254


Plan:


-- Zofran Inj 4 mg IVP


-- Toradol 30 mg IVP





330


--Patient tolerating PO, feeling better


--Will d/c home


--Advise to followup with GI as outpatient








________________________________________________________________________________

________


Scribe Attestation:


Documented by Dimas Leyva acting as a scribe for Dr. Ilia Contreras MD.





Provider Scribe Attestation:


All medical record entries made by the Scribe were at my direction and 

personally dictated by me. I have reviewed the chart and agree that the record 

accurately reflects my personal performance of the history, physical exam, 

medical decision making, and the department course for this patient. I have 

also personally directed, reviewed, and agree with the discharge instructions 

and disposition.





Disposition





- Clinical Impression


Clinical Impression: 


 Gastroenteritis








- Disposition


Referrals: 


Rodriguez Shanks MD, PhD [Staff Provider] - 


Disposition: Routine/Home


Disposition Time: 03:30


Condition: IMPROVED


Instructions:  Diarrhea in Adolescents and Adults


Forms:  [facility] (English)

## 2018-09-15 ENCOUNTER — HOSPITAL ENCOUNTER (INPATIENT)
Dept: HOSPITAL 14 - H.ER | Age: 63
LOS: 2 days | Discharge: HOME | DRG: 440 | End: 2018-09-17
Attending: INTERNAL MEDICINE | Admitting: INTERNAL MEDICINE
Payer: COMMERCIAL

## 2018-09-15 VITALS — BODY MASS INDEX: 21.5 KG/M2
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
## 2018-09-15 DIAGNOSIS — F10.21: ICD-10-CM

## 2018-09-15 DIAGNOSIS — F17.210: ICD-10-CM

## 2018-09-15 DIAGNOSIS — K85.80: Primary | ICD-10-CM

## 2018-09-15 DIAGNOSIS — F32.9: ICD-10-CM

## 2018-09-15 DIAGNOSIS — F41.9: ICD-10-CM

## 2018-09-15 DIAGNOSIS — G89.29: ICD-10-CM

## 2018-09-15 DIAGNOSIS — R00.0: ICD-10-CM

## 2018-09-15 DIAGNOSIS — I10: ICD-10-CM

## 2018-09-15 DIAGNOSIS — K86.1: ICD-10-CM

## 2018-09-15 LAB
ALBUMIN SERPL-MCNC: 4.2 G/DL (ref 3.5–5)
ALBUMIN/GLOB SERPL: 1.4 {RATIO} (ref 1–2.1)
ALT SERPL-CCNC: 31 U/L (ref 21–72)
APTT BLD: 26.4 SECONDS (ref 25.6–37.1)
AST SERPL-CCNC: 28 U/L (ref 17–59)
BASOPHILS # BLD AUTO: 0.1 K/UL (ref 0–0.2)
BASOPHILS NFR BLD: 1.2 % (ref 0–2)
BILIRUB UR-MCNC: NEGATIVE MG/DL
BUN SERPL-MCNC: 13 MG/DL (ref 9–20)
CALCIUM SERPL-MCNC: 9.6 MG/DL (ref 8.4–10.2)
COLOR UR: YELLOW
EOSINOPHIL # BLD AUTO: 0.1 K/UL (ref 0–0.7)
EOSINOPHIL NFR BLD: 1.5 % (ref 0–4)
ERYTHROCYTE [DISTWIDTH] IN BLOOD BY AUTOMATED COUNT: 14.3 % (ref 11.5–14.5)
GFR NON-AFRICAN AMERICAN: > 60
GLUCOSE UR STRIP-MCNC: (no result) MG/DL
HGB BLD-MCNC: 13.1 G/DL (ref 12–18)
INR PPP: 1.1
LEUKOCYTE ESTERASE UR-ACNC: (no result) LEU/UL
LIPASE SERPL-CCNC: 1492 U/L (ref 23–300)
LYMPHOCYTES # BLD AUTO: 2 K/UL (ref 1–4.3)
LYMPHOCYTES NFR BLD AUTO: 34.1 % (ref 20–40)
MCH RBC QN AUTO: 32 PG (ref 27–31)
MCHC RBC AUTO-ENTMCNC: 33.4 G/DL (ref 33–37)
MCV RBC AUTO: 95.9 FL (ref 80–94)
MONOCYTES # BLD: 0.5 K/UL (ref 0–0.8)
MONOCYTES NFR BLD: 7.6 % (ref 0–10)
NEUTROPHILS # BLD: 3.3 K/UL (ref 1.8–7)
NEUTROPHILS NFR BLD AUTO: 55.6 % (ref 50–75)
NRBC BLD AUTO-RTO: 0.1 % (ref 0–0)
PH UR STRIP: 6 [PH] (ref 5–8)
PLATELET # BLD: 215 K/UL (ref 130–400)
PMV BLD AUTO: 8.2 FL (ref 7.2–11.7)
PROT UR STRIP-MCNC: NEGATIVE MG/DL
PROTHROMBIN TIME: 11.8 SECONDS (ref 9.8–13.1)
RBC # BLD AUTO: 4.09 MIL/UL (ref 4.4–5.9)
RBC # UR STRIP: NEGATIVE /UL
SP GR UR STRIP: 1.01 (ref 1–1.03)
SQUAMOUS EPITHIAL: < 1 /HPF (ref 0–5)
URINE CLARITY: CLEAR
UROBILINOGEN UR-MCNC: (no result) MG/DL (ref 0.2–1)
WBC # BLD AUTO: 5.9 K/UL (ref 4.8–10.8)

## 2018-09-15 RX ADMIN — DEXTROSE AND SODIUM CHLORIDE SCH MLS/HR: 5; 900 INJECTION, SOLUTION INTRAVENOUS at 23:06

## 2018-09-15 NOTE — ED PDOC
HPI: Abdomen


Time Seen by Provider: 09/15/18 17:10


Chief Complaint (Nursing): Abdominal Pain


Chief Complaint (Provider): Abdominal Pain


History Per: Patient


History/Exam Limitations: no limitations


Onset/Duration Of Symptoms: Days (x2)


Current Symptoms Are (Timing): Still Present


Additional Complaint(s): 


62 year old male, with a past medical history of pancreatitis, presenting for 

evaluation of non-bloody vomiting and non-bloody diarrhea associated with 

abdominal pain x2 days. Patient denies any fever and states this episode of 

symptoms feels like his previous episodes of pancreatitis. Patient denies any 

alcohol use. 





PMD: Dr. Hahn








Past Medical History


Reviewed: Historical Data, Nursing Documentation, Vital Signs


Vital Signs: 


 Last Vital Signs











Temp  98 F   09/16/18 16:28


 


Pulse  69   09/16/18 16:28


 


Resp  18   09/16/18 16:28


 


BP  99/60 L  09/16/18 16:28


 


Pulse Ox  92 L  09/16/18 16:28














- Medical History


PMH: Anxiety, Depression, HTN, Pancreatitis (Recurrent), Chronic Pain


   Denies: HIV, Chronic Kidney Disease





- Surgical History


Surgical History: No Surg Hx





- Family History


Family History: States: Unknown Family Hx





- Social History


Alcohol: None





- Immunization History


Hx Tetanus Toxoid Vaccination: Yes


Hx Influenza Vaccination: Yes


Hx Pneumococcal Vaccination: Yes





- Home Medications


Home Medications: 


 Ambulatory Orders











 Medication  Instructions  Recorded


 


Alprazolam [Xanax] 0.5 mg PO TID 06/11/18


 


Paroxetine HCl [Paxil] 40 mg PO DAILY 06/11/18














- Allergies


Allergies/Adverse Reactions: 


 Allergies











Allergy/AdvReac Type Severity Reaction Status Date / Time


 


No Known Allergies Allergy   Verified 09/15/18 16:55














Review of Systems


ROS Statement: Except As Marked, All Systems Reviewed And Found Negative


Constitutional: Negative for: Fever


Gastrointestinal: Positive for: Vomiting, Abdominal Pain, Diarrhea





Physical Exam





- Reviewed


Nursing Documentation Reviewed: Yes


Vital Signs Reviewed: Yes





- Physical Exam


Appears: Positive for: Non-toxic, No Acute Distress


Head Exam: Positive for: ATRAUMATIC


Skin: Positive for: Normal Color, Warm


Eye Exam: Positive for: Normal appearance


Cardiovascular/Chest: Positive for: Regular Rate, Rhythm.  Negative for: Murmur


Respiratory: Positive for: Normal Breath Sounds.  Negative for: Respiratory 

Distress


Gastrointestinal/Abdominal: Positive for: Tenderness (generalized abdominal 

tenderness, most in epigastrium).  Negative for: Guarding, Rebound


Neurologic/Psych: Positive for: Alert, Oriented





- Laboratory Results


Result Diagrams: 


 09/16/18 04:32





 09/16/18 04:32





- ECG


O2 Sat by Pulse Oximetry: 97 (RA)


Pulse Ox Interpretation: Normal





Medical Decision Making


Medical Decision Making: 


Plan:


-CT abdomen and pelvis with IV contrast


-EKG


-Alcohol serum


-CMP


-Lipase


-CBC


-PTT/PT


-CXR


-Lactated Ringers 1,000mL  IV


-Morphine 2mg IV


-Zofran 4mg IV


-Urinalysis


-Reevaluation 





--------------------------------------------------------------------------------

-----


Scribe Attestation:


Documented by Jonn Boucher, acting as a scribe for Leigh Wright MD. 





Provider Scribe Attestation:


All medical record entries made by the Scribe were at my direction and 

personally dictated by me. I have reviewed the chart and agree that the record 

accurately reflects my personal performance of the history, physical exam, 

medical decision making, and the department course for this patient. I have 

also personally directed, reviewed, and agree with the discharge instructions 

and disposition.











Disposition





- Clinical Impression


Clinical Impression: 


 Pancreatitis








- Patient ED Disposition


Is Patient to be Admitted: Yes





- Disposition


Disposition Time: 18:33


Condition: STABLE





- Pt Status Changed To:


Hospital Disposition Of: Inpatient





- Admit Certification


Admit to Inpatient:: After my assessment, the patient will require 

hospitalization for at least two midnights.  This is because of the severity of 

symptoms shown, intensity of services needed, and/or the medical risk in this 

patient being treated as an outpatient.





- POA


Present On Arrival: None

## 2018-09-16 LAB
ALBUMIN SERPL-MCNC: 3.7 G/DL (ref 3.5–5)
ALBUMIN/GLOB SERPL: 1.3 {RATIO} (ref 1–2.1)
ALT SERPL-CCNC: 26 U/L (ref 21–72)
AMYLASE SERPL-CCNC: 686 U/L (ref 30–110)
AST SERPL-CCNC: 26 U/L (ref 17–59)
BASOPHILS # BLD AUTO: 0 K/UL (ref 0–0.2)
BASOPHILS NFR BLD: 0.6 % (ref 0–2)
BUN SERPL-MCNC: 12 MG/DL (ref 9–20)
CALCIUM SERPL-MCNC: 9.1 MG/DL (ref 8.4–10.2)
EOSINOPHIL # BLD AUTO: 0.1 K/UL (ref 0–0.7)
EOSINOPHIL NFR BLD: 1.3 % (ref 0–4)
ERYTHROCYTE [DISTWIDTH] IN BLOOD BY AUTOMATED COUNT: 14.5 % (ref 11.5–14.5)
GFR NON-AFRICAN AMERICAN: > 60
HDLC SERPL-MCNC: 39 MG/DL (ref 30–70)
HGB BLD-MCNC: 13.1 G/DL (ref 12–18)
LDLC SERPL-MCNC: 93 MG/DL (ref 0–129)
LIPASE SERPL-CCNC: 4598 U/L (ref 23–300)
LYMPHOCYTES # BLD AUTO: 2 K/UL (ref 1–4.3)
LYMPHOCYTES NFR BLD AUTO: 28.3 % (ref 20–40)
MCH RBC QN AUTO: 32 PG (ref 27–31)
MCHC RBC AUTO-ENTMCNC: 33.2 G/DL (ref 33–37)
MCV RBC AUTO: 96.3 FL (ref 80–94)
MONOCYTES # BLD: 0.5 K/UL (ref 0–0.8)
MONOCYTES NFR BLD: 6.5 % (ref 0–10)
NEUTROPHILS # BLD: 4.5 K/UL (ref 1.8–7)
NEUTROPHILS NFR BLD AUTO: 63.3 % (ref 50–75)
NRBC BLD AUTO-RTO: 0 % (ref 0–0)
PLATELET # BLD: 190 K/UL (ref 130–400)
PMV BLD AUTO: 8.4 FL (ref 7.2–11.7)
RBC # BLD AUTO: 4.09 MIL/UL (ref 4.4–5.9)
T4 SERPL-MCNC: 8.06 UG/DL (ref 5.5–11)
WBC # BLD AUTO: 7.2 K/UL (ref 4.8–10.8)

## 2018-09-16 RX ADMIN — DEXTROSE AND SODIUM CHLORIDE SCH: 5; 900 INJECTION, SOLUTION INTRAVENOUS at 08:11

## 2018-09-16 RX ADMIN — DEXTROSE AND SODIUM CHLORIDE SCH MLS/HR: 5; 900 INJECTION, SOLUTION INTRAVENOUS at 10:42

## 2018-09-16 NOTE — CT
Date of service: 



09/15/2018



PROCEDURE:  CT Abdomen and Pelvis with contrast



HISTORY:

Gen abd pain, >epigastrium, v/d



COMPARISON:

07/13/2018



TECHNIQUE:

Contrast dose: 95 milliliters



Radiation dose:



Total exam DLP = 229 mGy-cm.



This CT exam was performed using one or more of the following dose 

reduction techniques: Automated exposure control, adjustment of the 

mA and/or kV according to patient size, and/or use of iterative 

reconstruction technique.



FINDINGS:



LOWER THORAX:

No infiltrate or effusion at the lung bases.  Visualized esophagus 

shows evidence of small hiatal hernia. There is also the suggestion 

of some mild wall thickening which may suggest mild esophagitis. This 

is also seen on the prior study. Visualized stomach is decompressed 

without focal wall thickening. Tiny gastric diverticulum is seen in 

the fundus. Duodenum is unremarkable. 



LIVER:

Liver is mildly fatty infiltrated without evidence of focal mass or 

intrahepatic ductal dilatation. 



GALLBLADDER AND BILE DUCTS:

Unremarkable. 



PANCREAS:

Unremarkable. No gross lesion or ductal dilatation.



SPLEEN:

Unremarkable. 



ADRENALS:

There is symmetric bilateral adrenal thickening which may suggest 

hyperplasia and noted on prior study. 



KIDNEYS AND URETERS:

Tiny right renal cyst is noted. No hydronephrosis, perinephric 

changes, or calculus are seen.  Tiny left renal cyst is also noted.  

Ureters are within normal limits. 



VASCULATURE:

Atherosclerotic change without aneurysm. 



BOWEL:

Mild chronic diverticular changes. No pericolonic inflammatory change 

or bowel wall thickening to suggest colitis. No small bowel 

dilatation or small bowel obstruction noted. Terminal ileum is 

unremarkable. 



APPENDIX:

Retrocecal appendix normal in outline. 



PERITONEUM:

Unremarkable. No free fluid. No free air. 



LYMPH NODES:

Unremarkable. No enlarged lymph nodes. 



BLADDER:

Bladder is distended, without wall thickening. 



REPRODUCTIVE:

Prostate gland is mildly enlarged. 



BONES:

No acute fracture. 



OTHER FINDINGS:

None.



IMPRESSION:

No appreciable acute inflammatory process in the abdomen and pelvis. 

Stable mild distal esophageal thickening which may suggest mild 

esophagitis or be associated with a small hiatal hernia. Tiny gastric 

diverticulum unchanged from prior study. No evidence of gallstones or 

gallbladder wall thickening.  Unremarkable pancreas. Stable mild 

adrenal nodular thickening which may suggest hyperplasia. This agrees 

with preliminary report.

## 2018-09-16 NOTE — CP.PCM.HP
History of Present Illness





- History of Present Illness


History of Present Illness: 


CC:  Abdominal pain.





63 y/o M, Hx of Pancreatitis, ETOH abuse, Opioid dependance,  br[facility] via EMS 

[facility] to [facility] for acute generalized abdominal pain, which 

is constant, stabbing, moderate-severe intensity 6-7:10 that began 2 days PTA 

increased on DOA, with no relief, associated to nausea, vomiting non bilious,

non bloody, and diarrhea non bloody.





Worsening symptoms:  Elevated Lipase while in , today 4598, Amylase 686. 

Irritability due to pain, weakness.





Aggravated factor:  Food.





Pt denied:  Fever, chills, dizziness, CP, palpitations, SOB, cough, sick contact

, recent travel out of [place].








Abd/pelv CT:  No inflammation process, mild distal esophageal thickening which 

may suggest mild Esophagitis.





CXR:  Mid interstitial change in hyperinflation without new focal infiltrate.





EKG:  Sinus tachycardia.





Present on Admission





- Present on Admission


Any Indicators Present on Admission: No





Review of Systems





- Constitutional


Constitutional: Weakness





- EENT


Eyes: Requires Corrective Lenses


Ears: Other (negative)


Nose/Mouth/Throat: Other (negative)





- Cardiovascular


Cardiovascular: Other (negative)





- Respiratory


Respiratory: Other (negative)





- Gastrointestinal


Gastrointestinal: Abdominal Pain, Diarrhea, Nausea, Vomiting





- Genitourinary


Genitourinary: Other (negative)





- Integumentary


Integumentary: Other (negative)





- Neurological


Neurological: Weakness





- Psychiatric


Psychiatric: Anxiety, Depression





- Endocrine


Endocrine: Other (negative)





- Hematologic/Lymphatic


Hematologic: Other (negative)





Past Patient History





- Infectious Disease


Hx of Infectious Diseases: None





- Past Medical History & Family History


Past Medical History?: Yes


Pertinent Family History: 





Unknown





- Past Social History


Smoking Status: Heavy Smoker > 10 Cigarettes Daily


Alcohol: Other (Hx of ETOH abuse)


Drugs: Denies


Home Situation {Lives}: Alone





- CARDIAC


Hx Cardiac Disorders: Yes


Hx Hypertension: Yes





- PULMONARY


Hx Respiratory Disorders: No





- NEUROLOGICAL


Hx Neurological Disorder: No





- HEENT


Hx HEENT Problems: No





- RENAL


Hx Chronic Kidney Disease: No





- ENDOCRINE/METABOLIC


Hx Endocrine Disorders: Yes (pancreatitis)





- HEMATOLOGICAL/ONCOLOGICAL


Hx Blood Disorders: No


Hx Human Immunodeficiency Virus (HIV): No





- INTEGUMENTARY


Hx Dermatological Problems: No





- MUSCULOSKELETAL/RHEUMATOLOGICAL


Hx Musculoskeletal Disorders: Yes


Hx Falls: Yes





- GASTROINTESTINAL


Hx Gastrointestinal Disorders: Yes


Hx Pancreatitis: Yes (Recurrent)





- GENITOURINARY/GYNECOLOGICAL


Hx Genitourinary Disorders: No





- PSYCHIATRIC


Hx Psychophysiologic Disorder: Yes (anxiety; depression, hx of etoh abuse)


Hx Anxiety: Yes


Hx Depression: Yes


Hx Substance Use: No





- SURGICAL HISTORY


Hx Surgeries: No





- ANESTHESIA


Hx Anesthesia: No


Hx Anesthesia Reactions: No


Hx Malignant Hyperthermia: No





Meds


Allergies/Adverse Reactions: 


 Allergies











Allergy/AdvReac Type Severity Reaction Status Date / Time


 


No Known Allergies Allergy   Verified 09/15/18 16:55














Physical Exam





- Constitutional


Appears: No Acute Distress





- Head Exam


Head Exam: NORMAL INSPECTION





- Eye Exam


Eye Exam: PERRL





- ENT Exam


ENT Exam: Normal Oropharynx





- Neck Exam


Neck exam: Positive for: Normal Inspection





- Respiratory Exam


Respiratory Exam: NORMAL BREATHING PATTERN





- Cardiovascular Exam


Cardiovascular Exam: REGULAR RHYTHM





- GI/Abdominal Exam


GI & Abdominal Exam: Normal Bowel Sounds, Tenderness (mild generalized)





- Extremities Exam


Extremities exam: Positive for: normal inspection





- Back Exam


Back exam: NORMAL INSPECTION





- Neurological Exam


Neurological exam: Alert, Oriented x3


Additional comments: 





No motor/sensory deficit.





- Psychiatric Exam


Psychiatric exam: Anxious





- Skin


Skin Exam: Warm





Results





- Vital Signs


Recent Vital Signs: 





 Last Vital Signs











Temp  97.5 F L  09/16/18 07:54


 


Pulse  58 L  09/16/18 07:54


 


Resp  19   09/16/18 07:54


 


BP  107/68   09/16/18 07:54


 


Pulse Ox  94 L  09/16/18 07:54








reviewed j.P.





- Labs


Result Diagrams: 


 09/16/18 04:32





 09/16/18 04:32


Labs: 





 Laboratory Results - last 24 hr











  09/15/18 09/15/18 09/15/18





  17:40 17:40 17:40


 


WBC   5.9 


 


RBC   4.09 L 


 


Hgb   13.1 


 


Hct   39.3 


 


MCV   95.9 H 


 


MCH   32.0 H 


 


MCHC   33.4 


 


RDW   14.3 


 


Plt Count   215 


 


MPV   8.2 


 


Neut % (Auto)   55.6 


 


Lymph % (Auto)   34.1 


 


Mono % (Auto)   7.6 


 


Eos % (Auto)   1.5 


 


Baso % (Auto)   1.2 


 


Neut # (Auto)   3.3 


 


Lymph # (Auto)   2.0 


 


Mono # (Auto)   0.5 


 


Eos # (Auto)   0.1 


 


Baso # (Auto)   0.1 


 


PT    11.8


 


INR    1.1


 


APTT    26.4


 


Sodium  139  


 


Potassium  4.5  


 


Chloride  105  


 


Carbon Dioxide  27  


 


Anion Gap  12  


 


BUN  13  


 


Creatinine  0.8  


 


Est GFR ( Amer)  > 60  


 


Est GFR (Non-Af Amer)  > 60  


 


Random Glucose  74 L  


 


Calcium  9.6  


 


Total Bilirubin  0.2  


 


AST  28  


 


ALT  31  


 


Alkaline Phosphatase  63  


 


Total Protein  7.2  


 


Albumin  4.2  


 


Globulin  3.0  


 


Albumin/Globulin Ratio  1.4  


 


Triglycerides   


 


Cholesterol   


 


LDL Cholesterol Direct   


 


HDL Cholesterol   


 


Amylase   


 


Lipase  1492 H  


 


Thyroxine (T4)   


 


TSH 3rd Generation   


 


Urine Color   


 


Urine Clarity   


 


Urine pH   


 


Ur Specific Gravity   


 


Urine Protein   


 


Urine Glucose (UA)   


 


Urine Ketones   


 


Urine Blood   


 


Urine Nitrate   


 


Urine Bilirubin   


 


Urine Urobilinogen   


 


Ur Leukocyte Esterase   


 


Urine RBC (Auto)   


 


Urine Microscopic WBC   


 


Ur Squamous Epith Cells   


 


Alcohol, Quantitative  < 10  














  09/15/18 09/16/18 09/16/18





  18:30 04:32 04:32


 


WBC   7.2 


 


RBC   4.09 L 


 


Hgb   13.1 


 


Hct   39.4 


 


MCV   96.3 H 


 


MCH   32.0 H 


 


MCHC   33.2 


 


RDW   14.5 


 


Plt Count   190 


 


MPV   8.4 


 


Neut % (Auto)   63.3 


 


Lymph % (Auto)   28.3 


 


Mono % (Auto)   6.5 


 


Eos % (Auto)   1.3 


 


Baso % (Auto)   0.6 


 


Neut # (Auto)   4.5 


 


Lymph # (Auto)   2.0 


 


Mono # (Auto)   0.5 


 


Eos # (Auto)   0.1 


 


Baso # (Auto)   0.0 


 


PT   


 


INR   


 


APTT   


 


Sodium    142


 


Potassium    4.4


 


Chloride    110 H


 


Carbon Dioxide    28


 


Anion Gap    8 L


 


BUN    12


 


Creatinine    0.8


 


Est GFR ( Amer)    > 60


 


Est GFR (Non-Af Amer)    > 60


 


Random Glucose    83


 


Calcium    9.1


 


Total Bilirubin    0.4


 


AST    26


 


ALT    26


 


Alkaline Phosphatase    64


 


Total Protein    6.6


 


Albumin    3.7


 


Globulin    2.9


 


Albumin/Globulin Ratio    1.3


 


Triglycerides    66


 


Cholesterol    155


 


LDL Cholesterol Direct    93


 


HDL Cholesterol    39


 


Amylase    686 H D


 


Lipase    4598 H


 


Thyroxine (T4)    8.06


 


TSH 3rd Generation    2.40


 


Urine Color  Yellow  


 


Urine Clarity  Clear  


 


Urine pH  6.0  


 


Ur Specific Gravity  1.010  


 


Urine Protein  Negative  


 


Urine Glucose (UA)  Neg  


 


Urine Ketones  Negative  


 


Urine Blood  Negative  


 


Urine Nitrate  Negative  


 


Urine Bilirubin  Negative  


 


Urine Urobilinogen  0.2-1.0  


 


Ur Leukocyte Esterase  Neg  


 


Urine RBC (Auto)  1  


 


Urine Microscopic WBC  1  


 


Ur Squamous Epith Cells  < 1  


 


Alcohol, Quantitative   








reviewed J.P.





- EKG Data


EKG comments: 





Reviewed J.P.





- Imaging and Cardiology


  ** Chest x-ray


Status: Report reviewed by me (ASIF)





  ** CT scan - abdomen


Status: Report reviewed by me (ASIF)





  ** CT scan - pelvis


Status: Report reviewed by me (ASIF)





Assessment & Plan


(1) Acute pancreatitis


Status: Acute   Priority: High   





(2) Abdominal pain


Status: Acute   Priority: High   





(3) Anxiety


Status: Chronic   Priority: Medium   





- Assessment and Plan (Free Text)


Plan: 


Keep NPO,  Continue Dilaudid, protonix, Ativan and rest of Tx.  GI consult. PT 

kvng.





- Date & Time


Date: 09/16/18


Time: 11:30

## 2018-09-16 NOTE — CARD
--------------- APPROVED REPORT --------------





Date of service: 09/15/2018



<Conclusion>

Sinus bradycardia

Otherwise normal ECG

## 2018-09-16 NOTE — RAD
Date of service: 



09/15/2018



HISTORY:

Abd pain  



COMPARISON:

[id number]



TECHNIQUE:

Chest PA and lateral



FINDINGS:



LUNGS:

Lungs are hyperinflated with mild chronic interstitial change in 

apical pleural reticular nodular scarring. No appreciable new 

alveolar infiltrate is noted.



PLEURA:

No significant pleural effusion identified. No pneumothorax apparent.



CARDIOVASCULAR:

Normal.



OSSEOUS STRUCTURES:

No significant abnormalities.



VISUALIZED UPPER ABDOMEN:

Normal.



OTHER FINDINGS:

None.



IMPRESSION:

Mild interstitial change in hyperinflation without new focal 

infiltrate.  Mild chronic apical pleural changes.

## 2018-09-17 VITALS
OXYGEN SATURATION: 96 % | HEART RATE: 79 BPM | TEMPERATURE: 98.5 F | DIASTOLIC BLOOD PRESSURE: 64 MMHG | SYSTOLIC BLOOD PRESSURE: 111 MMHG

## 2018-09-17 VITALS — RESPIRATION RATE: 19 BRPM

## 2018-09-17 LAB
AMYLASE SERPL-CCNC: 241 U/L (ref 30–110)
LIPASE SERPL-CCNC: 85 U/L (ref 23–300)

## 2018-09-17 NOTE — CP.PCM.DIS
Provider





- Provider


Date of Admission: 


09/15/18 18:33





Attending physician: 


Kennedy Hahn MD








Diagnosis





- Discharge Diagnosis


(1) Acute pancreatitis


Status: Acute   Priority: High   





(2) Abdominal pain


Status: Acute   Priority: High   





(3) Anxiety


Status: Chronic   Priority: Medium   





Hospital Course





- Lab Results


Lab Results: 


 Most Recent Lab Values











WBC  7.2 K/uL (4.8-10.8)   09/16/18  04:32    


 


RBC  4.09 Mil/uL (4.40-5.90)  L  09/16/18  04:32    


 


Hgb  13.1 g/dL (12.0-18.0)   09/16/18  04:32    


 


Hct  39.4 % (35.0-51.0)   09/16/18  04:32    


 


MCV  96.3 fl (80.0-94.0)  H  09/16/18  04:32    


 


MCH  32.0 pg (27.0-31.0)  H  09/16/18  04:32    


 


MCHC  33.2 g/dL (33.0-37.0)   09/16/18  04:32    


 


RDW  14.5 % (11.5-14.5)   09/16/18  04:32    


 


Plt Count  190 K/uL (130-400)   09/16/18  04:32    


 


MPV  8.4 fl (7.2-11.7)   09/16/18  04:32    


 


Neut % (Auto)  63.3 % (50.0-75.0)   09/16/18  04:32    


 


Lymph % (Auto)  28.3 % (20.0-40.0)   09/16/18  04:32    


 


Mono % (Auto)  6.5 % (0.0-10.0)   09/16/18  04:32    


 


Eos % (Auto)  1.3 % (0.0-4.0)   09/16/18  04:32    


 


Baso % (Auto)  0.6 % (0.0-2.0)   09/16/18  04:32    


 


Neut # (Auto)  4.5 K/uL (1.8-7.0)   09/16/18  04:32    


 


Lymph # (Auto)  2.0 K/uL (1.0-4.3)   09/16/18  04:32    


 


Mono # (Auto)  0.5 K/uL (0.0-0.8)   09/16/18  04:32    


 


Eos # (Auto)  0.1 K/uL (0.0-0.7)   09/16/18  04:32    


 


Baso # (Auto)  0.0 K/uL (0.0-0.2)   09/16/18  04:32    


 


PT  11.8 Seconds (9.8-13.1)   09/15/18  17:40    


 


INR  1.1   09/15/18  17:40    


 


APTT  26.4 Seconds (25.6-37.1)   09/15/18  17:40    


 


Sodium  142 mmol/l (132-148)   09/16/18  04:32    


 


Potassium  4.4 MMOL/L (3.6-5.0)   09/16/18  04:32    


 


Chloride  110 mmol/L ()  H  09/16/18  04:32    


 


Carbon Dioxide  28 mmol/L (22-30)   09/16/18  04:32    


 


Anion Gap  8  (10-20)  L  09/16/18  04:32    


 


BUN  12 mg/dl (9-20)   09/16/18  04:32    


 


Creatinine  0.8 mg/dl (0.8-1.5)   09/16/18  04:32    


 


Est GFR ( Amer)  > 60   09/16/18  04:32    


 


Est GFR (Non-Af Amer)  > 60   09/16/18  04:32    


 


Random Glucose  83 mg/dL ()   09/16/18  04:32    


 


Calcium  9.1 mg/dL (8.4-10.2)   09/16/18  04:32    


 


Total Bilirubin  0.4 mg/dl (0.2-1.3)   09/16/18  04:32    


 


AST  26 U/L (17-59)   09/16/18  04:32    


 


ALT  26 U/L (21-72)   09/16/18  04:32    


 


Alkaline Phosphatase  64 U/L ()   09/16/18  04:32    


 


Total Protein  6.6 G/DL (6.3-8.2)   09/16/18  04:32    


 


Albumin  3.7 g/dL (3.5-5.0)   09/16/18  04:32    


 


Globulin  2.9 gm/dL (2.2-3.9)   09/16/18  04:32    


 


Albumin/Globulin Ratio  1.3  (1.0-2.1)   09/16/18  04:32    


 


Triglycerides  66 mg/DL (0-149)   09/16/18  04:32    


 


Cholesterol  155 mg/dL (0-199)   09/16/18  04:32    


 


LDL Cholesterol Direct  93 mg/dL (0-129)   09/16/18  04:32    


 


HDL Cholesterol  39 MG/DL (30-70)   09/16/18  04:32    


 


Amylase  241 U/L ()  H D 09/17/18  05:40    


 


Lipase  85 U/L ()   09/17/18  05:40    


 


Thyroxine (T4)  8.06 ug/dl (5.5-11.0)   09/16/18  04:32    


 


TSH 3rd Generation  2.40 mIU/ML (0.46-4.68)   09/16/18  04:32    


 


Urine Color  Yellow  (YELLOW)   09/15/18  18:30    


 


Urine Clarity  Clear  (Clear)   09/15/18  18:30    


 


Urine pH  6.0  (5.0-8.0)   09/15/18  18:30    


 


Ur Specific Gravity  1.010  (1.003-1.030)   09/15/18  18:30    


 


Urine Protein  Negative mg/dL (NEGATIVE)   09/15/18  18:30    


 


Urine Glucose (UA)  Neg mg/dL (Normal)   09/15/18  18:30    


 


Urine Ketones  Negative mg/dL (NEGATIVE)   09/15/18  18:30    


 


Urine Blood  Negative  (NEGATIVE)   09/15/18  18:30    


 


Urine Nitrate  Negative  (NEGATIVE)   09/15/18  18:30    


 


Urine Bilirubin  Negative  (NEGATIVE)   09/15/18  18:30    


 


Urine Urobilinogen  0.2-1.0 mg/dL (0.2-1.0)   09/15/18  18:30    


 


Ur Leukocyte Esterase  Neg Niru/uL (Negative)   09/15/18  18:30    


 


Urine RBC (Auto)  1 /hpf (0-3)   09/15/18  18:30    


 


Urine Microscopic WBC  1 /hpf (0-5)   09/15/18  18:30    


 


Ur Squamous Epith Cells  < 1 /hpf (0-5)   09/15/18  18:30    


 


Alcohol, Quantitative  < 10 mg/dl (0-10)   09/15/18  17:40    














Discharge Exam





- Head Exam


Head Exam: NORMAL INSPECTION





Discharge Plan





- Discharge Medications


Prescriptions: 


Acetaminophen/Codeine NO 2 [Tylenol/Cod 300 MG-15 MG] 1 tab PO Q4 PRN #20 tab


 PRN Reason: Pain, Moderate (4-7)





- Follow Up Plan


Condition: STABLE


Disposition: HOME/ ROUTINE


Instructions:  Pancreatitis (DC)


Additional Instructions: 


follow up with your primary MD 1 week 


Referrals: 


Rodriguez Shanks MD, PhD [Staff Provider] - 


Kennedy Hahn MD [Staff Provider] -

## 2018-09-17 NOTE — CON
DATE:  09/16/2018



REFERRING PHYSICIAN:  Kennedy Hahn MD



HISTORY OF PRESENT ILLNESS:  This is a 62-year-old recovering alcoholic

_____ having drinking for two years and for the last two days, he was

having nausea and vomiting, significant abdominal pain and discomfort

prompted him to come to the emergency room.  Given his history of

pancreatitis, a workup was done including a CT of the abdomen and pelvis

with contrast which did not show any abnormalities to the pancreas. 

However, his lipase initially yesterday was elevated at 1492 and today it

is 4598.  LFTs were normal.  CBC is essentially unremarkable.  The patient

this morning looks and feels clinically stable; however, he is complaining

of severe abdominal pain and discomfort and he had asked for stronger pain

medications.  He says he is not having blood per rectum, melena, or any

loose stools.  He is alert and oriented x3.



MEDICATIONS:  He does take a Percocet on a daily basis.  He is still on it

at home.  His other medications at home are _____ Xanax.



ALLERGIES:  NO KNOWN DRUG ALLERGIES.



PAST MEDICAL HISTORY:  Depression, anxiety, history of pancreatitis,

history of alcoholism and is recovering for two years.



PAST SURGICAL HISTORY:  Negative.



FAMILY HISTORY:  Noncontributory.



SOCIAL HISTORY:  Does smoke a pack a day.  He denies any drug use at the

present, but in the past he had snored some cocaine.  He says he has not

been drinking for two years.  Alcohol level was less than 10.



PHYSICAL EXAMINATION:

GENERAL:  A well-developed, well-nourished  thin male.  Awake,

alert, and oriented.  No acute distress.

VITAL SIGNS:  Stable.  He is afebrile.

ABDOMEN:  Soft, positive bowel sounds.  Nondistended.  Some tenderness to

palpation at the epigastric area.  No palpable masses or lesions.



LABORATORY DATA:  Laboratories are reviewed as per HPI.  CAT scan as per

the HPI.



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

with increasing lipase from 1492 yesterday to 4598 today; however, no CT

findings of pancreatitis.  No white blood cell count or fever.  At this

time, I assumed that he has acute on chronic, although there are no changes

of chronic pancreatitis noted on the CAT scan and he has sever pain and

discomfort.  We will change his intravenous fluids to lactated Ringer's at

200 mL per hour since he has a history of cardiac disease.  We will also

increase his Dilaudid to 4 mg every 4 hours on an as needed basis to

control his pain and discomfort.  Repeat labs tomorrow particularly the

lipase.  Keep him nothing by mouth and will follow along with you.



Thank you very much for this referral.





__________________________________________

Buddy Underwood MD





DD:  09/16/2018 12:30:42

DT:  09/17/2018 0:04:41

Job # [id number]

## 2018-09-19 NOTE — PQF
PROVIDER RESPONSE TEXT:



Opioid Dependence is currently not a diagnosis for this admission. Previously in the past.



REVIEWER QUERY TEXT:



Clinical Validity



Additional clinical indicators are required to support your documented diagnosis of  OPIOID DEPENDENC
E.



IS OPIOID DEPENDENCE A CURRENT DIAGNOSIS FOR THIS ADMISSION.



Please respond and also state in your next progress note whether:

-- Condition exists and also please provide clinical indicators to support the diagnosis

-- Condition does not exist and also please provide amended documentation in the medical record to cl
tan

-- Unable to provide additional clarity regarding the diagnosis

-- Other, please specify



The patient's Clinical Indicators include:





Medication at home includes: Xanax and Paxil.





Query created by: Mandy Ellsworth on 9/17/2018 11:00 AM





Electronically signed by:  Kennedy Hahn MD 9/19/2018 11:32 AM

## 2018-10-10 ENCOUNTER — HOSPITAL ENCOUNTER (EMERGENCY)
Dept: HOSPITAL 14 - H.ER | Age: 63
Discharge: HOME | End: 2018-10-10
Payer: MEDICARE

## 2018-10-10 VITALS
TEMPERATURE: 98.2 F | DIASTOLIC BLOOD PRESSURE: 77 MMHG | OXYGEN SATURATION: 99 % | HEART RATE: 76 BPM | RESPIRATION RATE: 16 BRPM | SYSTOLIC BLOOD PRESSURE: 131 MMHG

## 2018-10-10 VITALS — BODY MASS INDEX: 21.5 KG/M2

## 2018-10-10 DIAGNOSIS — R10.13: Primary | ICD-10-CM

## 2018-10-10 LAB
ALBUMIN SERPL-MCNC: 4.4 G/DL (ref 3.5–5)
ALBUMIN/GLOB SERPL: 1.3 {RATIO} (ref 1–2.1)
ALT SERPL-CCNC: 32 U/L (ref 21–72)
AST SERPL-CCNC: 29 U/L (ref 17–59)
BASOPHILS # BLD AUTO: 0.1 K/UL (ref 0–0.2)
BASOPHILS NFR BLD: 0.7 % (ref 0–2)
BUN SERPL-MCNC: 16 MG/DL (ref 9–20)
CALCIUM SERPL-MCNC: 10 MG/DL (ref 8.4–10.2)
EOSINOPHIL # BLD AUTO: 0.1 K/UL (ref 0–0.7)
EOSINOPHIL NFR BLD: 1 % (ref 0–4)
ERYTHROCYTE [DISTWIDTH] IN BLOOD BY AUTOMATED COUNT: 14.8 % (ref 11.5–14.5)
GFR NON-AFRICAN AMERICAN: > 60
HGB BLD-MCNC: 13.9 G/DL (ref 12–18)
LIPASE SERPL-CCNC: 127 U/L (ref 23–300)
LYMPHOCYTES # BLD AUTO: 1.9 K/UL (ref 1–4.3)
LYMPHOCYTES NFR BLD AUTO: 22.2 % (ref 20–40)
MCH RBC QN AUTO: 32.6 PG (ref 27–31)
MCHC RBC AUTO-ENTMCNC: 34.3 G/DL (ref 33–37)
MCV RBC AUTO: 95.1 FL (ref 80–94)
MONOCYTES # BLD: 0.5 K/UL (ref 0–0.8)
MONOCYTES NFR BLD: 5.6 % (ref 0–10)
NEUTROPHILS # BLD: 6.2 K/UL (ref 1.8–7)
NEUTROPHILS NFR BLD AUTO: 70.5 % (ref 50–75)
NRBC BLD AUTO-RTO: 0.1 % (ref 0–0)
PLATELET # BLD: 177 K/UL (ref 130–400)
PMV BLD AUTO: 9.4 FL (ref 7.2–11.7)
RBC # BLD AUTO: 4.25 MIL/UL (ref 4.4–5.9)
WBC # BLD AUTO: 8.8 K/UL (ref 4.8–10.8)

## 2018-10-10 PROCEDURE — 96374 THER/PROPH/DIAG INJ IV PUSH: CPT

## 2018-10-10 PROCEDURE — 99282 EMERGENCY DEPT VISIT SF MDM: CPT

## 2018-10-10 PROCEDURE — 80053 COMPREHEN METABOLIC PANEL: CPT

## 2018-10-10 PROCEDURE — 85025 COMPLETE CBC W/AUTO DIFF WBC: CPT

## 2018-10-10 PROCEDURE — 83690 ASSAY OF LIPASE: CPT

## 2018-10-10 NOTE — ED PDOC
HPI: Abdomen


Time Seen by Provider: 10/10/18 16:00


Chief Complaint (Nursing): Abdominal Pain


Chief Complaint (Provider): Abdominal Pain


History Per: Patient


History/Exam Limitations: no limitations


Onset/Duration Of Symptoms: Days


Current Symptoms Are (Timing): Still Present


Additional Complaint(s): 


63 y/o male with a PMHx of Depression and Anxiety presents to the ED for 

evaluation of epigastric pain associated with nausea and vomiting. Denies fever.

pt is concerned that it it his pancreatitis, has history of pancreatitis.





PMD: No Provider





Past Medical History


Reviewed: Historical Data, Nursing Documentation, Vital Signs


Vital Signs: 





                                Last Vital Signs











Temp  98.2 F   10/10/18 16:12


 


Pulse  76   10/10/18 16:12


 


Resp  16   10/10/18 16:12


 


BP  131/77   10/10/18 16:12


 


Pulse Ox  99   10/10/18 16:12














- Medical History


PMH: Anxiety, Depression, HTN, Pancreatitis (Recurrent), Chronic Pain


   Denies: HIV, Chronic Kidney Disease





- Surgical History


Surgical History: No Surg Hx





- Family History


Family History: States: Unknown Family Hx





- Social History


Current smoker - smoking cessation education provided: No


Ex-Smoker (has not smoked in the last 12 months): Yes (x3 years)


Drugs: Denies (Ex-Drug User x3 years)





- Immunization History


Hx Tetanus Toxoid Vaccination: Yes


Hx Influenza Vaccination: Yes


Hx Pneumococcal Vaccination: Yes





- Home Medications


Home Medications: 


                                Ambulatory Orders











 Medication  Instructions  Recorded


 


RX: Alprazolam [Xanax] 0.5 mg PO TID 06/11/18


 


RX: Paroxetine HCl [Paxil] 40 mg PO DAILY 06/11/18


 


Acetaminophen/Codeine NO 2 1 tab PO Q4 PRN #20 tab 09/17/18





[Tylenol/Cod 300 MG-15 MG]  


 


Famotidine [Pepcid] 20 mg PO DAILY PRN #10 tab 10/10/18














- Allergies


Allergies/Adverse Reactions: 


                                    Allergies











Allergy/AdvReac Type Severity Reaction Status Date / Time


 


No Known Allergies Allergy   Verified 10/10/18 16:12














Review of Systems


ROS Statement: Except As Marked, All Systems Reviewed And Found Negative


Constitutional: Negative for: Fever


Gastrointestinal: Positive for: Nausea, Vomiting, Abdominal Pain (Epigastric )





Physical Exam





- Reviewed


Nursing Documentation Reviewed: Yes


Vital Signs Reviewed: Yes





- Physical Exam


Appears: Positive for: No Acute Distress


Head Exam: Positive for: ATRAUMATIC, NORMOCEPHALIC


Skin: Positive for: Warm, Dry


Eye Exam: Positive for: Normal appearance, EOMI, PERRL


Neck: Positive for: Normal, Painless ROM


Cardiovascular/Chest: Positive for: Regular Rate, Rhythm.  Negative for: Murmur


Respiratory: Positive for: Normal Breath Sounds.  Negative for: Respiratory 

Distress


Gastrointestinal/Abdominal: Positive for: Soft, Tenderness (mild epigastric 

tenderness)


Back: Positive for: Normal Inspection.  Negative for: L CVA Tenderness, R CVA 

Tenderness, Vertebral Tenderness


Extremity: Positive for: Normal ROM.  Negative for: Pedal Edema, Deformity


Neurologic/Psych: Positive for: Alert, Oriented.  Negative for: Motor/Sensory 

Deficits





- Laboratory Results


Result Diagrams: 


                                 10/10/18 17:28





                                 10/10/18 17:28





- ECG


O2 Sat by Pulse Oximetry: 99 (RA)


Pulse Ox Interpretation: Normal





Medical Decision Making


Medical Decision Making: 


Time: 1755


Plan: acute on chronic abdominal pain, rule out pancreatitis (check lipase), 

rule out electrolyte abnormality


-- CMP


-- Lipase 


-- CBC with differentials


-- Sodium Chloride  mls/hr


-- Pepcid 20 mg IVP


-- Zofran Inj 4 mg IV





Time: 1822


-- Patient reports of feeling better and tolerated po and requesting to be 

discharged. Patient is stable for discharge home with a referral to the 

[facility]. he states he doesnt have primary dr.


__________________________________________________________________________


Scribe Attestation:


Documented by Dimas Leyva, acting as a scribe forEddie Bower MD.





Provider Scribe Attestation:


All medical record entries made by the Scribe were at my direction and 

personally dictated by me. I have reviewed the chart and agree that the record 

accurately reflects my personal performance of the history, physical exam, 

medical decision making, and the department course for this patient. I have also

 personally directed, reviewed, and agree with the discharge instructions and 

disposition.





Disposition





- Clinical Impression


Clinical Impression: 


 Chronic abdominal pain








- Patient ED Disposition


Is Patient to be Admitted: No


Counseled Patient/Family Regarding: Studies Performed, Diagnosis, Need For 

Followup





- Disposition


Referrals: 


[facility] [Outside]


formerly [facility] [Outside]


Disposition: Routine/Home


Disposition Time: 18:00


Condition: IMPROVED


Additional Instructions: 


follow up with your primary doctor in 1-2 days


return to the ED with any worsening or concerning symptoms


Prescriptions: 


Famotidine [Pepcid] 20 mg PO DAILY PRN #10 tab


 PRN Reason: Heartburn


Instructions:  Chronic Pain (DC)


Forms:  [facility] (English)

## 2024-10-02 NOTE — CARD
--------------- APPROVED REPORT --------------





EKG Measurement

Heart [id number]

[id number]

[id number]

[id number]

[id number]



<Conclusion>

Normal sinus rhythm

Minimal voltage criteria for LVH, may be normal variant

Borderline ECG Resulted